# Patient Record
Sex: MALE | Race: WHITE | NOT HISPANIC OR LATINO | URBAN - METROPOLITAN AREA
[De-identification: names, ages, dates, MRNs, and addresses within clinical notes are randomized per-mention and may not be internally consistent; named-entity substitution may affect disease eponyms.]

---

## 2017-01-20 ENCOUNTER — OFFICE VISIT - HEALTHEAST (OUTPATIENT)
Dept: FAMILY MEDICINE | Facility: CLINIC | Age: 47
End: 2017-01-20

## 2017-01-20 DIAGNOSIS — H61.23 BILATERAL IMPACTED CERUMEN: ICD-10-CM

## 2017-01-20 DIAGNOSIS — F41.9 ANXIETY: ICD-10-CM

## 2017-01-20 ASSESSMENT — MIFFLIN-ST. JEOR: SCORE: 2061.91

## 2017-01-26 ENCOUNTER — COMMUNICATION - HEALTHEAST (OUTPATIENT)
Dept: FAMILY MEDICINE | Facility: CLINIC | Age: 47
End: 2017-01-26

## 2017-01-26 DIAGNOSIS — I10 UNSPECIFIED ESSENTIAL HYPERTENSION: ICD-10-CM

## 2017-02-16 ENCOUNTER — COMMUNICATION - HEALTHEAST (OUTPATIENT)
Dept: FAMILY MEDICINE | Facility: CLINIC | Age: 47
End: 2017-02-16

## 2017-02-16 DIAGNOSIS — F41.9 ANXIETY: ICD-10-CM

## 2017-02-20 ENCOUNTER — OFFICE VISIT - HEALTHEAST (OUTPATIENT)
Dept: FAMILY MEDICINE | Facility: CLINIC | Age: 47
End: 2017-02-20

## 2017-02-20 DIAGNOSIS — F41.9 ANXIETY: ICD-10-CM

## 2017-03-01 ENCOUNTER — OFFICE VISIT - HEALTHEAST (OUTPATIENT)
Dept: FAMILY MEDICINE | Facility: CLINIC | Age: 47
End: 2017-03-01

## 2017-03-01 DIAGNOSIS — L02.92 FURUNCLE: ICD-10-CM

## 2017-03-07 ENCOUNTER — COMMUNICATION - HEALTHEAST (OUTPATIENT)
Dept: PHYSICAL MEDICINE AND REHAB | Facility: CLINIC | Age: 47
End: 2017-03-07

## 2017-03-07 DIAGNOSIS — M54.16 LUMBAR RADICULOPATHY: ICD-10-CM

## 2017-03-21 ENCOUNTER — COMMUNICATION - HEALTHEAST (OUTPATIENT)
Dept: FAMILY MEDICINE | Facility: CLINIC | Age: 47
End: 2017-03-21

## 2017-03-21 DIAGNOSIS — F41.9 ANXIETY: ICD-10-CM

## 2017-05-26 ENCOUNTER — COMMUNICATION - HEALTHEAST (OUTPATIENT)
Dept: SCHEDULING | Facility: CLINIC | Age: 47
End: 2017-05-26

## 2017-05-26 ENCOUNTER — OFFICE VISIT - HEALTHEAST (OUTPATIENT)
Dept: FAMILY MEDICINE | Facility: CLINIC | Age: 47
End: 2017-05-26

## 2017-05-26 DIAGNOSIS — J32.9 SINUSITIS: ICD-10-CM

## 2017-05-26 DIAGNOSIS — K13.70 MOUTH LESION: ICD-10-CM

## 2017-06-12 ENCOUNTER — COMMUNICATION - HEALTHEAST (OUTPATIENT)
Dept: PHYSICAL MEDICINE AND REHAB | Facility: CLINIC | Age: 47
End: 2017-06-12

## 2017-06-12 DIAGNOSIS — M54.16 LUMBAR RADICULOPATHY: ICD-10-CM

## 2017-06-14 ENCOUNTER — HOSPITAL ENCOUNTER (OUTPATIENT)
Dept: PHYSICAL MEDICINE AND REHAB | Facility: CLINIC | Age: 47
Discharge: HOME | End: 2017-06-14
Attending: PHYSICIAN ASSISTANT

## 2017-06-14 DIAGNOSIS — M54.16 LUMBAR RADICULOPATHY: ICD-10-CM

## 2017-06-14 DIAGNOSIS — M54.12 CERVICAL RADICULAR PAIN: ICD-10-CM

## 2017-06-14 DIAGNOSIS — M47.812 SPONDYLOSIS OF CERVICAL REGION WITHOUT MYELOPATHY OR RADICULOPATHY: ICD-10-CM

## 2017-06-14 DIAGNOSIS — M54.50 LUMBALGIA: ICD-10-CM

## 2017-06-14 ASSESSMENT — MIFFLIN-ST. JEOR: SCORE: 2105.91

## 2017-06-15 ENCOUNTER — COMMUNICATION - HEALTHEAST (OUTPATIENT)
Dept: FAMILY MEDICINE | Facility: CLINIC | Age: 47
End: 2017-06-15

## 2017-06-16 ENCOUNTER — COMMUNICATION - HEALTHEAST (OUTPATIENT)
Dept: FAMILY MEDICINE | Facility: CLINIC | Age: 47
End: 2017-06-16

## 2017-06-16 DIAGNOSIS — I10 UNSPECIFIED ESSENTIAL HYPERTENSION: ICD-10-CM

## 2017-06-21 ENCOUNTER — COMMUNICATION - HEALTHEAST (OUTPATIENT)
Dept: FAMILY MEDICINE | Facility: CLINIC | Age: 47
End: 2017-06-21

## 2017-06-21 DIAGNOSIS — K21.9 ESOPHAGEAL REFLUX: ICD-10-CM

## 2017-07-10 ENCOUNTER — COMMUNICATION - HEALTHEAST (OUTPATIENT)
Dept: PHYSICAL MEDICINE AND REHAB | Facility: CLINIC | Age: 47
End: 2017-07-10

## 2017-07-10 DIAGNOSIS — M54.16 LUMBAR RADICULOPATHY: ICD-10-CM

## 2017-08-23 ENCOUNTER — COMMUNICATION - HEALTHEAST (OUTPATIENT)
Dept: FAMILY MEDICINE | Facility: CLINIC | Age: 47
End: 2017-08-23

## 2017-08-23 DIAGNOSIS — M10.9 GOUT: ICD-10-CM

## 2017-09-18 ENCOUNTER — COMMUNICATION - HEALTHEAST (OUTPATIENT)
Dept: FAMILY MEDICINE | Facility: CLINIC | Age: 47
End: 2017-09-18

## 2017-09-18 DIAGNOSIS — F41.9 ANXIETY: ICD-10-CM

## 2017-09-24 ENCOUNTER — COMMUNICATION - HEALTHEAST (OUTPATIENT)
Dept: FAMILY MEDICINE | Facility: CLINIC | Age: 47
End: 2017-09-24

## 2017-09-24 DIAGNOSIS — K21.9 ESOPHAGEAL REFLUX: ICD-10-CM

## 2017-09-25 ENCOUNTER — COMMUNICATION - HEALTHEAST (OUTPATIENT)
Dept: SCHEDULING | Facility: CLINIC | Age: 47
End: 2017-09-25

## 2017-10-27 ENCOUNTER — COMMUNICATION - HEALTHEAST (OUTPATIENT)
Dept: FAMILY MEDICINE | Facility: CLINIC | Age: 47
End: 2017-10-27

## 2017-10-27 DIAGNOSIS — I10 ESSENTIAL HYPERTENSION: ICD-10-CM

## 2017-12-27 ENCOUNTER — COMMUNICATION - HEALTHEAST (OUTPATIENT)
Dept: FAMILY MEDICINE | Facility: CLINIC | Age: 47
End: 2017-12-27

## 2017-12-27 DIAGNOSIS — M10.9 GOUT: ICD-10-CM

## 2017-12-27 DIAGNOSIS — K21.9 ESOPHAGEAL REFLUX: ICD-10-CM

## 2018-02-23 ENCOUNTER — OFFICE VISIT - HEALTHEAST (OUTPATIENT)
Dept: FAMILY MEDICINE | Facility: CLINIC | Age: 48
End: 2018-02-23

## 2018-02-23 ENCOUNTER — COMMUNICATION - HEALTHEAST (OUTPATIENT)
Dept: FAMILY MEDICINE | Facility: CLINIC | Age: 48
End: 2018-02-23

## 2018-02-23 DIAGNOSIS — M10.9 ACUTE GOUTY ARTHROPATHY: ICD-10-CM

## 2018-02-23 DIAGNOSIS — Z79.899 MEDICATION MANAGEMENT: ICD-10-CM

## 2018-02-23 DIAGNOSIS — Z00.00 HEALTH CARE MAINTENANCE: ICD-10-CM

## 2018-02-23 DIAGNOSIS — J01.00 ACUTE NON-RECURRENT MAXILLARY SINUSITIS: ICD-10-CM

## 2018-02-23 DIAGNOSIS — I10 ESSENTIAL HYPERTENSION: ICD-10-CM

## 2018-02-23 DIAGNOSIS — F41.1 ANXIETY STATE: ICD-10-CM

## 2018-02-23 LAB
ANION GAP SERPL CALCULATED.3IONS-SCNC: 8 MMOL/L (ref 5–18)
BUN SERPL-MCNC: 14 MG/DL (ref 8–22)
CALCIUM SERPL-MCNC: 9.5 MG/DL (ref 8.5–10.5)
CHLORIDE BLD-SCNC: 105 MMOL/L (ref 98–107)
CO2 SERPL-SCNC: 27 MMOL/L (ref 22–31)
CREAT SERPL-MCNC: 0.86 MG/DL (ref 0.7–1.3)
GFR SERPL CREATININE-BSD FRML MDRD: >60 ML/MIN/1.73M2
GLUCOSE BLD-MCNC: 72 MG/DL (ref 70–125)
POTASSIUM BLD-SCNC: 4.5 MMOL/L (ref 3.5–5)
SODIUM SERPL-SCNC: 140 MMOL/L (ref 136–145)
URATE SERPL-MCNC: 5.1 MG/DL (ref 3–8)

## 2018-02-23 ASSESSMENT — MIFFLIN-ST. JEOR: SCORE: 2057.37

## 2018-02-25 ENCOUNTER — COMMUNICATION - HEALTHEAST (OUTPATIENT)
Dept: FAMILY MEDICINE | Facility: CLINIC | Age: 48
End: 2018-02-25

## 2018-03-28 ENCOUNTER — COMMUNICATION - HEALTHEAST (OUTPATIENT)
Dept: FAMILY MEDICINE | Facility: CLINIC | Age: 48
End: 2018-03-28

## 2018-03-28 DIAGNOSIS — K21.9 ESOPHAGEAL REFLUX: ICD-10-CM

## 2018-03-28 DIAGNOSIS — I10 ESSENTIAL HYPERTENSION: ICD-10-CM

## 2018-04-05 ENCOUNTER — OFFICE VISIT - HEALTHEAST (OUTPATIENT)
Dept: FAMILY MEDICINE | Facility: CLINIC | Age: 48
End: 2018-04-05

## 2018-04-05 DIAGNOSIS — E78.2 MIXED HYPERLIPIDEMIA: ICD-10-CM

## 2018-04-05 DIAGNOSIS — M79.604 PAIN IN BOTH LOWER EXTREMITIES: ICD-10-CM

## 2018-04-05 DIAGNOSIS — I10 ESSENTIAL HYPERTENSION: ICD-10-CM

## 2018-04-05 DIAGNOSIS — M79.605 PAIN IN BOTH LOWER EXTREMITIES: ICD-10-CM

## 2018-04-05 LAB
ANION GAP SERPL CALCULATED.3IONS-SCNC: 11 MMOL/L (ref 5–18)
BUN SERPL-MCNC: 12 MG/DL (ref 8–22)
CALCIUM SERPL-MCNC: 9.1 MG/DL (ref 8.5–10.5)
CHLORIDE BLD-SCNC: 104 MMOL/L (ref 98–107)
CO2 SERPL-SCNC: 25 MMOL/L (ref 22–31)
CREAT SERPL-MCNC: 0.85 MG/DL (ref 0.7–1.3)
ERYTHROCYTE [SEDIMENTATION RATE] IN BLOOD BY WESTERGREN METHOD: 2 MM/HR (ref 0–15)
GFR SERPL CREATININE-BSD FRML MDRD: >60 ML/MIN/1.73M2
GLUCOSE BLD-MCNC: 89 MG/DL (ref 70–125)
MAGNESIUM SERPL-MCNC: 2.2 MG/DL (ref 1.8–2.6)
POTASSIUM BLD-SCNC: 4.7 MMOL/L (ref 3.5–5)
RHEUMATOID FACT SERPL-ACNC: <15 IU/ML (ref 0–30)
SODIUM SERPL-SCNC: 140 MMOL/L (ref 136–145)

## 2018-04-05 ASSESSMENT — MIFFLIN-ST. JEOR: SCORE: 2078.69

## 2018-04-06 ENCOUNTER — COMMUNICATION - HEALTHEAST (OUTPATIENT)
Dept: FAMILY MEDICINE | Facility: CLINIC | Age: 48
End: 2018-04-06

## 2018-04-06 DIAGNOSIS — I10 ESSENTIAL HYPERTENSION: ICD-10-CM

## 2018-04-09 LAB — ANA SER QL: 0.2 U

## 2018-05-02 ENCOUNTER — OFFICE VISIT - HEALTHEAST (OUTPATIENT)
Dept: FAMILY MEDICINE | Facility: CLINIC | Age: 48
End: 2018-05-02

## 2018-05-02 DIAGNOSIS — M79.604 PAIN IN BOTH LOWER EXTREMITIES: ICD-10-CM

## 2018-05-02 DIAGNOSIS — M54.16 LUMBAR RADICULOPATHY: ICD-10-CM

## 2018-05-02 DIAGNOSIS — M79.605 PAIN IN BOTH LOWER EXTREMITIES: ICD-10-CM

## 2018-05-02 ASSESSMENT — MIFFLIN-ST. JEOR: SCORE: 2083.23

## 2018-05-03 ENCOUNTER — AMBULATORY - HEALTHEAST (OUTPATIENT)
Dept: LAB | Facility: CLINIC | Age: 48
End: 2018-05-03

## 2018-05-03 DIAGNOSIS — M79.605 PAIN IN BOTH LOWER EXTREMITIES: ICD-10-CM

## 2018-05-03 DIAGNOSIS — M79.604 PAIN IN BOTH LOWER EXTREMITIES: ICD-10-CM

## 2018-05-09 ENCOUNTER — OFFICE VISIT - HEALTHEAST (OUTPATIENT)
Dept: VASCULAR SURGERY | Facility: CLINIC | Age: 48
End: 2018-05-09

## 2018-05-09 DIAGNOSIS — M79.606 LEG PAIN: ICD-10-CM

## 2018-05-09 DIAGNOSIS — G25.81 RESTLESS LEGS SYNDROME: ICD-10-CM

## 2018-05-10 ENCOUNTER — COMMUNICATION - HEALTHEAST (OUTPATIENT)
Dept: FAMILY MEDICINE | Facility: CLINIC | Age: 48
End: 2018-05-10

## 2018-05-10 DIAGNOSIS — I10 ESSENTIAL HYPERTENSION: ICD-10-CM

## 2018-05-11 ENCOUNTER — HOSPITAL ENCOUNTER (OUTPATIENT)
Dept: PHYSICAL MEDICINE AND REHAB | Facility: CLINIC | Age: 48
Discharge: HOME | End: 2018-05-11
Attending: PHYSICIAN ASSISTANT

## 2018-05-11 DIAGNOSIS — M54.12 CERVICAL RADICULITIS: ICD-10-CM

## 2018-05-11 DIAGNOSIS — M54.50 LUMBALGIA: ICD-10-CM

## 2018-05-11 DIAGNOSIS — M54.16 LUMBAR RADICULITIS: ICD-10-CM

## 2018-05-11 DIAGNOSIS — M54.2 CERVICALGIA: ICD-10-CM

## 2018-05-11 DIAGNOSIS — M79.18 MYOFASCIAL PAIN: ICD-10-CM

## 2018-05-17 ENCOUNTER — HOSPITAL ENCOUNTER (OUTPATIENT)
Dept: MRI IMAGING | Facility: CLINIC | Age: 48
Discharge: HOME | End: 2018-05-17
Attending: PHYSICIAN ASSISTANT

## 2018-05-17 DIAGNOSIS — M54.16 LUMBAR RADICULITIS: ICD-10-CM

## 2018-05-17 DIAGNOSIS — M54.50 LUMBALGIA: ICD-10-CM

## 2018-05-17 DIAGNOSIS — M54.2 CERVICALGIA: ICD-10-CM

## 2018-05-17 DIAGNOSIS — M79.18 MYOFASCIAL PAIN: ICD-10-CM

## 2018-05-17 DIAGNOSIS — M54.12 CERVICAL RADICULITIS: ICD-10-CM

## 2018-05-25 ENCOUNTER — COMMUNICATION - HEALTHEAST (OUTPATIENT)
Dept: SCHEDULING | Facility: CLINIC | Age: 48
End: 2018-05-25

## 2018-05-25 ENCOUNTER — HOSPITAL ENCOUNTER (OUTPATIENT)
Dept: PHYSICAL MEDICINE AND REHAB | Facility: CLINIC | Age: 48
Discharge: HOME | End: 2018-05-25
Attending: PHYSICIAN ASSISTANT

## 2018-05-25 DIAGNOSIS — M79.18 MYOFASCIAL PAIN: ICD-10-CM

## 2018-05-25 DIAGNOSIS — M50.20 CERVICAL DISC HERNIATION: ICD-10-CM

## 2018-05-25 DIAGNOSIS — M51.26 LUMBAR DISC HERNIATION: ICD-10-CM

## 2018-05-25 DIAGNOSIS — M54.50 LUMBALGIA: ICD-10-CM

## 2018-05-25 DIAGNOSIS — M54.16 LUMBAR RADICULITIS: ICD-10-CM

## 2018-05-25 DIAGNOSIS — M54.2 CERVICALGIA: ICD-10-CM

## 2018-05-25 DIAGNOSIS — M54.12 CERVICAL RADICULITIS: ICD-10-CM

## 2018-05-25 DIAGNOSIS — M54.16 LUMBAR RADICULOPATHY: ICD-10-CM

## 2018-05-25 ASSESSMENT — MIFFLIN-ST. JEOR: SCORE: 2071.89

## 2018-06-01 ENCOUNTER — COMMUNICATION - HEALTHEAST (OUTPATIENT)
Dept: FAMILY MEDICINE | Facility: CLINIC | Age: 48
End: 2018-06-01

## 2018-06-21 ENCOUNTER — COMMUNICATION - HEALTHEAST (OUTPATIENT)
Dept: PHYSICAL MEDICINE AND REHAB | Facility: CLINIC | Age: 48
End: 2018-06-21

## 2018-06-21 DIAGNOSIS — M47.812 SPONDYLOSIS OF CERVICAL REGION WITHOUT MYELOPATHY OR RADICULOPATHY: ICD-10-CM

## 2018-06-21 DIAGNOSIS — M54.50 LUMBALGIA: ICD-10-CM

## 2018-06-21 DIAGNOSIS — M54.12 CERVICAL RADICULAR PAIN: ICD-10-CM

## 2018-06-21 DIAGNOSIS — M54.16 LUMBAR RADICULOPATHY: ICD-10-CM

## 2018-08-30 ENCOUNTER — COMMUNICATION - HEALTHEAST (OUTPATIENT)
Dept: PHYSICAL MEDICINE AND REHAB | Facility: CLINIC | Age: 48
End: 2018-08-30

## 2018-08-30 DIAGNOSIS — M54.12 CERVICAL RADICULAR PAIN: ICD-10-CM

## 2018-08-30 DIAGNOSIS — M54.16 LUMBAR RADICULOPATHY: ICD-10-CM

## 2018-08-30 DIAGNOSIS — M54.50 LUMBALGIA: ICD-10-CM

## 2018-08-30 DIAGNOSIS — M47.812 SPONDYLOSIS OF CERVICAL REGION WITHOUT MYELOPATHY OR RADICULOPATHY: ICD-10-CM

## 2018-10-30 ENCOUNTER — OFFICE VISIT - HEALTHEAST (OUTPATIENT)
Dept: FAMILY MEDICINE | Facility: CLINIC | Age: 48
End: 2018-10-30

## 2018-10-30 ENCOUNTER — COMMUNICATION - HEALTHEAST (OUTPATIENT)
Dept: FAMILY MEDICINE | Facility: CLINIC | Age: 48
End: 2018-10-30

## 2018-10-30 ENCOUNTER — COMMUNICATION - HEALTHEAST (OUTPATIENT)
Dept: PHYSICAL MEDICINE AND REHAB | Facility: CLINIC | Age: 48
End: 2018-10-30

## 2018-10-30 DIAGNOSIS — M54.16 LUMBAR RADICULOPATHY: ICD-10-CM

## 2018-10-30 DIAGNOSIS — M10.9 GOUTY ARTHROPATHY: ICD-10-CM

## 2018-10-30 DIAGNOSIS — M79.605 PAIN IN BOTH LOWER EXTREMITIES: ICD-10-CM

## 2018-10-30 DIAGNOSIS — Z00.00 ROUTINE GENERAL MEDICAL EXAMINATION AT A HEALTH CARE FACILITY: ICD-10-CM

## 2018-10-30 DIAGNOSIS — I10 ESSENTIAL HYPERTENSION: ICD-10-CM

## 2018-10-30 DIAGNOSIS — E78.2 MIXED HYPERLIPIDEMIA: ICD-10-CM

## 2018-10-30 DIAGNOSIS — Z00.00 HEALTH CARE MAINTENANCE: ICD-10-CM

## 2018-10-30 DIAGNOSIS — F41.9 ANXIETY: ICD-10-CM

## 2018-10-30 DIAGNOSIS — E66.01 MORBID OBESITY (H): ICD-10-CM

## 2018-10-30 DIAGNOSIS — M47.812 SPONDYLOSIS OF CERVICAL REGION WITHOUT MYELOPATHY OR RADICULOPATHY: ICD-10-CM

## 2018-10-30 DIAGNOSIS — K50.90 REGIONAL ENTERITIS (H): ICD-10-CM

## 2018-10-30 DIAGNOSIS — M54.12 CERVICAL RADICULAR PAIN: ICD-10-CM

## 2018-10-30 DIAGNOSIS — M79.604 PAIN IN BOTH LOWER EXTREMITIES: ICD-10-CM

## 2018-10-30 DIAGNOSIS — M54.50 LUMBALGIA: ICD-10-CM

## 2018-10-30 LAB
ANION GAP SERPL CALCULATED.3IONS-SCNC: 13 MMOL/L (ref 5–18)
BUN SERPL-MCNC: 10 MG/DL (ref 8–22)
CALCIUM SERPL-MCNC: 9.7 MG/DL (ref 8.5–10.5)
CHLORIDE BLD-SCNC: 104 MMOL/L (ref 98–107)
CO2 SERPL-SCNC: 25 MMOL/L (ref 22–31)
CREAT SERPL-MCNC: 0.88 MG/DL (ref 0.7–1.3)
GFR SERPL CREATININE-BSD FRML MDRD: >60 ML/MIN/1.73M2
GLUCOSE BLD-MCNC: 94 MG/DL (ref 70–125)
POTASSIUM BLD-SCNC: 4.2 MMOL/L (ref 3.5–5)
SODIUM SERPL-SCNC: 142 MMOL/L (ref 136–145)

## 2018-10-30 ASSESSMENT — MIFFLIN-ST. JEOR: SCORE: 2035.83

## 2018-11-13 ENCOUNTER — COMMUNICATION - HEALTHEAST (OUTPATIENT)
Dept: FAMILY MEDICINE | Facility: CLINIC | Age: 48
End: 2018-11-13

## 2018-12-19 ENCOUNTER — COMMUNICATION - HEALTHEAST (OUTPATIENT)
Dept: FAMILY MEDICINE | Facility: CLINIC | Age: 48
End: 2018-12-19

## 2018-12-20 ENCOUNTER — AMBULATORY - HEALTHEAST (OUTPATIENT)
Dept: FAMILY MEDICINE | Facility: CLINIC | Age: 48
End: 2018-12-20

## 2018-12-20 DIAGNOSIS — R45.4 IRRITABILITY: ICD-10-CM

## 2019-01-07 ENCOUNTER — COMMUNICATION - HEALTHEAST (OUTPATIENT)
Dept: FAMILY MEDICINE | Facility: CLINIC | Age: 49
End: 2019-01-07

## 2019-01-07 ENCOUNTER — AMBULATORY - HEALTHEAST (OUTPATIENT)
Dept: FAMILY MEDICINE | Facility: CLINIC | Age: 49
End: 2019-01-07

## 2019-01-07 DIAGNOSIS — R41.840 DIFFICULTY CONCENTRATING: ICD-10-CM

## 2019-01-10 ENCOUNTER — COMMUNICATION - HEALTHEAST (OUTPATIENT)
Dept: FAMILY MEDICINE | Facility: CLINIC | Age: 49
End: 2019-01-10

## 2019-01-10 DIAGNOSIS — K21.9 ESOPHAGEAL REFLUX: ICD-10-CM

## 2019-01-28 ENCOUNTER — RECORDS - HEALTHEAST (OUTPATIENT)
Dept: ADMINISTRATIVE | Facility: OTHER | Age: 49
End: 2019-01-28

## 2019-01-29 ENCOUNTER — COMMUNICATION - HEALTHEAST (OUTPATIENT)
Dept: FAMILY MEDICINE | Facility: CLINIC | Age: 49
End: 2019-01-29

## 2019-02-04 ENCOUNTER — OFFICE VISIT - HEALTHEAST (OUTPATIENT)
Dept: FAMILY MEDICINE | Facility: CLINIC | Age: 49
End: 2019-02-04

## 2019-02-04 DIAGNOSIS — I10 ESSENTIAL HYPERTENSION: ICD-10-CM

## 2019-02-04 DIAGNOSIS — F41.9 ANXIETY: ICD-10-CM

## 2019-02-04 DIAGNOSIS — F90.2 ATTENTION DEFICIT HYPERACTIVITY DISORDER (ADHD), COMBINED TYPE: ICD-10-CM

## 2019-02-04 ASSESSMENT — MIFFLIN-ST. JEOR: SCORE: 2061

## 2019-02-05 ENCOUNTER — COMMUNICATION - HEALTHEAST (OUTPATIENT)
Dept: FAMILY MEDICINE | Facility: CLINIC | Age: 49
End: 2019-02-05

## 2019-02-28 ENCOUNTER — COMMUNICATION - HEALTHEAST (OUTPATIENT)
Dept: FAMILY MEDICINE | Facility: CLINIC | Age: 49
End: 2019-02-28

## 2019-02-28 DIAGNOSIS — F41.9 ANXIETY: ICD-10-CM

## 2019-03-04 ENCOUNTER — OFFICE VISIT - HEALTHEAST (OUTPATIENT)
Dept: FAMILY MEDICINE | Facility: CLINIC | Age: 49
End: 2019-03-04

## 2019-03-04 DIAGNOSIS — F41.9 ANXIETY: ICD-10-CM

## 2019-03-04 DIAGNOSIS — I10 ESSENTIAL HYPERTENSION: ICD-10-CM

## 2019-03-04 DIAGNOSIS — F90.2 ATTENTION DEFICIT HYPERACTIVITY DISORDER (ADHD), COMBINED TYPE: ICD-10-CM

## 2019-03-04 DIAGNOSIS — E66.01 MORBID OBESITY (H): ICD-10-CM

## 2019-03-04 ASSESSMENT — MIFFLIN-ST. JEOR: SCORE: 2031.06

## 2019-03-06 ENCOUNTER — COMMUNICATION - HEALTHEAST (OUTPATIENT)
Dept: FAMILY MEDICINE | Facility: CLINIC | Age: 49
End: 2019-03-06

## 2019-03-13 ENCOUNTER — COMMUNICATION - HEALTHEAST (OUTPATIENT)
Dept: FAMILY MEDICINE | Facility: CLINIC | Age: 49
End: 2019-03-13

## 2019-03-18 ENCOUNTER — COMMUNICATION - HEALTHEAST (OUTPATIENT)
Dept: FAMILY MEDICINE | Facility: CLINIC | Age: 49
End: 2019-03-18

## 2019-03-18 DIAGNOSIS — M10.9 ACUTE GOUTY ARTHROPATHY: ICD-10-CM

## 2019-03-28 ENCOUNTER — COMMUNICATION - HEALTHEAST (OUTPATIENT)
Dept: FAMILY MEDICINE | Facility: CLINIC | Age: 49
End: 2019-03-28

## 2019-03-28 ENCOUNTER — AMBULATORY - HEALTHEAST (OUTPATIENT)
Dept: FAMILY MEDICINE | Facility: CLINIC | Age: 49
End: 2019-03-28

## 2019-03-28 DIAGNOSIS — F90.2 ATTENTION DEFICIT HYPERACTIVITY DISORDER (ADHD), COMBINED TYPE: ICD-10-CM

## 2019-04-09 ENCOUNTER — COMMUNICATION - HEALTHEAST (OUTPATIENT)
Dept: FAMILY MEDICINE | Facility: CLINIC | Age: 49
End: 2019-04-09

## 2019-04-11 ENCOUNTER — COMMUNICATION - HEALTHEAST (OUTPATIENT)
Dept: FAMILY MEDICINE | Facility: CLINIC | Age: 49
End: 2019-04-11

## 2019-04-11 ENCOUNTER — RECORDS - HEALTHEAST (OUTPATIENT)
Dept: ADMINISTRATIVE | Facility: OTHER | Age: 49
End: 2019-04-11

## 2019-04-11 ENCOUNTER — AMBULATORY - HEALTHEAST (OUTPATIENT)
Dept: LAB | Facility: CLINIC | Age: 49
End: 2019-04-11

## 2019-04-11 DIAGNOSIS — R45.4 IRRITABILITY: ICD-10-CM

## 2019-04-11 LAB — TSH SERPL DL<=0.005 MIU/L-ACNC: 0.69 UIU/ML (ref 0.3–5)

## 2019-04-16 ENCOUNTER — COMMUNICATION - HEALTHEAST (OUTPATIENT)
Dept: FAMILY MEDICINE | Facility: CLINIC | Age: 49
End: 2019-04-16

## 2019-04-16 LAB
SHBG SERPL-SCNC: 37 NMOL/L (ref 11–80)
TESTOST FREE SERPL-MCNC: 10.86 NG/DL (ref 4.7–24.4)
TESTOST SERPL-MCNC: 547 NG/DL (ref 240–950)

## 2019-04-26 ENCOUNTER — COMMUNICATION - HEALTHEAST (OUTPATIENT)
Dept: FAMILY MEDICINE | Facility: CLINIC | Age: 49
End: 2019-04-26

## 2019-04-26 DIAGNOSIS — F90.2 ATTENTION DEFICIT HYPERACTIVITY DISORDER (ADHD), COMBINED TYPE: ICD-10-CM

## 2019-04-29 ENCOUNTER — RECORDS - HEALTHEAST (OUTPATIENT)
Dept: ADMINISTRATIVE | Facility: OTHER | Age: 49
End: 2019-04-29

## 2019-05-01 ENCOUNTER — OFFICE VISIT - HEALTHEAST (OUTPATIENT)
Dept: FAMILY MEDICINE | Facility: CLINIC | Age: 49
End: 2019-05-01

## 2019-05-01 ENCOUNTER — COMMUNICATION - HEALTHEAST (OUTPATIENT)
Dept: FAMILY MEDICINE | Facility: CLINIC | Age: 49
End: 2019-05-01

## 2019-05-01 DIAGNOSIS — F41.9 ANXIETY: ICD-10-CM

## 2019-05-01 DIAGNOSIS — R60.9 EDEMA, UNSPECIFIED TYPE: ICD-10-CM

## 2019-05-01 DIAGNOSIS — R25.2 LEG CRAMPS: ICD-10-CM

## 2019-05-01 DIAGNOSIS — F90.2 ATTENTION DEFICIT HYPERACTIVITY DISORDER (ADHD), COMBINED TYPE: ICD-10-CM

## 2019-05-01 DIAGNOSIS — H57.04 EPISODIC MYDRIASIS OF RIGHT EYE: ICD-10-CM

## 2019-05-01 LAB
ALBUMIN SERPL-MCNC: 4.4 G/DL (ref 3.5–5)
ALP SERPL-CCNC: 67 U/L (ref 45–120)
ALT SERPL W P-5'-P-CCNC: 32 U/L (ref 0–45)
ANION GAP SERPL CALCULATED.3IONS-SCNC: 9 MMOL/L (ref 5–18)
AST SERPL W P-5'-P-CCNC: 28 U/L (ref 0–40)
BILIRUB SERPL-MCNC: 0.5 MG/DL (ref 0–1)
BUN SERPL-MCNC: 15 MG/DL (ref 8–22)
CALCIUM SERPL-MCNC: 9.9 MG/DL (ref 8.5–10.5)
CHLORIDE BLD-SCNC: 102 MMOL/L (ref 98–107)
CO2 SERPL-SCNC: 30 MMOL/L (ref 22–31)
CREAT SERPL-MCNC: 1.04 MG/DL (ref 0.7–1.3)
GFR SERPL CREATININE-BSD FRML MDRD: >60 ML/MIN/1.73M2
GLUCOSE BLD-MCNC: 94 MG/DL (ref 70–125)
MAGNESIUM SERPL-MCNC: 2.2 MG/DL (ref 1.8–2.6)
POTASSIUM BLD-SCNC: 4.4 MMOL/L (ref 3.5–5)
PROT SERPL-MCNC: 7.2 G/DL (ref 6–8)
SODIUM SERPL-SCNC: 141 MMOL/L (ref 136–145)

## 2019-05-01 ASSESSMENT — MIFFLIN-ST. JEOR: SCORE: 2021.99

## 2019-05-28 ENCOUNTER — COMMUNICATION - HEALTHEAST (OUTPATIENT)
Dept: FAMILY MEDICINE | Facility: CLINIC | Age: 49
End: 2019-05-28

## 2019-05-28 DIAGNOSIS — F90.2 ATTENTION DEFICIT HYPERACTIVITY DISORDER (ADHD), COMBINED TYPE: ICD-10-CM

## 2019-06-10 ENCOUNTER — COMMUNICATION - HEALTHEAST (OUTPATIENT)
Dept: FAMILY MEDICINE | Facility: CLINIC | Age: 49
End: 2019-06-10

## 2019-06-10 DIAGNOSIS — I10 ESSENTIAL HYPERTENSION: ICD-10-CM

## 2019-06-26 ENCOUNTER — COMMUNICATION - HEALTHEAST (OUTPATIENT)
Dept: FAMILY MEDICINE | Facility: CLINIC | Age: 49
End: 2019-06-26

## 2019-06-26 DIAGNOSIS — F90.2 ATTENTION DEFICIT HYPERACTIVITY DISORDER (ADHD), COMBINED TYPE: ICD-10-CM

## 2019-06-27 ENCOUNTER — AMBULATORY - HEALTHEAST (OUTPATIENT)
Dept: FAMILY MEDICINE | Facility: CLINIC | Age: 49
End: 2019-06-27

## 2019-07-01 ENCOUNTER — OFFICE VISIT - HEALTHEAST (OUTPATIENT)
Dept: FAMILY MEDICINE | Facility: CLINIC | Age: 49
End: 2019-07-01

## 2019-07-01 ENCOUNTER — COMMUNICATION - HEALTHEAST (OUTPATIENT)
Dept: SCHEDULING | Facility: CLINIC | Age: 49
End: 2019-07-01

## 2019-07-01 DIAGNOSIS — H61.22 IMPACTED CERUMEN OF LEFT EAR: ICD-10-CM

## 2019-07-01 ASSESSMENT — MIFFLIN-ST. JEOR: SCORE: 1976.63

## 2019-07-06 ENCOUNTER — COMMUNICATION - HEALTHEAST (OUTPATIENT)
Dept: PHYSICAL MEDICINE AND REHAB | Facility: CLINIC | Age: 49
End: 2019-07-06

## 2019-07-06 DIAGNOSIS — M47.812 SPONDYLOSIS OF CERVICAL REGION WITHOUT MYELOPATHY OR RADICULOPATHY: ICD-10-CM

## 2019-07-06 DIAGNOSIS — M54.16 LUMBAR RADICULOPATHY: ICD-10-CM

## 2019-07-06 DIAGNOSIS — M54.50 LUMBALGIA: ICD-10-CM

## 2019-07-06 DIAGNOSIS — M54.12 CERVICAL RADICULAR PAIN: ICD-10-CM

## 2019-07-24 ENCOUNTER — HOSPITAL ENCOUNTER (OUTPATIENT)
Dept: PHYSICAL MEDICINE AND REHAB | Facility: CLINIC | Age: 49
Discharge: HOME | End: 2019-07-24
Attending: NURSE PRACTITIONER

## 2019-07-24 ENCOUNTER — COMMUNICATION - HEALTHEAST (OUTPATIENT)
Dept: FAMILY MEDICINE | Facility: CLINIC | Age: 49
End: 2019-07-24

## 2019-07-24 DIAGNOSIS — M54.16 LEFT LUMBAR RADICULITIS: ICD-10-CM

## 2019-07-24 DIAGNOSIS — M54.42 CHRONIC BILATERAL LOW BACK PAIN WITH BILATERAL SCIATICA: ICD-10-CM

## 2019-07-24 DIAGNOSIS — M47.812 SPONDYLOSIS OF CERVICAL REGION WITHOUT MYELOPATHY OR RADICULOPATHY: ICD-10-CM

## 2019-07-24 DIAGNOSIS — M54.50 LUMBALGIA: ICD-10-CM

## 2019-07-24 DIAGNOSIS — M54.16 LUMBAR RADICULOPATHY: ICD-10-CM

## 2019-07-24 DIAGNOSIS — M54.12 CERVICAL RADICULAR PAIN: ICD-10-CM

## 2019-07-24 DIAGNOSIS — F90.2 ATTENTION DEFICIT HYPERACTIVITY DISORDER (ADHD), COMBINED TYPE: ICD-10-CM

## 2019-07-24 DIAGNOSIS — G89.29 CHRONIC BILATERAL LOW BACK PAIN WITH BILATERAL SCIATICA: ICD-10-CM

## 2019-07-24 DIAGNOSIS — M48.061 FORAMINAL STENOSIS OF LUMBAR REGION: ICD-10-CM

## 2019-07-24 DIAGNOSIS — M54.41 CHRONIC BILATERAL LOW BACK PAIN WITH BILATERAL SCIATICA: ICD-10-CM

## 2019-07-25 ENCOUNTER — COMMUNICATION - HEALTHEAST (OUTPATIENT)
Dept: FAMILY MEDICINE | Facility: CLINIC | Age: 49
End: 2019-07-25

## 2019-07-25 DIAGNOSIS — F90.2 ATTENTION DEFICIT HYPERACTIVITY DISORDER (ADHD), COMBINED TYPE: ICD-10-CM

## 2019-08-07 ENCOUNTER — COMMUNICATION - HEALTHEAST (OUTPATIENT)
Dept: PHYSICAL MEDICINE AND REHAB | Facility: CLINIC | Age: 49
End: 2019-08-07

## 2019-08-09 ENCOUNTER — HOSPITAL ENCOUNTER (OUTPATIENT)
Dept: PHYSICAL MEDICINE AND REHAB | Facility: CLINIC | Age: 49
Discharge: HOME | End: 2019-08-09
Attending: PAIN MEDICINE

## 2019-08-09 DIAGNOSIS — M48.061 FORAMINAL STENOSIS OF LUMBAR REGION: ICD-10-CM

## 2019-08-09 DIAGNOSIS — M99.83 OTHER BIOMECHANICAL LESIONS OF LUMBAR REGION: ICD-10-CM

## 2019-08-09 DIAGNOSIS — M54.16 LEFT LUMBAR RADICULITIS: ICD-10-CM

## 2019-08-09 DIAGNOSIS — M54.41 CHRONIC BILATERAL LOW BACK PAIN WITH BILATERAL SCIATICA: ICD-10-CM

## 2019-08-09 DIAGNOSIS — M54.42 CHRONIC BILATERAL LOW BACK PAIN WITH BILATERAL SCIATICA: ICD-10-CM

## 2019-08-09 DIAGNOSIS — G89.29 CHRONIC BILATERAL LOW BACK PAIN WITH BILATERAL SCIATICA: ICD-10-CM

## 2019-08-22 ENCOUNTER — HOSPITAL ENCOUNTER (OUTPATIENT)
Dept: PHYSICAL MEDICINE AND REHAB | Facility: CLINIC | Age: 49
Discharge: HOME | End: 2019-08-22
Attending: NURSE PRACTITIONER

## 2019-08-22 DIAGNOSIS — M54.16 LUMBAR RADICULITIS: ICD-10-CM

## 2019-08-22 DIAGNOSIS — M48.061 FORAMINAL STENOSIS OF LUMBAR REGION: ICD-10-CM

## 2019-08-22 DIAGNOSIS — M54.16 LUMBAR RADICULOPATHY: ICD-10-CM

## 2019-08-22 DIAGNOSIS — M54.42 CHRONIC BILATERAL LOW BACK PAIN WITH BILATERAL SCIATICA: ICD-10-CM

## 2019-08-22 DIAGNOSIS — G89.29 CHRONIC BILATERAL LOW BACK PAIN WITH BILATERAL SCIATICA: ICD-10-CM

## 2019-08-22 DIAGNOSIS — M54.41 CHRONIC BILATERAL LOW BACK PAIN WITH BILATERAL SCIATICA: ICD-10-CM

## 2019-08-23 ENCOUNTER — HOSPITAL ENCOUNTER (OUTPATIENT)
Dept: PHYSICAL MEDICINE AND REHAB | Facility: CLINIC | Age: 49
Discharge: HOME | End: 2019-08-23
Attending: PAIN MEDICINE

## 2019-08-23 DIAGNOSIS — M54.42 CHRONIC BILATERAL LOW BACK PAIN WITH BILATERAL SCIATICA: ICD-10-CM

## 2019-08-23 DIAGNOSIS — G89.29 CHRONIC BILATERAL LOW BACK PAIN WITH BILATERAL SCIATICA: ICD-10-CM

## 2019-08-23 DIAGNOSIS — M48.061 FORAMINAL STENOSIS OF LUMBAR REGION: ICD-10-CM

## 2019-08-23 DIAGNOSIS — M54.16 LUMBAR RADICULITIS: ICD-10-CM

## 2019-08-23 DIAGNOSIS — M99.83 OTHER BIOMECHANICAL LESIONS OF LUMBAR REGION: ICD-10-CM

## 2019-08-23 DIAGNOSIS — M54.41 CHRONIC BILATERAL LOW BACK PAIN WITH BILATERAL SCIATICA: ICD-10-CM

## 2019-08-23 DIAGNOSIS — M54.16 LUMBAR RADICULOPATHY: ICD-10-CM

## 2019-08-27 ENCOUNTER — COMMUNICATION - HEALTHEAST (OUTPATIENT)
Dept: FAMILY MEDICINE | Facility: CLINIC | Age: 49
End: 2019-08-27

## 2019-08-27 ENCOUNTER — AMBULATORY - HEALTHEAST (OUTPATIENT)
Dept: FAMILY MEDICINE | Facility: CLINIC | Age: 49
End: 2019-08-27

## 2019-08-27 DIAGNOSIS — F41.9 ANXIETY: ICD-10-CM

## 2019-08-27 DIAGNOSIS — F90.2 ATTENTION DEFICIT HYPERACTIVITY DISORDER (ADHD), COMBINED TYPE: ICD-10-CM

## 2019-08-30 ENCOUNTER — AMBULATORY - HEALTHEAST (OUTPATIENT)
Dept: PHYSICAL MEDICINE AND REHAB | Facility: CLINIC | Age: 49
End: 2019-08-30

## 2019-08-30 ENCOUNTER — HOSPITAL ENCOUNTER (OUTPATIENT)
Dept: PHYSICAL MEDICINE AND REHAB | Facility: CLINIC | Age: 49
Discharge: HOME | End: 2019-08-30
Attending: NURSE PRACTITIONER

## 2019-08-30 ENCOUNTER — COMMUNICATION - HEALTHEAST (OUTPATIENT)
Dept: PHYSICAL MEDICINE AND REHAB | Facility: CLINIC | Age: 49
End: 2019-08-30

## 2019-08-30 DIAGNOSIS — M54.42 CHRONIC BILATERAL LOW BACK PAIN WITH BILATERAL SCIATICA: ICD-10-CM

## 2019-08-30 DIAGNOSIS — G89.29 CHRONIC BILATERAL LOW BACK PAIN WITH BILATERAL SCIATICA: ICD-10-CM

## 2019-08-30 DIAGNOSIS — M54.2 CERVICALGIA: ICD-10-CM

## 2019-08-30 DIAGNOSIS — M54.16 LEFT LUMBAR RADICULITIS: ICD-10-CM

## 2019-08-30 DIAGNOSIS — M54.41 CHRONIC BILATERAL LOW BACK PAIN WITH BILATERAL SCIATICA: ICD-10-CM

## 2019-08-30 DIAGNOSIS — M62.838 MUSCLE SPASM: ICD-10-CM

## 2019-08-30 DIAGNOSIS — M48.061 FORAMINAL STENOSIS OF LUMBAR REGION: ICD-10-CM

## 2019-09-05 ENCOUNTER — OFFICE VISIT - HEALTHEAST (OUTPATIENT)
Dept: FAMILY MEDICINE | Facility: CLINIC | Age: 49
End: 2019-09-05

## 2019-09-05 ENCOUNTER — COMMUNICATION - HEALTHEAST (OUTPATIENT)
Dept: FAMILY MEDICINE | Facility: CLINIC | Age: 49
End: 2019-09-05

## 2019-09-05 ENCOUNTER — COMMUNICATION - HEALTHEAST (OUTPATIENT)
Dept: SCHEDULING | Facility: CLINIC | Age: 49
End: 2019-09-05

## 2019-09-05 DIAGNOSIS — M54.16 LEFT LUMBAR RADICULITIS: ICD-10-CM

## 2019-09-05 DIAGNOSIS — F98.8 ATTENTION DEFICIT DISORDER (ADD) WITHOUT HYPERACTIVITY: ICD-10-CM

## 2019-09-05 DIAGNOSIS — M54.42 CHRONIC BILATERAL LOW BACK PAIN WITH BILATERAL SCIATICA: ICD-10-CM

## 2019-09-05 DIAGNOSIS — M54.41 CHRONIC BILATERAL LOW BACK PAIN WITH BILATERAL SCIATICA: ICD-10-CM

## 2019-09-05 DIAGNOSIS — I10 ESSENTIAL HYPERTENSION: ICD-10-CM

## 2019-09-05 DIAGNOSIS — E66.811 CLASS 1 OBESITY DUE TO EXCESS CALORIES WITH SERIOUS COMORBIDITY AND BODY MASS INDEX (BMI) OF 32.0 TO 32.9 IN ADULT: ICD-10-CM

## 2019-09-05 DIAGNOSIS — R45.4 IRRITABILITY: ICD-10-CM

## 2019-09-05 DIAGNOSIS — G89.29 CHRONIC BILATERAL LOW BACK PAIN WITH BILATERAL SCIATICA: ICD-10-CM

## 2019-09-05 DIAGNOSIS — E66.09 CLASS 1 OBESITY DUE TO EXCESS CALORIES WITH SERIOUS COMORBIDITY AND BODY MASS INDEX (BMI) OF 32.0 TO 32.9 IN ADULT: ICD-10-CM

## 2019-09-05 ASSESSMENT — PATIENT HEALTH QUESTIONNAIRE - PHQ9: SUM OF ALL RESPONSES TO PHQ QUESTIONS 1-9: 6

## 2019-09-06 ENCOUNTER — AMBULATORY - HEALTHEAST (OUTPATIENT)
Dept: FAMILY MEDICINE | Facility: CLINIC | Age: 49
End: 2019-09-06

## 2019-09-06 DIAGNOSIS — R45.4 IRRITABILITY: ICD-10-CM

## 2019-09-06 DIAGNOSIS — G89.29 CHRONIC BILATERAL LOW BACK PAIN WITH BILATERAL SCIATICA: ICD-10-CM

## 2019-09-06 DIAGNOSIS — M54.41 CHRONIC BILATERAL LOW BACK PAIN WITH BILATERAL SCIATICA: ICD-10-CM

## 2019-09-06 DIAGNOSIS — M54.42 CHRONIC BILATERAL LOW BACK PAIN WITH BILATERAL SCIATICA: ICD-10-CM

## 2019-09-09 ENCOUNTER — COMMUNICATION - HEALTHEAST (OUTPATIENT)
Dept: FAMILY MEDICINE | Facility: CLINIC | Age: 49
End: 2019-09-09

## 2019-09-09 ENCOUNTER — AMBULATORY - HEALTHEAST (OUTPATIENT)
Dept: FAMILY MEDICINE | Facility: CLINIC | Age: 49
End: 2019-09-09

## 2019-09-09 DIAGNOSIS — M54.41 CHRONIC BILATERAL LOW BACK PAIN WITH BILATERAL SCIATICA: ICD-10-CM

## 2019-09-09 DIAGNOSIS — M54.42 CHRONIC BILATERAL LOW BACK PAIN WITH BILATERAL SCIATICA: ICD-10-CM

## 2019-09-09 DIAGNOSIS — G89.29 CHRONIC BILATERAL LOW BACK PAIN WITH BILATERAL SCIATICA: ICD-10-CM

## 2019-09-09 DIAGNOSIS — M54.16 LUMBAR RADICULOPATHY: ICD-10-CM

## 2019-09-10 ENCOUNTER — COMMUNICATION - HEALTHEAST (OUTPATIENT)
Dept: FAMILY MEDICINE | Facility: CLINIC | Age: 49
End: 2019-09-10

## 2019-09-11 ENCOUNTER — COMMUNICATION - HEALTHEAST (OUTPATIENT)
Dept: FAMILY MEDICINE | Facility: CLINIC | Age: 49
End: 2019-09-11

## 2019-09-11 ENCOUNTER — AMBULATORY - HEALTHEAST (OUTPATIENT)
Dept: FAMILY MEDICINE | Facility: CLINIC | Age: 49
End: 2019-09-11

## 2019-09-11 ENCOUNTER — AMBULATORY - HEALTHEAST (OUTPATIENT)
Dept: NEUROSURGERY | Facility: CLINIC | Age: 49
End: 2019-09-11

## 2019-09-11 DIAGNOSIS — M54.42 CHRONIC BILATERAL LOW BACK PAIN WITH BILATERAL SCIATICA: ICD-10-CM

## 2019-09-11 DIAGNOSIS — M54.16 LEFT LUMBAR RADICULITIS: ICD-10-CM

## 2019-09-11 DIAGNOSIS — M62.838 MUSCLE SPASM: ICD-10-CM

## 2019-09-11 DIAGNOSIS — M54.41 CHRONIC BILATERAL LOW BACK PAIN WITH BILATERAL SCIATICA: ICD-10-CM

## 2019-09-11 DIAGNOSIS — M54.9 BACK PAIN: ICD-10-CM

## 2019-09-11 DIAGNOSIS — G89.29 CHRONIC BILATERAL LOW BACK PAIN WITH BILATERAL SCIATICA: ICD-10-CM

## 2019-09-24 ENCOUNTER — HOSPITAL ENCOUNTER (OUTPATIENT)
Dept: MRI IMAGING | Facility: HOSPITAL | Age: 49
Discharge: HOME | End: 2019-09-24
Attending: SURGERY

## 2019-09-24 DIAGNOSIS — M54.9 BACK PAIN: ICD-10-CM

## 2019-09-25 ENCOUNTER — COMMUNICATION - HEALTHEAST (OUTPATIENT)
Dept: FAMILY MEDICINE | Facility: CLINIC | Age: 49
End: 2019-09-25

## 2019-09-27 ENCOUNTER — AMBULATORY - HEALTHEAST (OUTPATIENT)
Dept: FAMILY MEDICINE | Facility: CLINIC | Age: 49
End: 2019-09-27

## 2019-09-27 ENCOUNTER — COMMUNICATION - HEALTHEAST (OUTPATIENT)
Dept: FAMILY MEDICINE | Facility: CLINIC | Age: 49
End: 2019-09-27

## 2019-09-27 DIAGNOSIS — F98.8 ATTENTION DEFICIT DISORDER (ADD) WITHOUT HYPERACTIVITY: ICD-10-CM

## 2019-10-03 ENCOUNTER — HOSPITAL ENCOUNTER (OUTPATIENT)
Dept: RADIOLOGY | Facility: CLINIC | Age: 49
Discharge: HOME | End: 2019-10-03
Attending: SURGERY

## 2019-10-03 ENCOUNTER — OFFICE VISIT - HEALTHEAST (OUTPATIENT)
Dept: NEUROSURGERY | Facility: CLINIC | Age: 49
End: 2019-10-03

## 2019-10-03 DIAGNOSIS — M54.16 LUMBAR RADICULOPATHY: ICD-10-CM

## 2019-10-03 DIAGNOSIS — R29.898 HAND WEAKNESS: ICD-10-CM

## 2019-10-03 ASSESSMENT — MIFFLIN-ST. JEOR: SCORE: 1953.95

## 2019-10-06 ENCOUNTER — COMMUNICATION - HEALTHEAST (OUTPATIENT)
Dept: FAMILY MEDICINE | Facility: CLINIC | Age: 49
End: 2019-10-06

## 2019-10-07 ENCOUNTER — AMBULATORY - HEALTHEAST (OUTPATIENT)
Dept: FAMILY MEDICINE | Facility: CLINIC | Age: 49
End: 2019-10-07

## 2019-10-07 ENCOUNTER — COMMUNICATION - HEALTHEAST (OUTPATIENT)
Dept: FAMILY MEDICINE | Facility: CLINIC | Age: 49
End: 2019-10-07

## 2019-10-07 ENCOUNTER — HOSPITAL ENCOUNTER (OUTPATIENT)
Dept: MRI IMAGING | Facility: CLINIC | Age: 49
Discharge: HOME | End: 2019-10-07
Attending: SURGERY

## 2019-10-07 DIAGNOSIS — G89.29 CHRONIC BILATERAL LOW BACK PAIN WITH BILATERAL SCIATICA: ICD-10-CM

## 2019-10-07 DIAGNOSIS — R45.4 IRRITABILITY: ICD-10-CM

## 2019-10-07 DIAGNOSIS — R29.898 HAND WEAKNESS: ICD-10-CM

## 2019-10-07 DIAGNOSIS — M54.42 CHRONIC BILATERAL LOW BACK PAIN WITH BILATERAL SCIATICA: ICD-10-CM

## 2019-10-07 DIAGNOSIS — M54.41 CHRONIC BILATERAL LOW BACK PAIN WITH BILATERAL SCIATICA: ICD-10-CM

## 2019-10-10 ENCOUNTER — HOSPITAL ENCOUNTER (OUTPATIENT)
Dept: PHYSICAL MEDICINE AND REHAB | Facility: CLINIC | Age: 49
Discharge: HOME | End: 2019-10-10
Attending: NURSE PRACTITIONER

## 2019-10-10 ENCOUNTER — OFFICE VISIT - HEALTHEAST (OUTPATIENT)
Dept: NEUROSURGERY | Facility: CLINIC | Age: 49
End: 2019-10-10

## 2019-10-10 DIAGNOSIS — M54.2 PAIN OF CERVICAL FACET JOINT: ICD-10-CM

## 2019-10-10 DIAGNOSIS — M48.02 FORAMINAL STENOSIS OF CERVICAL REGION: ICD-10-CM

## 2019-10-10 DIAGNOSIS — M79.18 MYOFASCIAL PAIN: ICD-10-CM

## 2019-10-10 DIAGNOSIS — G89.29 CHRONIC NECK PAIN: ICD-10-CM

## 2019-10-10 DIAGNOSIS — M54.41 CHRONIC BILATERAL LOW BACK PAIN WITH BILATERAL SCIATICA: ICD-10-CM

## 2019-10-10 DIAGNOSIS — M48.061 FORAMINAL STENOSIS OF LUMBAR REGION: ICD-10-CM

## 2019-10-10 DIAGNOSIS — M54.16 LUMBAR RADICULOPATHY: ICD-10-CM

## 2019-10-10 DIAGNOSIS — M48.02 CERVICAL STENOSIS OF SPINAL CANAL: ICD-10-CM

## 2019-10-10 DIAGNOSIS — M54.42 CHRONIC BILATERAL LOW BACK PAIN WITH BILATERAL SCIATICA: ICD-10-CM

## 2019-10-10 DIAGNOSIS — G89.29 CHRONIC BILATERAL LOW BACK PAIN WITH BILATERAL SCIATICA: ICD-10-CM

## 2019-10-10 DIAGNOSIS — M47.812 ARTHROPATHY OF CERVICAL FACET JOINT: ICD-10-CM

## 2019-10-10 DIAGNOSIS — G60.9 IDIOPATHIC PERIPHERAL NEUROPATHY: ICD-10-CM

## 2019-10-10 DIAGNOSIS — M54.2 CHRONIC NECK PAIN: ICD-10-CM

## 2019-10-10 ASSESSMENT — MIFFLIN-ST. JEOR: SCORE: 1917.67

## 2019-10-11 ENCOUNTER — HOSPITAL ENCOUNTER (OUTPATIENT)
Dept: PHYSICAL MEDICINE AND REHAB | Facility: CLINIC | Age: 49
Discharge: HOME | End: 2019-10-11
Attending: PAIN MEDICINE

## 2019-10-11 DIAGNOSIS — M54.2 CHRONIC NECK PAIN: ICD-10-CM

## 2019-10-11 DIAGNOSIS — M54.2 PAIN OF CERVICAL FACET JOINT: ICD-10-CM

## 2019-10-11 DIAGNOSIS — G89.29 CHRONIC NECK PAIN: ICD-10-CM

## 2019-10-11 DIAGNOSIS — M47.812 ARTHROPATHY OF CERVICAL FACET JOINT: ICD-10-CM

## 2019-10-20 ENCOUNTER — COMMUNICATION - HEALTHEAST (OUTPATIENT)
Dept: FAMILY MEDICINE | Facility: CLINIC | Age: 49
End: 2019-10-20

## 2019-10-20 DIAGNOSIS — K21.9 ESOPHAGEAL REFLUX: ICD-10-CM

## 2019-10-22 ENCOUNTER — COMMUNICATION - HEALTHEAST (OUTPATIENT)
Dept: PALLIATIVE MEDICINE | Facility: OTHER | Age: 49
End: 2019-10-22

## 2019-10-22 ENCOUNTER — HOSPITAL ENCOUNTER (OUTPATIENT)
Dept: PALLIATIVE MEDICINE | Facility: OTHER | Age: 49
Discharge: HOME | End: 2019-10-22
Attending: NURSE PRACTITIONER

## 2019-10-22 ENCOUNTER — COMMUNICATION - HEALTHEAST (OUTPATIENT)
Dept: FAMILY MEDICINE | Facility: CLINIC | Age: 49
End: 2019-10-22

## 2019-10-22 DIAGNOSIS — G89.29 CHRONIC BILATERAL LOW BACK PAIN WITH BILATERAL SCIATICA: ICD-10-CM

## 2019-10-22 DIAGNOSIS — M54.41 CHRONIC BILATERAL LOW BACK PAIN WITH BILATERAL SCIATICA: ICD-10-CM

## 2019-10-22 DIAGNOSIS — F98.8 ATTENTION DEFICIT DISORDER (ADD) WITHOUT HYPERACTIVITY: ICD-10-CM

## 2019-10-22 DIAGNOSIS — G89.4 CHRONIC PAIN SYNDROME: ICD-10-CM

## 2019-10-22 DIAGNOSIS — M54.42 CHRONIC BILATERAL LOW BACK PAIN WITH BILATERAL SCIATICA: ICD-10-CM

## 2019-10-22 DIAGNOSIS — M54.16 LUMBAR RADICULOPATHY: ICD-10-CM

## 2019-10-22 ASSESSMENT — MIFFLIN-ST. JEOR: SCORE: 1969.83

## 2019-10-23 ENCOUNTER — TRANSFERRED RECORDS (OUTPATIENT)
Dept: HEALTH INFORMATION MANAGEMENT | Facility: CLINIC | Age: 49
End: 2019-10-23

## 2019-10-23 ENCOUNTER — HOSPITAL ENCOUNTER (OUTPATIENT)
Dept: PHYSICAL MEDICINE AND REHAB | Facility: CLINIC | Age: 49
Discharge: HOME | End: 2019-10-23
Attending: SURGERY

## 2019-10-23 DIAGNOSIS — G60.9 IDIOPATHIC PERIPHERAL NEUROPATHY: ICD-10-CM

## 2019-10-23 DIAGNOSIS — M54.16 LUMBAR RADICULOPATHY: ICD-10-CM

## 2019-10-24 ENCOUNTER — COMMUNICATION - HEALTHEAST (OUTPATIENT)
Dept: FAMILY MEDICINE | Facility: CLINIC | Age: 49
End: 2019-10-24

## 2019-10-24 DIAGNOSIS — F98.8 ATTENTION DEFICIT DISORDER (ADD) WITHOUT HYPERACTIVITY: ICD-10-CM

## 2019-10-25 LAB
6MAM UR QL: NOT DETECTED
7AMINOCLONAZEPAM UR QL: NOT DETECTED
A-OH ALPRAZ UR QL: NOT DETECTED
ALPRAZ UR QL: NOT DETECTED
AMPHET UR QL SCN: NOT DETECTED
BARBITURATES UR QL: NOT DETECTED
BUPRENORPHINE UR QL: NOT DETECTED
BZE UR QL: NOT DETECTED
CARBOXYTHC UR QL: NOT DETECTED
CARISOPRODOL UR QL: NOT DETECTED
CLONAZEPAM UR QL: NOT DETECTED
CODEINE UR QL: NOT DETECTED
CREAT UR-MCNC: <20 MG/DL (ref 20–400)
DIAZEPAM UR QL: NOT DETECTED
ETHYL GLUCURONIDE UR QL: NOT DETECTED
FENTANYL UR QL: NOT DETECTED
HYDROCODONE UR QL: NOT DETECTED
HYDROMORPHONE UR QL: NOT DETECTED
LORAZEPAM UR QL: NOT DETECTED
MDA UR QL: NOT DETECTED
MDEA UR QL: NOT DETECTED
MDMA UR QL: NOT DETECTED
ME-PHENIDATE UR QL: NOT DETECTED
MEPERIDINE UR QL: NOT DETECTED
METHADONE UR QL: NOT DETECTED
METHAMPHET UR QL: NOT DETECTED
MIDAZOLAM UR QL SCN: NOT DETECTED
MORPHINE UR QL: NOT DETECTED
NORBUPRENORPHINE UR QL CFM: NOT DETECTED
NORDIAZEPAM UR QL: NOT DETECTED
NORFENTANYL UR QL: NOT DETECTED
NORHYDROCODONE UR QL CFM: NOT DETECTED
NOROXYCODONE UR QL CFM: NOT DETECTED
NOROXYMORPHONE UR QL SCN: NOT DETECTED
OXAZEPAM UR QL: NOT DETECTED
OXYCODONE UR QL: NOT DETECTED
OXYMORPHONE UR QL: NOT DETECTED
PATHOLOGY STUDY: ABNORMAL
PCP UR QL: NOT DETECTED
PHENTERMINE UR QL: NOT DETECTED
PROPOXYPH UR QL: NOT DETECTED
SERVICE CMNT-IMP: ABNORMAL
TAPENTADOL UR QL SCN: NOT DETECTED
TAPENTADOL UR QL SCN: NOT DETECTED
TEMAZEPAM UR QL: NOT DETECTED
TRAMADOL UR QL: NOT DETECTED
ZOLPIDEM UR QL: NOT DETECTED

## 2019-10-28 ENCOUNTER — COMMUNICATION - HEALTHEAST (OUTPATIENT)
Dept: PALLIATIVE MEDICINE | Facility: OTHER | Age: 49
End: 2019-10-28

## 2019-10-28 DIAGNOSIS — G89.4 CHRONIC PAIN SYNDROME: ICD-10-CM

## 2019-10-29 ENCOUNTER — COMMUNICATION - HEALTHEAST (OUTPATIENT)
Dept: NEUROSURGERY | Facility: CLINIC | Age: 49
End: 2019-10-29

## 2019-10-30 LAB
6MAM UR QL: NOT DETECTED
7AMINOCLONAZEPAM UR QL: NOT DETECTED
A-OH ALPRAZ UR QL: NOT DETECTED
ALPRAZ UR QL: NOT DETECTED
AMPHET UR QL SCN: PRESENT
BARBITURATES UR QL: NOT DETECTED
BUPRENORPHINE UR QL: NOT DETECTED
BZE UR QL: NOT DETECTED
CARBOXYTHC UR QL: PRESENT
CARISOPRODOL UR QL: NOT DETECTED
CLONAZEPAM UR QL: NOT DETECTED
CODEINE UR QL: NOT DETECTED
CREAT UR-MCNC: 131 MG/DL (ref 20–400)
DIAZEPAM UR QL: NOT DETECTED
ETHYL GLUCURONIDE UR QL: PRESENT
FENTANYL UR QL: NOT DETECTED
HYDROCODONE UR QL: NOT DETECTED
HYDROMORPHONE UR QL: NOT DETECTED
LORAZEPAM UR QL: NOT DETECTED
MDA UR QL: NOT DETECTED
MDEA UR QL: NOT DETECTED
MDMA UR QL: NOT DETECTED
ME-PHENIDATE UR QL: NOT DETECTED
MEPERIDINE UR QL: NOT DETECTED
METHADONE UR QL: NOT DETECTED
METHAMPHET UR QL: NOT DETECTED
MIDAZOLAM UR QL SCN: NOT DETECTED
MORPHINE UR QL: NOT DETECTED
NORBUPRENORPHINE UR QL CFM: NOT DETECTED
NORDIAZEPAM UR QL: NOT DETECTED
NORFENTANYL UR QL: NOT DETECTED
NORHYDROCODONE UR QL CFM: NOT DETECTED
NOROXYCODONE UR QL CFM: NOT DETECTED
NOROXYMORPHONE UR QL SCN: NOT DETECTED
OXAZEPAM UR QL: NOT DETECTED
OXYCODONE UR QL: NOT DETECTED
OXYMORPHONE UR QL: NOT DETECTED
PATHOLOGY STUDY: NORMAL
PCP UR QL: NOT DETECTED
PHENTERMINE UR QL: NOT DETECTED
PROPOXYPH UR QL: NOT DETECTED
SERVICE CMNT-IMP: NORMAL
TAPENTADOL UR QL SCN: NOT DETECTED
TAPENTADOL UR QL SCN: NOT DETECTED
TEMAZEPAM UR QL: NOT DETECTED
TRAMADOL UR QL: NOT DETECTED
ZOLPIDEM UR QL: NOT DETECTED

## 2019-11-01 ENCOUNTER — COMMUNICATION - HEALTHEAST (OUTPATIENT)
Dept: PHYSICAL MEDICINE AND REHAB | Facility: CLINIC | Age: 49
End: 2019-11-01

## 2019-11-03 LAB
CARBOXYTHC UR-MCNC: >500 NG/ML
ETHYL GLUCURONIDE UR CFM-MCNC: 943 NG/ML
ETHYL SULFATE UR CFM-MCNC: 460 NG/ML

## 2019-11-11 ENCOUNTER — HOSPITAL ENCOUNTER (OUTPATIENT)
Dept: PALLIATIVE MEDICINE | Facility: OTHER | Age: 49
Discharge: HOME | End: 2019-11-11
Attending: NURSE PRACTITIONER

## 2019-11-11 DIAGNOSIS — M54.42 CHRONIC BILATERAL LOW BACK PAIN WITH BILATERAL SCIATICA: ICD-10-CM

## 2019-11-11 DIAGNOSIS — G89.4 CHRONIC PAIN SYNDROME: ICD-10-CM

## 2019-11-11 DIAGNOSIS — M54.41 CHRONIC BILATERAL LOW BACK PAIN WITH BILATERAL SCIATICA: ICD-10-CM

## 2019-11-11 DIAGNOSIS — M54.16 LUMBAR RADICULOPATHY: ICD-10-CM

## 2019-11-11 DIAGNOSIS — G89.29 CHRONIC BILATERAL LOW BACK PAIN WITH BILATERAL SCIATICA: ICD-10-CM

## 2019-11-11 ASSESSMENT — MIFFLIN-ST. JEOR: SCORE: 1967.56

## 2019-11-12 ENCOUNTER — OFFICE VISIT - HEALTHEAST (OUTPATIENT)
Dept: NEUROSURGERY | Facility: CLINIC | Age: 49
End: 2019-11-12

## 2019-11-12 DIAGNOSIS — M54.12 CERVICAL RADICULOPATHY: ICD-10-CM

## 2019-11-12 DIAGNOSIS — G62.9 NEUROPATHY: ICD-10-CM

## 2019-11-12 ASSESSMENT — MIFFLIN-ST. JEOR: SCORE: 1967.56

## 2019-11-24 ENCOUNTER — COMMUNICATION - HEALTHEAST (OUTPATIENT)
Dept: PALLIATIVE MEDICINE | Facility: OTHER | Age: 49
End: 2019-11-24

## 2019-11-24 DIAGNOSIS — M54.41 CHRONIC BILATERAL LOW BACK PAIN WITH BILATERAL SCIATICA: ICD-10-CM

## 2019-11-24 DIAGNOSIS — G89.4 CHRONIC PAIN SYNDROME: ICD-10-CM

## 2019-11-24 DIAGNOSIS — M54.42 CHRONIC BILATERAL LOW BACK PAIN WITH BILATERAL SCIATICA: ICD-10-CM

## 2019-11-24 DIAGNOSIS — G89.29 CHRONIC BILATERAL LOW BACK PAIN WITH BILATERAL SCIATICA: ICD-10-CM

## 2019-11-24 DIAGNOSIS — M54.16 LUMBAR RADICULOPATHY: ICD-10-CM

## 2019-11-25 ENCOUNTER — HOSPITAL ENCOUNTER (OUTPATIENT)
Dept: PHYSICAL MEDICINE AND REHAB | Facility: CLINIC | Age: 49
Discharge: HOME | End: 2019-11-25
Attending: SURGERY

## 2019-11-25 ENCOUNTER — COMMUNICATION - HEALTHEAST (OUTPATIENT)
Dept: FAMILY MEDICINE | Facility: CLINIC | Age: 49
End: 2019-11-25

## 2019-11-25 DIAGNOSIS — F98.8 ATTENTION DEFICIT DISORDER (ADD) WITHOUT HYPERACTIVITY: ICD-10-CM

## 2019-11-25 DIAGNOSIS — M54.12 CERVICAL RADICULOPATHY: ICD-10-CM

## 2019-11-26 ENCOUNTER — COMMUNICATION - HEALTHEAST (OUTPATIENT)
Dept: PHYSICAL MEDICINE AND REHAB | Facility: CLINIC | Age: 49
End: 2019-11-26

## 2019-12-26 ENCOUNTER — COMMUNICATION - HEALTHEAST (OUTPATIENT)
Dept: FAMILY MEDICINE | Facility: CLINIC | Age: 49
End: 2019-12-26

## 2019-12-26 DIAGNOSIS — F98.8 ATTENTION DEFICIT DISORDER (ADD) WITHOUT HYPERACTIVITY: ICD-10-CM

## 2019-12-27 ENCOUNTER — COMMUNICATION - HEALTHEAST (OUTPATIENT)
Dept: PHYSICAL MEDICINE AND REHAB | Facility: CLINIC | Age: 49
End: 2019-12-27

## 2019-12-30 ENCOUNTER — COMMUNICATION - HEALTHEAST (OUTPATIENT)
Dept: FAMILY MEDICINE | Facility: CLINIC | Age: 49
End: 2019-12-30

## 2020-01-09 ENCOUNTER — RECORDS - HEALTHEAST (OUTPATIENT)
Dept: ADMINISTRATIVE | Facility: OTHER | Age: 50
End: 2020-01-09

## 2020-01-09 ENCOUNTER — TRANSFERRED RECORDS (OUTPATIENT)
Dept: HEALTH INFORMATION MANAGEMENT | Facility: CLINIC | Age: 50
End: 2020-01-09

## 2020-01-10 ENCOUNTER — RECORDS - HEALTHEAST (OUTPATIENT)
Dept: LAB | Facility: HOSPITAL | Age: 50
End: 2020-01-10

## 2020-01-10 LAB
ALBUMIN SERPL-MCNC: 4.3 G/DL (ref 3.5–5)
ALP SERPL-CCNC: 68 U/L (ref 45–120)
ALT SERPL W P-5'-P-CCNC: 18 U/L (ref 0–45)
ANION GAP SERPL CALCULATED.3IONS-SCNC: 8 MMOL/L (ref 5–18)
AST SERPL W P-5'-P-CCNC: 22 U/L (ref 0–40)
BILIRUB SERPL-MCNC: 0.9 MG/DL (ref 0–1)
BUN SERPL-MCNC: 11 MG/DL (ref 8–22)
CALCIUM SERPL-MCNC: 9.4 MG/DL (ref 8.5–10.5)
CHLORIDE BLD-SCNC: 104 MMOL/L (ref 98–107)
CO2 SERPL-SCNC: 29 MMOL/L (ref 22–31)
CREAT SERPL-MCNC: 0.92 MG/DL (ref 0.7–1.3)
FERRITIN SERPL-MCNC: 209 NG/ML (ref 27–300)
FOLATE SERPL-MCNC: 11.6 NG/ML
GFR SERPL CREATININE-BSD FRML MDRD: >60 ML/MIN/1.73M2
GLUCOSE BLD-MCNC: 94 MG/DL (ref 70–125)
IRON SATN MFR SERPL: 47 % (ref 20–50)
IRON SERPL-MCNC: 153 UG/DL (ref 42–175)
LYME TOTAL ANTIBODY - HISTORICAL: 0.04 INDEX VALUE
POTASSIUM BLD-SCNC: 4.1 MMOL/L (ref 3.5–5)
PROT SERPL-MCNC: 7 G/DL (ref 6–8)
RHEUMATOID FACT SERPL-ACNC: <15 IU/ML (ref 0–30)
SODIUM SERPL-SCNC: 141 MMOL/L (ref 136–145)
T4 FREE SERPL-MCNC: 0.9 NG/DL (ref 0.7–1.8)
TIBC SERPL-MCNC: 323 UG/DL (ref 313–563)
TRANSFERRIN SERPL-MCNC: 259 MG/DL (ref 212–360)
TSH SERPL DL<=0.005 MIU/L-ACNC: 0.28 UIU/ML (ref 0.3–5)
VIT B12 SERPL-MCNC: 397 PG/ML (ref 213–816)

## 2020-01-11 LAB — METHYLMALONATE SERPL-SCNC: 0.17 UMOL/L (ref 0–0.4)

## 2020-01-12 LAB
ACE SERPL-CCNC: 24 U/L (ref 9–67)
COPPER SERPL-MCNC: 82.4 UG/DL (ref 70–140)

## 2020-01-13 LAB
A-TOCOPHEROL VIT E SERPL-MCNC: 8.8 MG/L (ref 5.5–18)
ALBUMIN PERCENT: 66.2 % (ref 51–67)
ALBUMIN SERPL ELPH-MCNC: 4.6 G/DL (ref 3.2–4.7)
ALPHA 1 PERCENT: 2.7 % (ref 2–4)
ALPHA 2 PERCENT: 9.3 % (ref 5–13)
ALPHA1 GLOB SERPL ELPH-MCNC: 0.2 G/DL (ref 0.1–0.3)
ALPHA2 GLOB SERPL ELPH-MCNC: 0.6 G/DL (ref 0.4–0.9)
ANA SER QL: 0.1 U
B-GLOBULIN SERPL ELPH-MCNC: 0.7 G/DL (ref 0.7–1.2)
BETA PERCENT: 10.7 % (ref 10–17)
BETA+GAMMA TOCOPHEROL SERPL-MCNC: 1.2 MG/L (ref 0–6)
CERULOPLASMIN SERPL-MCNC: 11 MG/DL (ref 20–60)
GAMMA GLOB SERPL ELPH-MCNC: 0.8 G/DL (ref 0.6–1.4)
GAMMA GLOBULIN PERCENT: 11.1 % (ref 9–20)
PATH ICD:: NORMAL
PATH ICD:: NORMAL
PROT PATTERN SERPL ELPH-IMP: NORMAL
PROT PATTERN SERPL IFE-IMP: NORMAL
PROT SERPL-MCNC: 6.9 G/DL (ref 6–8)
REVIEWING PATHOLOGIST: NORMAL
REVIEWING PATHOLOGIST: NORMAL
VIT B1 PYROPHOSHATE BLD-SCNC: 85 NMOL/L (ref 70–180)

## 2020-01-14 ENCOUNTER — OFFICE VISIT - HEALTHEAST (OUTPATIENT)
Dept: FAMILY MEDICINE | Facility: CLINIC | Age: 50
End: 2020-01-14

## 2020-01-14 ENCOUNTER — RECORDS - HEALTHEAST (OUTPATIENT)
Dept: ADMINISTRATIVE | Facility: OTHER | Age: 50
End: 2020-01-14

## 2020-01-14 DIAGNOSIS — M54.12 CERVICAL RADICULOPATHY: ICD-10-CM

## 2020-01-14 DIAGNOSIS — R25.1 TREMOR: ICD-10-CM

## 2020-01-14 DIAGNOSIS — M54.2 NECK PAIN: ICD-10-CM

## 2020-01-14 LAB
ASIALO GM1 IGG: NEGATIVE
ASIALO GM1 IGM: NEGATIVE
GM1 ABY IGG: NEGATIVE
GM1 ABY IGM: NEGATIVE
IGG DISIALO. GD1B: NEGATIVE
IGM DISIALO. GD1B: NEGATIVE
SS-A/RO AUTOANTIBODIES - HISTORICAL: 1 EU
SS-B/LA AUTOANTIBODIES - HISTORICAL: 0 EU

## 2020-01-19 ENCOUNTER — HOSPITAL ENCOUNTER (OUTPATIENT)
Dept: MRI IMAGING | Facility: CLINIC | Age: 50
Discharge: HOME | End: 2020-01-19
Attending: PSYCHIATRY & NEUROLOGY

## 2020-01-19 DIAGNOSIS — G25.0 ESSENTIAL TREMOR: ICD-10-CM

## 2020-01-19 DIAGNOSIS — G25.81 RLS (RESTLESS LEGS SYNDROME): ICD-10-CM

## 2020-01-19 DIAGNOSIS — G60.8 MIXED SENSORY-MOTOR POLYNEUROPATHY: ICD-10-CM

## 2020-01-20 ENCOUNTER — RECORDS - HEALTHEAST (OUTPATIENT)
Dept: ADMINISTRATIVE | Facility: OTHER | Age: 50
End: 2020-01-20

## 2020-01-20 ENCOUNTER — AMBULATORY - HEALTHEAST (OUTPATIENT)
Dept: FAMILY MEDICINE | Facility: CLINIC | Age: 50
End: 2020-01-20

## 2020-01-20 ENCOUNTER — COMMUNICATION - HEALTHEAST (OUTPATIENT)
Dept: PALLIATIVE MEDICINE | Facility: OTHER | Age: 50
End: 2020-01-20

## 2020-01-21 LAB — ARUP MISCELLANEOUS TEST: NORMAL

## 2020-01-22 LAB
MISCELLANEOUS TEST DEPT. - HE HISTORICAL: NORMAL
PERFORMING LAB: NORMAL
SPECIMEN STATUS: NORMAL
TEST NAME: NORMAL

## 2020-01-23 ENCOUNTER — OFFICE VISIT - HEALTHEAST (OUTPATIENT)
Dept: NEUROSURGERY | Facility: CLINIC | Age: 50
End: 2020-01-23

## 2020-01-23 DIAGNOSIS — G62.9 NEUROPATHY: ICD-10-CM

## 2020-01-23 DIAGNOSIS — M54.12 CERVICAL RADICULOPATHY: ICD-10-CM

## 2020-01-23 ASSESSMENT — MIFFLIN-ST. JEOR: SCORE: 1985.7

## 2020-01-27 ENCOUNTER — COMMUNICATION - HEALTHEAST (OUTPATIENT)
Dept: FAMILY MEDICINE | Facility: CLINIC | Age: 50
End: 2020-01-27

## 2020-01-27 DIAGNOSIS — F98.8 ATTENTION DEFICIT DISORDER (ADD) WITHOUT HYPERACTIVITY: ICD-10-CM

## 2020-02-18 ENCOUNTER — COMMUNICATION - HEALTHEAST (OUTPATIENT)
Dept: PHYSICAL MEDICINE AND REHAB | Facility: CLINIC | Age: 50
End: 2020-02-18

## 2020-02-18 ENCOUNTER — RECORDS - HEALTHEAST (OUTPATIENT)
Dept: ADMINISTRATIVE | Facility: OTHER | Age: 50
End: 2020-02-18

## 2020-02-18 DIAGNOSIS — M47.812 SPONDYLOSIS OF CERVICAL REGION WITHOUT MYELOPATHY OR RADICULOPATHY: ICD-10-CM

## 2020-02-18 DIAGNOSIS — M54.50 LUMBALGIA: ICD-10-CM

## 2020-02-18 DIAGNOSIS — M54.16 LUMBAR RADICULOPATHY: ICD-10-CM

## 2020-02-18 DIAGNOSIS — M54.12 CERVICAL RADICULAR PAIN: ICD-10-CM

## 2020-02-25 ENCOUNTER — COMMUNICATION - HEALTHEAST (OUTPATIENT)
Dept: FAMILY MEDICINE | Facility: CLINIC | Age: 50
End: 2020-02-25

## 2020-02-25 DIAGNOSIS — F98.8 ATTENTION DEFICIT DISORDER (ADD) WITHOUT HYPERACTIVITY: ICD-10-CM

## 2020-03-03 ENCOUNTER — COMMUNICATION - HEALTHEAST (OUTPATIENT)
Dept: FAMILY MEDICINE | Facility: CLINIC | Age: 50
End: 2020-03-03

## 2020-03-03 DIAGNOSIS — G89.29 CHRONIC BILATERAL LOW BACK PAIN WITH BILATERAL SCIATICA: ICD-10-CM

## 2020-03-03 DIAGNOSIS — R45.4 IRRITABILITY: ICD-10-CM

## 2020-03-03 DIAGNOSIS — M54.41 CHRONIC BILATERAL LOW BACK PAIN WITH BILATERAL SCIATICA: ICD-10-CM

## 2020-03-03 DIAGNOSIS — M54.42 CHRONIC BILATERAL LOW BACK PAIN WITH BILATERAL SCIATICA: ICD-10-CM

## 2020-03-07 ENCOUNTER — COMMUNICATION - HEALTHEAST (OUTPATIENT)
Dept: SCHEDULING | Facility: CLINIC | Age: 50
End: 2020-03-07

## 2020-03-07 DIAGNOSIS — R45.4 IRRITABILITY: ICD-10-CM

## 2020-03-07 DIAGNOSIS — M54.42 CHRONIC BILATERAL LOW BACK PAIN WITH BILATERAL SCIATICA: ICD-10-CM

## 2020-03-07 DIAGNOSIS — M54.41 CHRONIC BILATERAL LOW BACK PAIN WITH BILATERAL SCIATICA: ICD-10-CM

## 2020-03-07 DIAGNOSIS — G89.29 CHRONIC BILATERAL LOW BACK PAIN WITH BILATERAL SCIATICA: ICD-10-CM

## 2020-03-11 ENCOUNTER — RECORDS - HEALTHEAST (OUTPATIENT)
Dept: ADMINISTRATIVE | Facility: OTHER | Age: 50
End: 2020-03-11

## 2020-03-18 ENCOUNTER — COMMUNICATION - HEALTHEAST (OUTPATIENT)
Dept: FAMILY MEDICINE | Facility: CLINIC | Age: 50
End: 2020-03-18

## 2020-03-18 DIAGNOSIS — M10.9 ACUTE GOUTY ARTHROPATHY: ICD-10-CM

## 2020-03-19 ENCOUNTER — RECORDS - HEALTHEAST (OUTPATIENT)
Dept: ADMINISTRATIVE | Facility: OTHER | Age: 50
End: 2020-03-19

## 2020-03-26 ENCOUNTER — COMMUNICATION - HEALTHEAST (OUTPATIENT)
Dept: FAMILY MEDICINE | Facility: CLINIC | Age: 50
End: 2020-03-26

## 2020-03-26 DIAGNOSIS — F98.8 ATTENTION DEFICIT DISORDER (ADD) WITHOUT HYPERACTIVITY: ICD-10-CM

## 2020-04-26 ENCOUNTER — COMMUNICATION - HEALTHEAST (OUTPATIENT)
Dept: FAMILY MEDICINE | Facility: CLINIC | Age: 50
End: 2020-04-26

## 2020-04-26 DIAGNOSIS — F98.8 ATTENTION DEFICIT DISORDER (ADD) WITHOUT HYPERACTIVITY: ICD-10-CM

## 2020-04-30 ENCOUNTER — OFFICE VISIT - HEALTHEAST (OUTPATIENT)
Dept: FAMILY MEDICINE | Facility: CLINIC | Age: 50
End: 2020-04-30

## 2020-04-30 DIAGNOSIS — G89.4 CHRONIC PAIN SYNDROME: ICD-10-CM

## 2020-04-30 DIAGNOSIS — F98.8 ATTENTION DEFICIT DISORDER (ADD) WITHOUT HYPERACTIVITY: ICD-10-CM

## 2020-04-30 DIAGNOSIS — K21.9 GASTROESOPHAGEAL REFLUX DISEASE, ESOPHAGITIS PRESENCE NOT SPECIFIED: ICD-10-CM

## 2020-04-30 DIAGNOSIS — I10 ESSENTIAL HYPERTENSION: ICD-10-CM

## 2020-04-30 DIAGNOSIS — M10.9 GOUTY ARTHROPATHY: ICD-10-CM

## 2020-05-19 ENCOUNTER — COMMUNICATION - HEALTHEAST (OUTPATIENT)
Dept: FAMILY MEDICINE | Facility: CLINIC | Age: 50
End: 2020-05-19

## 2020-05-19 DIAGNOSIS — M10.9 ACUTE GOUTY ARTHROPATHY: ICD-10-CM

## 2020-05-26 ENCOUNTER — RECORDS - HEALTHEAST (OUTPATIENT)
Dept: ADMINISTRATIVE | Facility: OTHER | Age: 50
End: 2020-05-26

## 2020-05-31 ENCOUNTER — COMMUNICATION - HEALTHEAST (OUTPATIENT)
Dept: FAMILY MEDICINE | Facility: CLINIC | Age: 50
End: 2020-05-31

## 2020-05-31 DIAGNOSIS — F98.8 ATTENTION DEFICIT DISORDER (ADD) WITHOUT HYPERACTIVITY: ICD-10-CM

## 2020-06-17 ENCOUNTER — RECORDS - HEALTHEAST (OUTPATIENT)
Dept: ADMINISTRATIVE | Facility: OTHER | Age: 50
End: 2020-06-17

## 2020-06-18 ENCOUNTER — RECORDS - HEALTHEAST (OUTPATIENT)
Dept: ADMINISTRATIVE | Facility: OTHER | Age: 50
End: 2020-06-18

## 2020-06-26 ENCOUNTER — RECORDS - HEALTHEAST (OUTPATIENT)
Dept: ADMINISTRATIVE | Facility: OTHER | Age: 50
End: 2020-06-26

## 2020-06-29 ENCOUNTER — COMMUNICATION - HEALTHEAST (OUTPATIENT)
Dept: FAMILY MEDICINE | Facility: CLINIC | Age: 50
End: 2020-06-29

## 2020-06-29 DIAGNOSIS — F98.8 ATTENTION DEFICIT DISORDER (ADD) WITHOUT HYPERACTIVITY: ICD-10-CM

## 2020-07-21 ENCOUNTER — COMMUNICATION - HEALTHEAST (OUTPATIENT)
Dept: FAMILY MEDICINE | Facility: CLINIC | Age: 50
End: 2020-07-21

## 2020-07-21 ENCOUNTER — COMMUNICATION - HEALTHEAST (OUTPATIENT)
Dept: SCHEDULING | Facility: CLINIC | Age: 50
End: 2020-07-21

## 2020-07-21 DIAGNOSIS — M10.9 ACUTE GOUTY ARTHROPATHY: ICD-10-CM

## 2020-07-29 ENCOUNTER — COMMUNICATION - HEALTHEAST (OUTPATIENT)
Dept: FAMILY MEDICINE | Facility: CLINIC | Age: 50
End: 2020-07-29

## 2020-07-29 DIAGNOSIS — F98.8 ATTENTION DEFICIT DISORDER (ADD) WITHOUT HYPERACTIVITY: ICD-10-CM

## 2020-08-04 ENCOUNTER — TELEPHONE (OUTPATIENT)
Dept: NEUROLOGY | Facility: CLINIC | Age: 50
End: 2020-08-04

## 2020-08-04 DIAGNOSIS — G25.81 RESTLESS LEG SYNDROME: Primary | ICD-10-CM

## 2020-08-04 RX ORDER — DEXTROAMPHETAMINE SACCHARATE, AMPHETAMINE ASPARTATE, DEXTROAMPHETAMINE SULFATE AND AMPHETAMINE SULFATE 3.75; 3.75; 3.75; 3.75 MG/1; MG/1; MG/1; MG/1
15 TABLET ORAL 2 TIMES DAILY
COMMUNITY
Start: 2020-01-09 | End: 2023-10-23

## 2020-08-04 RX ORDER — DULOXETIN HYDROCHLORIDE 60 MG/1
CAPSULE, DELAYED RELEASE ORAL
COMMUNITY
Start: 2020-03-11 | End: 2023-10-23

## 2020-08-04 RX ORDER — PRAMIPEXOLE DIHYDROCHLORIDE 1.5 MG/1
3 TABLET ORAL AT BEDTIME
COMMUNITY
Start: 2020-05-05 | End: 2023-10-23

## 2020-08-04 RX ORDER — GABAPENTIN 300 MG/1
300 CAPSULE ORAL 2 TIMES DAILY
COMMUNITY
End: 2020-08-07

## 2020-08-04 NOTE — TELEPHONE ENCOUNTER
Spoke with Lizzette- Gregorio had carpal tunnel release surgery on both hands. Despite having that, he is still having a lot of BUE/BLE paresthesias. We have gabapentin as 300mg BID and Dr Nicole has it at 600mg TID. Lizzette wasn't exactly sure how much he was taking and was going to ask Gregorio and send us a Teamiet message.   Meenu Coreas, [place] on 8/4/2020 at 3:44 PM

## 2020-08-07 RX ORDER — GABAPENTIN 300 MG/1
600 CAPSULE ORAL 2 TIMES DAILY
Qty: 60 CAPSULE | Refills: 11 | Status: SHIPPED | OUTPATIENT
Start: 2020-08-07 | End: 2020-09-04

## 2020-08-07 NOTE — TELEPHONE ENCOUNTER
Please see my message below regarding patients symptoms. Dr Nicole increased his gabapentin to 600mg tid but Gregorio is only taking 600mg every day. Please advise  Meenu Coreas CMA on 8/7/2020 at 1:13 PM

## 2020-08-17 ENCOUNTER — COMMUNICATION - HEALTHEAST (OUTPATIENT)
Dept: SCHEDULING | Facility: CLINIC | Age: 50
End: 2020-08-17

## 2020-08-28 ENCOUNTER — TELEPHONE (OUTPATIENT)
Dept: NEUROLOGY | Facility: CLINIC | Age: 50
End: 2020-08-28

## 2020-08-28 NOTE — TELEPHONE ENCOUNTER
Gregorio states that 600mg BID still isn't working. Patient last seen March 2020 in clinic:    Restless legs syndrome (G25.81) Patient likely has restless leg syndrome and currently he is on Mirapex 0.125 mg 2 tablets at bedtime. I have asked him to increase the dose to 0.5 mg at bedtime. He will update me about his progress in 1 week to further increase the dose. Patient does have anxiety and ADHD that makes interpretation of his symptomatology somewhat challenging.    His other issues are idiopathic progressive polyneuropathy and ?essential tremor and once we get a handle of his restless leg syndrome, I will make adjustment in the dose of his gabapentin and eventually address his essential tremors also that likely are accentuation of physiological tremors due to his underlying anxiety.      What would you recommend next?  Meenu Coreas CMA on 8/28/2020 at 3:40 PM

## 2020-08-28 NOTE — TELEPHONE ENCOUNTER
Lizzette spouse calling in regarding her  and the dosage of his medication  put him on and what to do as it does not seam to be working? Her number is [phone] Thank you!

## 2020-08-31 ENCOUNTER — COMMUNICATION - HEALTHEAST (OUTPATIENT)
Dept: FAMILY MEDICINE | Facility: CLINIC | Age: 50
End: 2020-08-31

## 2020-08-31 DIAGNOSIS — F98.8 ATTENTION DEFICIT DISORDER (ADD) WITHOUT HYPERACTIVITY: ICD-10-CM

## 2020-08-31 NOTE — TELEPHONE ENCOUNTER
Lizzette informed- she will call back to schedule the follow up  Meenu Coreas CMA on 8/31/2020 at 2:17 PM

## 2020-09-02 ENCOUNTER — COMMUNICATION - HEALTHEAST (OUTPATIENT)
Dept: FAMILY MEDICINE | Facility: CLINIC | Age: 50
End: 2020-09-02

## 2020-09-02 DIAGNOSIS — F98.8 ATTENTION DEFICIT DISORDER (ADD) WITHOUT HYPERACTIVITY: ICD-10-CM

## 2020-09-03 ENCOUNTER — COMMUNICATION - HEALTHEAST (OUTPATIENT)
Dept: FAMILY MEDICINE | Facility: CLINIC | Age: 50
End: 2020-09-03

## 2020-09-03 PROBLEM — E78.5 HYPERLIPIDEMIA: Status: ACTIVE | Noted: 2020-09-03

## 2020-09-03 PROBLEM — I10 HYPERTENSION: Status: ACTIVE | Noted: 2020-09-03

## 2020-09-03 PROBLEM — G25.0 ESSENTIAL TREMOR: Status: ACTIVE | Noted: 2020-09-03

## 2020-09-03 PROBLEM — F98.8 ATTENTION DEFICIT DISORDER (ADD) WITHOUT HYPERACTIVITY: Status: ACTIVE | Noted: 2019-09-06

## 2020-09-03 PROBLEM — G60.3 IDIOPATHIC PROGRESSIVE POLYNEUROPATHY: Status: ACTIVE | Noted: 2020-09-03

## 2020-09-03 PROBLEM — F41.1 GENERALIZED ANXIETY DISORDER: Status: ACTIVE | Noted: 2020-09-03

## 2020-09-03 PROBLEM — G56.03 BILATERAL CARPAL TUNNEL SYNDROME: Status: ACTIVE | Noted: 2020-09-03

## 2020-09-03 PROBLEM — G25.81 RESTLESS LEGS SYNDROME: Status: ACTIVE | Noted: 2020-09-03

## 2020-09-03 RX ORDER — ALLOPURINOL 300 MG/1
TABLET ORAL
COMMUNITY
Start: 2020-07-21 | End: 2023-10-23

## 2020-09-03 RX ORDER — LOSARTAN POTASSIUM AND HYDROCHLOROTHIAZIDE 12.5; 1 MG/1; MG/1
1 TABLET ORAL
COMMUNITY
Start: 2020-04-30 | End: 2023-10-23

## 2020-09-03 SDOH — HEALTH STABILITY: MENTAL HEALTH: HOW OFTEN DO YOU HAVE A DRINK CONTAINING ALCOHOL?: 2-4 TIMES A MONTH

## 2020-09-03 SDOH — HEALTH STABILITY: MENTAL HEALTH: HOW OFTEN DO YOU HAVE 6 OR MORE DRINKS ON ONE OCCASION?: WEEKLY

## 2020-09-04 ENCOUNTER — OFFICE VISIT (OUTPATIENT)
Dept: NEUROLOGY | Facility: CLINIC | Age: 50
End: 2020-09-04
Payer: COMMERCIAL

## 2020-09-04 ENCOUNTER — AMBULATORY - HEALTHEAST (OUTPATIENT)
Dept: FAMILY MEDICINE | Facility: CLINIC | Age: 50
End: 2020-09-04

## 2020-09-04 ENCOUNTER — RECORDS - HEALTHEAST (OUTPATIENT)
Dept: ADMINISTRATIVE | Facility: OTHER | Age: 50
End: 2020-09-04

## 2020-09-04 ENCOUNTER — RECORDS - HEALTHEAST (OUTPATIENT)
Dept: LAB | Facility: HOSPITAL | Age: 50
End: 2020-09-04

## 2020-09-04 VITALS
WEIGHT: 239 LBS | DIASTOLIC BLOOD PRESSURE: 89 MMHG | BODY MASS INDEX: 32.37 KG/M2 | HEART RATE: 95 BPM | HEIGHT: 72 IN | SYSTOLIC BLOOD PRESSURE: 130 MMHG

## 2020-09-04 DIAGNOSIS — G25.81 RESTLESS LEGS SYNDROME: ICD-10-CM

## 2020-09-04 DIAGNOSIS — G25.0 ESSENTIAL TREMOR: ICD-10-CM

## 2020-09-04 DIAGNOSIS — F98.8 ATTENTION DEFICIT DISORDER (ADD) WITHOUT HYPERACTIVITY: ICD-10-CM

## 2020-09-04 DIAGNOSIS — G25.81 RESTLESS LEG SYNDROME: ICD-10-CM

## 2020-09-04 DIAGNOSIS — G60.3 IDIOPATHIC PROGRESSIVE POLYNEUROPATHY: Primary | ICD-10-CM

## 2020-09-04 PROBLEM — G89.4 CHRONIC PAIN DISORDER: Status: ACTIVE | Noted: 2019-10-22

## 2020-09-04 PROBLEM — D49.59: Status: ACTIVE | Noted: 2020-09-04

## 2020-09-04 PROBLEM — E66.01 MORBID OBESITY (H): Status: ACTIVE | Noted: 2018-10-30

## 2020-09-04 PROBLEM — K50.90 REGIONAL ENTERITIS (H): Status: ACTIVE | Noted: 2020-09-04

## 2020-09-04 PROBLEM — M10.9 GOUT: Status: ACTIVE | Noted: 2020-09-04

## 2020-09-04 PROBLEM — K21.9 ESOPHAGEAL REFLUX: Status: ACTIVE | Noted: 2020-09-04

## 2020-09-04 LAB — FERRITIN SERPL-MCNC: 242 NG/ML (ref 27–300)

## 2020-09-04 PROCEDURE — 99215 OFFICE O/P EST HI 40 MIN: CPT | Performed by: PSYCHIATRY & NEUROLOGY

## 2020-09-04 RX ORDER — GABAPENTIN 300 MG/1
600 CAPSULE ORAL 3 TIMES DAILY
Qty: 180 CAPSULE | Refills: 11 | Status: SHIPPED | OUTPATIENT
Start: 2020-09-04 | End: 2023-10-23

## 2020-09-04 RX ORDER — PRAMIPEXOLE DIHYDROCHLORIDE 0.5 MG/1
TABLET ORAL
Qty: 60 TABLET | Refills: 11 | Status: SHIPPED | OUTPATIENT
Start: 2020-09-04 | End: 2023-10-23

## 2020-09-04 ASSESSMENT — MIFFLIN-ST. JEOR: SCORE: 1977.1

## 2020-09-04 NOTE — PROGRESS NOTES
NEUROLOGY FOLLOW UP VISIT  NOTE       [facility] [place]  [address] [place] [address]  Tel: [phone]  Fax: [phone]  [email or web address]     Rex PEYTON Bond 1970, MRN [id number]  PCP: Anisa Bush, [phone]  Date: 2020     ASSESSMENT & PLAN     Diagnosis code: Idiopathic progressive polyneuropathy                               Essential tremor                               Restless leg syndrome     Idiopathic progressive polyneuropathy  50-year-old male with a history of hypertension, hyperlipidemia, ADHD, generalized anxiety disorder and Crohn's disease who was evaluated for multiple vague symptoms and was diagnosed with idiopathic progressive polyneuropathy.  Extensive lab work for common causes of neuropathy was negative.  Currently he is on gabapentin 600 mg twice daily and still symptomatic and I have recommended increasing the dose to 600 mg 3 times daily.  He is also on Cymbalta 60 mg daily.    Restless leg syndrome  Patient also has symptoms that raise the possibility of restless leg syndrome.  Initially he could not tolerate even low-dose of Mirapex but afterwards he noticed improvement in his symptoms specially at night and currently is taking 3 mg at bedtime.  I have recommended repeating ferritin and in addition to 3 mg of Mirapex at night, take 0.5 mg in the morning and at noon.    Essential tremor  Patient has essential tremor manifesting predominantly with truncal tremor.  Work-up in the past included normal TSH, copper and ceruloplasmin level.  He does have significant anxiety and at times it appears that his tremors are accentuation of his physiological tremors.  He is quite sensitive to any medication change and at present I am holding off any therapeutic intervention but down the road we can consider trial of Mysoline or Inderal.    Cervical radiculopathy  50-year-old gentleman with history of hypertension, hyperlipidemia, generalized  anxiety disorder, ADHD, Crohn's disease who had multiple vague symptoms with pain radiating from his neck down into his arm.  MRI cervical spine in October 2019 showed mild to moderate degenerative changes and severe right C4-C5 neuroforaminal stenosis.  He was evaluated by neurosurgery and they recommended epidural injection.  He also had a EMG that instead confirmed carpal tunnel syndrome and he subsequently underwent carpal tunnel release surgery.  I have recommended continuing with home exercises.  I offered to refer him for neck traction but he wants to hold off    Bilateral carpal tunnel release  Patient had EMG in the past that was consistent with right carpal tunnel syndrome.  He underwent bilateral carpal tunnel release surgery and is still symptomatic.  I have recommended wearing wrist splints at night and to take vitamin B6.  Due to his anxiety it is somewhat challenging to interpret his symptoms.  Follow-up will be in 2 months    Thank you again for this referral, please feel free to contact me if you have any questions.    Frantz Darling MD  [facility]  (Formerly, [facility]     HISTORY OF PRESENT ILLNESS     Patient is a 50-year-old male with history of hypertension, hyperlipidemia, generalized anxiety disorder, ADHD, Crohn's disease who was evaluated in the past for multiple vague symptoms including paresthesias, neck discomfort, gait difficulty and truncal tremors.  According to patient he had L4-L5 laminectomy in 2008 and since then he developed aches in his legs.  His physician at spine clinic prescribed gabapentin that was helping but in 2019 his symptoms started getting worse and he had epidural injection.  He claims during the procedure he felt electric jolt and afterwards developed spasms in his leg.  He was treated with pain medication and once his symptoms settle down had another epidural injection bilaterally in the lower extremity  that was not as problematic but also did not help his symptoms.  Due to his persistent symptoms MRI of cervical spine and lumbar spine were done that did not show any neuroforaminal stenosis, recurrent disc are high-grade stenosis.  He also developed head tremors along with some slurred speech and did report that after drinking 1 or 2 alcoholic drinks his tremors were improved.  He did report that he feels restless at night and has to get up and walk around to get some relief and has tried different socks and stockings but nothing seems to help.  I suspected he has peripheral neuropathy and he had a EMG that was consistent with mixed sensorimotor polyneuropathy.  Extensive lab work for common causes of neuropathy was negative.  Dose of gabapentin was gradually escalated.  His cervical spine MRI scan showed mild to moderate degenerative changes with mild spinal canal's narrowing and he saw neurosurgery but they recommended epidural injection.  Due to his persistent symptoms he had a EMG that suggested moderate to severe right carpal tunnel syndrome.  He also reported symptoms that raise the possibility of restless leg syndrome and I started him on Mirapex but he could not tolerate even 0.125 mg.  Dose of Mirapex was gradually escalated and currently he is on 3 mg at bedtime.  As noted above he also has essential tremor lab work included a normal TSH, copper and ceruloplasmin level.  As he is quite sensitive to even minor changes I plan on adding Mysoline down the road.    His wife called recently as patient arms or legs are painful and burning.  He had carpal tunnel release and still had paresthesias.  His gabapentin level was increased but I had told him to increase it to 600 mg twice daily.  Since his last visit, patient reports patient reports ongoing difficulty with bilateral hand numbness and tingling that also bothers him in the leg.  He is always moving around.  He is somewhat discouraged that even after the  carpal tunnel release surgery he still has numbness in his both hands.  He continues to report multiple vague symptoms with pain radiating from his neck down into his arms and also at times had shaking     PROBLEM LIST   Patient Active Problem List   Diagnosis Code     Restless legs syndrome G25.81     Idiopathic progressive polyneuropathy G60.3     Hypertension I10     Hyperlipidemia E78.5     Generalized anxiety disorder F41.1     Essential tremor G25.0     Bilateral carpal tunnel syndrome G56.03     Attention deficit disorder (ADD) without hyperactivity F98.8     Anxiety F41.9     Chronic pain disorder G89.4     Chewing tobacco use Z72.0     Displacement of intervertebral disc, site unspecified, without myelopathy [id number]     Diverticulosis of intestine without bleeding K57.90     Esophageal reflux K21.9     Gout M10.9     Internal hemorrhoids K64.8     Morbid obesity (H) E66.01     Neoplasm of testis D49.59     Regional enteritis (H) K50.90         PAST MEDICAL & SURGICAL HISTORY     Past Medical History:   Patient  has a past medical history of Pancreatic cancer (H).    Surgical History:  He  has a past surgical history that includes Laminectomy lumbar two levels (2014) and Release carpal tunnel bilateral.     SOCIAL HISTORY     Reviewed, and he  reports that he has been smoking dip, chew, snus or snuff. His smokeless tobacco use includes chew. He reports current alcohol use.     FAMILY HISTORY     Reviewed, and family history includes Cancer in his maternal grandmother; Cerebrovascular Disease in his maternal grandmother and paternal grandmother; Diabetes in his father; Heart Disease in his paternal grandfather; Hypertension in his father and mother; Thyroid Disease in his mother.     ALLERGIES     No Known Allergies      REVIEW OF SYSTEMS     A 12 point review of system was performed and was negative except as outlined in the history of present illness.     HOME MEDICATIONS       Current Outpatient  Medications:      allopurinol (ZYLOPRIM) 300 MG tablet, TAKE 1 TABLET(300 MG) BY MOUTH TWICE DAILY, Disp: , Rfl:      amphetamine-dextroamphetamine (ADDERALL) 15 MG tablet, Take 15 mg by mouth 2 times daily, Disp: , Rfl:      DULoxetine (CYMBALTA) 60 MG capsule,  Instructions: TK ONE C PO  D, Disp: , Rfl:      gabapentin (NEURONTIN) 300 MG capsule, Take 2 capsules (600 mg) by mouth 2 times daily, Disp: 60 capsule, Rfl: 11     losartan-hydrochlorothiazide (HYZAAR) 100-12.5 MG tablet, Take 1 tablet by mouth, Disp: , Rfl:      omeprazole (PRILOSEC) 20 MG DR capsule, Take 20 mg by mouth, Disp: , Rfl:      pramipexole (MIRAPEX) 1.5 MG tablet, Take 3 mg by mouth At Bedtime, Disp: , Rfl:       PHYSICAL EXAM     Vital signs  /89 (BP Location: Left arm, Patient Position: Sitting)   Pulse 95   Ht 1.829 m (6')   Wt 108.4 kg (239 lb)   BMI 32.41 kg/m      Weight:   239 lbs 0 oz    GENERAL PHYSICAL EXAM: Patient is alert and oriented x 4 in no acute distress. Neck was supple, no carotid bruits, thyromegaly, lymphadenopathy or JVD noted.   NEUROLOGICAL EXAM:  General physical exam reveals a middle-age male who is alert and oriented somewhat anxious and easily distractible. Vital signs were reviewed and are documented in electronic medical record. Neck supple no carotid bruit thyromegaly JVD or lymphadenopathy noted. He has truncal tremors speech is minimally dysarthric no aphasia. Cranial nerves II through XII are intact. Motor strength 5/5. Reflexes 1+ absent at knees and ankles he has a positive Tinel sign. Patient has decreased light touch pinprick vibratory and temperature sensation below the knees bilaterally. Normal gait with ability to walk on toes and heels intact. Romberg negative.       DIAGNOSTIC STUDIES     PERTINENT RADIOLOGY  Following imaging studies were reviewed:     MRI 1/19/20  1. No acute/subacute infarcts, mass lesions, hydrocephalus or MRI evidence of intracranial hemorrhage. No abnormal  intracranial enhancement.  2. Small mucous retention cyst in the left maxillary sinus with mild mucosal thickening. Mild mucosal thickening of the rest of the paranasal sinuses. [1]    C SPINE MRI 10/7/191    . Mild to moderate degenerative changes of the cervical spine, stable to minimally increased compared to previous cervical spine MRI 7/17/2018.  2. Mild spinal canal narrowing at C4-C5.  3. Mild narrowing of the right lateral recess at C3-C4.  4. Moderate to severe right and mild to moderate left neural foraminal narrowing at C4-C5.  5. Moderate right and mild left neural foraminal narrowing at C3-C4 and C6-C7.  6. Interval increase in the edema involving the right-sided C5-C6 facet joints and adjacent soft tissues. These are likely on degenerative basis in the clinical absence of infection. [1]    L SPINE 9/24/19  1. No significant interval change, no evidence of new acute lumbar spine pathology.    EMG 10/23/19  Mixed sensory motor polyneuropathy    2. Right paracentral and foraminal broad-based disc protrusion at L3-L4 contacting, displacing and possibly compressing the right L4 nerve root as well as bilateral neural foraminal stenosis right more than left from degenerative disc change and   degenerative facet change.    3. Status post L4-L5 laminectomy with degenerative spondylosis changes and degenerative facet changes resulting in lateral recess encroachment with possible L5 nerve root contact right more than left.     PERTINENT LABS  Following labs were reviewed:  Lab Results      Sodium Level: 141 [136 mmol/L - 145 mmol/L] (01/10/20 07:48:00)   Potassium Level: 4.1 [3.5 mmol/L - 5 mmol/L] (01/10/20 07:48:00)   Chloride Level: 104 [98 mmol/L - 107 mmol/L] (01/10/20 07:48:00)   CO2 Level: 29 [22 mmol/L - 31 mmol/L] (01/10/20 07:48:00)   AGAP: 8 [5 mmol/L - 18 mmol/L] (01/10/20 07:48:00)   Glucose Level: 94 [70 mg/dL - 125 mg/dL] (01/10/20 07:48:00)   BUN: 11 [8 mg/dL - 22 mg/dL] (01/10/20 07:48:00)    Creatinine Level: 0.92 [0.7 mg/dL - 1.3 mg/dL] (01/10/20 07:48:00)   eGFR: >60 (01/10/20 07:48:00)   eGFR : >60 (01/10/20 07:48:00)   Calcium Level: 9.4 [8.5 mg/dL - 10.5 mg/dL] (01/10/20 07:48:00)   Bilirubin Total: 0.9 [0 mg/dL - 1 mg/dL] (01/10/20 07:48:00)   Alkaline Phosphatase: 68 [45 U/L - 120 U/L] (01/10/20 07:48:00)   AST/SGOT: 22 [0 U/L - 40 U/L] (01/10/20 07:48:00)   ALT/SGPT: 18 [0 U/L - 45 U/L] (01/10/20 07:48:00)   Protein Total: 6.9 [6 gm/dL - 8 gm/dL] (01/10/20 07:48:00)   Protein Total: 7.0 [6 gm/dL - 8 gm/dL] (01/10/20 07:48:00)   Albumin Level: 4.3 [3.5 gm/dL - 5 gm/dL] (01/10/20 07:48:00)   Folate: 11.6 (01/10/20 07:48:00)   Copper Serum: 82.4 [70 ug/dL - 140 ug/dL] (01/10/20 07:48:00)   Vitamin B1 (Thiamine): 85 [70 - 180] (01/10/20 07:48:00)   Vitamin B12 Level: 397 [213 pg/mL - 816 pg/mL] (01/10/20 07:48:00)   Vitamin E (a-Tocopherol): 8.8 [5.5 mg/L - 18 mg/L] (01/10/20 07:48:00)   Vitamin E (g-Tocopherol): 1.2 [0 mg/L - 6 mg/L] (01/10/20 07:48:00)   Immunofixation: Unremarkable immunofixation electrophoresis. (01/10/20 07:48:00)   Albumin-Electrophoresis: 4.6 [3.2 gm/dL - 4.7 gm/dL] (01/10/20 07:48:00)   Albumin-Electrophoresis%: 66.2 [51 % - 67 %] (01/10/20 07:48:00)   Alpha 1 Globulin: 0.2 [0.1 gm/dL - 0.3 gm/dL] (01/10/20 07:48:00)   Alpha 1%: 2.7 [2 % - 4 %] (01/10/20 07:48:00)   Alpha 2 Globulin: 0.6 [0.4 gm/dL - 0.9 gm/dL] (01/10/20 07:48:00)   Alpha 2%: 9.3 [5 % - 13 %] (01/10/20 07:48:00)   Beta Globulin Total: 0.7 [0.7 gm/dL - 1.2 gm/dL] (01/10/20 07:48:00)   Beta Total %: 10.7 [10 % - 17 %] (01/10/20 07:48:00)   Gamma Globulin: 0.8 [0.6 gm/dL - 1.4 gm/dL] (01/10/20 07:48:00)   Gamma%: 11.1 [9 % - 20 %] (01/10/20 07:48:00)   Electrophoresis Comment: Unremarkable protein electrophoresis. (01/10/20 07:48:00)   T4 Free: 0.9 [0.7 ng/dL - 1.8 ng/dL] (01/10/20 07:48:00)   TSH Cascade: 0.28 Low [0.3 uIU/mL - 5 uIU/mL] (01/10/20 07:48:00)   Iron Level: 153 [42 ug/dL - 175  ug/dL] (01/10/20 07:48:00)   Ferritin: 209 [27 ng/mL - 300 ng/mL] (01/10/20 07:48:00)   Transferrin: 259 [212 mg/dL - 360 mg/dL] (01/10/20 07:48:00)   Transferrin IBC: 323 [313 ug/dL - 563 ug/dL] (01/10/20 07:48:00)   Transferrin Saturation: 47 [20 % - 50 %] (01/10/20 07:48:00)   Angiotensin Converting Enzyme (ACE): 24 [9 U/L - 67 U/L] (01/10/20 07:48:00)   Ceruloplasmin: 11 Low [20 mg/dL - 60 mg/dL] (01/10/20 07:48:00)   Methylmalonic Acid: 0.17 [0 umol/L - 0.4 umol/L] (01/10/20 07:48:00)   NAILA Cascade: 0.1 (01/10/20 07:48:00)   SSA (Ro): 1 (01/10/20 07:48:00)   SSB (La): 0 (01/10/20 07:48:00)   Rheumatoid Factor Qt: <15.0 [0 IU/mL - 30 IU/mL] (01/10/20 07:48:00)   GM1 Ab IgG: Negative (01/10/20 07:48:00)   GM1 Ab IgM: Negative (01/10/20 07:48:00)   Asialo GM1 Ab IgG: Negative (01/10/20 07:48:00)   Asialo GM1 Ab IgM: Negative (01/10/20 07:48:00)   Disialo GD1b Ab IgG: Negative (01/10/20 07:48:00)   Disialo GD1b Ab IgM: Negative (01/10/20 07:48:00)   Lyme Ab: 0.04 (01/10/20 07:48:00)   Misc [facility] Test Result: SEE NOTE (01/10/20 07:30:00)   Misc Test Name: Vascular Endothelial Growth Factor (01/10/20 07:30:00)   Misc Test Info: Chemistry Reference Test (01/10/20 07:30:00)   Misc Test Result: Manpreet Pollard MD (01/10/20 07:48:00)   Misc Test Result: G62.89 (01/10/20 07:48:00)   Misc Test Result: Manpreet Pollard MD (01/10/20 07:48:00)   Misc Test Result: G62.89 (01/10/20 07:48:00)   Misc Test Ref Lab: See Note (01/10/20 07:30:00)            Total time spent for face to face visit, reviewing labs/imaging studies, counseling and coordination of care was: 30 Minutes More than 50% of this time was spent on counseling and coordination of care.      This note was dictated using voice recognition software.  Any grammatical or context distortions are unintentional and inherent to the software.

## 2020-09-04 NOTE — NURSING NOTE
Chief Complaint   Patient presents with     Numbness     BUE/BLE numbness and tingling- Taking gabapentin 600mg BID      Meenu Coreas CMA on 9/4/2020 at 9:16 AM

## 2020-09-04 NOTE — LETTER
2020         RE: Rex Bond  [address] [facility] [address]        Dear Colleague,    Thank you for referring your patient, Rex Bond, to the [facility] [place]. Please see a copy of my visit note below.    NEUROLOGY FOLLOW UP VISIT  NOTE       [facility] [place]  [address] [place] [address]  Tel: [phone]  Fax: [phone]  [email or web address]     Rex BondPEYTON 1970, MRN [id number]  PCP: Anisa Bush, [phone]  Date: 2020     ASSESSMENT & PLAN     Diagnosis code: Idiopathic progressive polyneuropathy                               Essential tremor                               Restless leg syndrome     Idiopathic progressive polyneuropathy  50-year-old male with a history of hypertension, hyperlipidemia, ADHD, generalized anxiety disorder and Crohn's disease who was evaluated for multiple vague symptoms and was diagnosed with idiopathic progressive polyneuropathy.  Extensive lab work for common causes of neuropathy was negative.  Currently he is on gabapentin 600 mg twice daily and still symptomatic and I have recommended increasing the dose to 600 mg 3 times daily.  He is also on Cymbalta 60 mg daily.    Restless leg syndrome  Patient also has symptoms that raise the possibility of restless leg syndrome.  Initially he could not tolerate even low-dose of Mirapex but afterwards he noticed improvement in his symptoms specially at night and currently is taking 3 mg at bedtime.  I have recommended repeating ferritin and in addition to 3 mg of Mirapex at night, take 0.5 mg in the morning and at noon.    Essential tremor  Patient has essential tremor manifesting predominantly with truncal tremor.  Work-up in the past included normal TSH, copper and ceruloplasmin level.  He does have significant anxiety and at times it appears that his tremors are accentuation of his physiological tremors.  He is quite sensitive to any  medication change and at present I am holding off any therapeutic intervention but down the road we can consider trial of Mysoline or Inderal.    Cervical radiculopathy  50-year-old gentleman with history of hypertension, hyperlipidemia, generalized anxiety disorder, ADHD, Crohn's disease who had multiple vague symptoms with pain radiating from his neck down into his arm.  MRI cervical spine in October 2019 showed mild to moderate degenerative changes and severe right C4-C5 neuroforaminal stenosis.  He was evaluated by neurosurgery and they recommended epidural injection.  He also had a EMG that instead confirmed carpal tunnel syndrome and he subsequently underwent carpal tunnel release surgery.  I have recommended continuing with home exercises.  I offered to refer him for neck traction but he wants to hold off    Bilateral carpal tunnel release  Patient had EMG in the past that was consistent with right carpal tunnel syndrome.  He underwent bilateral carpal tunnel release surgery and is still symptomatic.  I have recommended wearing wrist splints at night and to take vitamin B6.  Due to his anxiety it is somewhat challenging to interpret his symptoms.  Follow-up will be in 2 months    Thank you again for this referral, please feel free to contact me if you have any questions.    Frantz Darling MD  [facility]  (Former[facility], [facility]     HISTORY OF PRESENT ILLNESS     Patient is a 50-year-old male with history of hypertension, hyperlipidemia, generalized anxiety disorder, ADHD, Crohn's disease who was evaluated in the past for multiple vague symptoms including paresthesias, neck discomfort, gait difficulty and truncal tremors.  According to patient he had L4-L5 laminectomy in 2008 and since then he developed aches in his legs.  His physician at spine clinic prescribed gabapentin that was helping but in 2019 his symptoms started getting worse and he had  epidural injection.  He claims during the procedure he felt electric jolt and afterwards developed spasms in his leg.  He was treated with pain medication and once his symptoms settle down had another epidural injection bilaterally in the lower extremity that was not as problematic but also did not help his symptoms.  Due to his persistent symptoms MRI of cervical spine and lumbar spine were done that did not show any neuroforaminal stenosis, recurrent disc are high-grade stenosis.  He also developed head tremors along with some slurred speech and did report that after drinking 1 or 2 alcoholic drinks his tremors were improved.  He did report that he feels restless at night and has to get up and walk around to get some relief and has tried different socks and stockings but nothing seems to help.  I suspected he has peripheral neuropathy and he had a EMG that was consistent with mixed sensorimotor polyneuropathy.  Extensive lab work for common causes of neuropathy was negative.  Dose of gabapentin was gradually escalated.  His cervical spine MRI scan showed mild to moderate degenerative changes with mild spinal canal's narrowing and he saw neurosurgery but they recommended epidural injection.  Due to his persistent symptoms he had a EMG that suggested moderate to severe right carpal tunnel syndrome.  He also reported symptoms that raise the possibility of restless leg syndrome and I started him on Mirapex but he could not tolerate even 0.125 mg.  Dose of Mirapex was gradually escalated and currently he is on 3 mg at bedtime.  As noted above he also has essential tremor lab work included a normal TSH, copper and ceruloplasmin level.  As he is quite sensitive to even minor changes I plan on adding Mysoline down the road.    His wife called recently as patient arms or legs are painful and burning.  He had carpal tunnel release and still had paresthesias.  His gabapentin level was increased but I had told him to increase  it to 600 mg twice daily.  Since his last visit, patient reports patient reports ongoing difficulty with bilateral hand numbness and tingling that also bothers him in the leg.  He is always moving around.  He is somewhat discouraged that even after the carpal tunnel release surgery he still has numbness in his both hands.  He continues to report multiple vague symptoms with pain radiating from his neck down into his arms and also at times had shaking     PROBLEM LIST   Patient Active Problem List   Diagnosis Code     Restless legs syndrome G25.81     Idiopathic progressive polyneuropathy G60.3     Hypertension I10     Hyperlipidemia E78.5     Generalized anxiety disorder F41.1     Essential tremor G25.0     Bilateral carpal tunnel syndrome G56.03     Attention deficit disorder (ADD) without hyperactivity F98.8     Anxiety F41.9     Chronic pain disorder G89.4     Chewing tobacco use Z72.0     Displacement of intervertebral disc, site unspecified, without myelopathy [id number]     Diverticulosis of intestine without bleeding K57.90     Esophageal reflux K21.9     Gout M10.9     Internal hemorrhoids K64.8     Morbid obesity (H) E66.01     Neoplasm of testis D49.59     Regional enteritis (H) K50.90         PAST MEDICAL & SURGICAL HISTORY     Past Medical History:   Patient  has a past medical history of Pancreatic cancer (H).    Surgical History:  He  has a past surgical history that includes Laminectomy lumbar two levels (2014) and Release carpal tunnel bilateral.     SOCIAL HISTORY     Reviewed, and he  reports that he has been smoking dip, chew, snus or snuff. His smokeless tobacco use includes chew. He reports current alcohol use.     FAMILY HISTORY     Reviewed, and family history includes Cancer in his maternal grandmother; Cerebrovascular Disease in his maternal grandmother and paternal grandmother; Diabetes in his father; Heart Disease in his paternal grandfather; Hypertension in his father and mother; Thyroid  Disease in his mother.     ALLERGIES     No Known Allergies      REVIEW OF SYSTEMS     A 12 point review of system was performed and was negative except as outlined in the history of present illness.     HOME MEDICATIONS       Current Outpatient Medications:      allopurinol (ZYLOPRIM) 300 MG tablet, TAKE 1 TABLET(300 MG) BY MOUTH TWICE DAILY, Disp: , Rfl:      amphetamine-dextroamphetamine (ADDERALL) 15 MG tablet, Take 15 mg by mouth 2 times daily, Disp: , Rfl:      DULoxetine (CYMBALTA) 60 MG capsule,  Instructions: TK ONE C PO  D, Disp: , Rfl:      gabapentin (NEURONTIN) 300 MG capsule, Take 2 capsules (600 mg) by mouth 2 times daily, Disp: 60 capsule, Rfl: 11     losartan-hydrochlorothiazide (HYZAAR) 100-12.5 MG tablet, Take 1 tablet by mouth, Disp: , Rfl:      omeprazole (PRILOSEC) 20 MG DR capsule, Take 20 mg by mouth, Disp: , Rfl:      pramipexole (MIRAPEX) 1.5 MG tablet, Take 3 mg by mouth At Bedtime, Disp: , Rfl:       PHYSICAL EXAM     Vital signs  /89 (BP Location: Left arm, Patient Position: Sitting)   Pulse 95   Ht 1.829 m (6')   Wt 108.4 kg (239 lb)   BMI 32.41 kg/m      Weight:   239 lbs 0 oz    GENERAL PHYSICAL EXAM: Patient is alert and oriented x 4 in no acute distress. Neck was supple, no carotid bruits, thyromegaly, lymphadenopathy or JVD noted.   NEUROLOGICAL EXAM:  General physical exam reveals a middle-age male who is alert and oriented somewhat anxious and easily distractible. Vital signs were reviewed and are documented in electronic medical record. Neck supple no carotid bruit thyromegaly JVD or lymphadenopathy noted. He has truncal tremors speech is minimally dysarthric no aphasia. Cranial nerves II through XII are intact. Motor strength 5/5. Reflexes 1+ absent at knees and ankles he has a positive Tinel sign. Patient has decreased light touch pinprick vibratory and temperature sensation below the knees bilaterally. Normal gait with ability to walk on toes and heels intact.  Romberg negative.       DIAGNOSTIC STUDIES     PERTINENT RADIOLOGY  Following imaging studies were reviewed:     MRI 1/19/20  1. No acute/subacute infarcts, mass lesions, hydrocephalus or MRI evidence of intracranial hemorrhage. No abnormal intracranial enhancement.  2. Small mucous retention cyst in the left maxillary sinus with mild mucosal thickening. Mild mucosal thickening of the rest of the paranasal sinuses. [1]    C SPINE MRI 10/7/191    . Mild to moderate degenerative changes of the cervical spine, stable to minimally increased compared to previous cervical spine MRI 7/17/2018.  2. Mild spinal canal narrowing at C4-C5.  3. Mild narrowing of the right lateral recess at C3-C4.  4. Moderate to severe right and mild to moderate left neural foraminal narrowing at C4-C5.  5. Moderate right and mild left neural foraminal narrowing at C3-C4 and C6-C7.  6. Interval increase in the edema involving the right-sided C5-C6 facet joints and adjacent soft tissues. These are likely on degenerative basis in the clinical absence of infection. [1]    L SPINE 9/24/19  1. No significant interval change, no evidence of new acute lumbar spine pathology.    EMG 10/23/19  Mixed sensory motor polyneuropathy    2. Right paracentral and foraminal broad-based disc protrusion at L3-L4 contacting, displacing and possibly compressing the right L4 nerve root as well as bilateral neural foraminal stenosis right more than left from degenerative disc change and   degenerative facet change.    3. Status post L4-L5 laminectomy with degenerative spondylosis changes and degenerative facet changes resulting in lateral recess encroachment with possible L5 nerve root contact right more than left.     PERTINENT LABS  Following labs were reviewed:  Lab Results      Sodium Level: 141 [136 mmol/L - 145 mmol/L] (01/10/20 07:48:00)   Potassium Level: 4.1 [3.5 mmol/L - 5 mmol/L] (01/10/20 07:48:00)   Chloride Level: 104 [98 mmol/L - 107 mmol/L] (01/10/20  07:48:00)   CO2 Level: 29 [22 mmol/L - 31 mmol/L] (01/10/20 07:48:00)   AGAP: 8 [5 mmol/L - 18 mmol/L] (01/10/20 07:48:00)   Glucose Level: 94 [70 mg/dL - 125 mg/dL] (01/10/20 07:48:00)   BUN: 11 [8 mg/dL - 22 mg/dL] (01/10/20 07:48:00)   Creatinine Level: 0.92 [0.7 mg/dL - 1.3 mg/dL] (01/10/20 07:48:00)   eGFR: >60 (01/10/20 07:48:00)   eGFR : >60 (01/10/20 07:48:00)   Calcium Level: 9.4 [8.5 mg/dL - 10.5 mg/dL] (01/10/20 07:48:00)   Bilirubin Total: 0.9 [0 mg/dL - 1 mg/dL] (01/10/20 07:48:00)   Alkaline Phosphatase: 68 [45 U/L - 120 U/L] (01/10/20 07:48:00)   AST/SGOT: 22 [0 U/L - 40 U/L] (01/10/20 07:48:00)   ALT/SGPT: 18 [0 U/L - 45 U/L] (01/10/20 07:48:00)   Protein Total: 6.9 [6 gm/dL - 8 gm/dL] (01/10/20 07:48:00)   Protein Total: 7.0 [6 gm/dL - 8 gm/dL] (01/10/20 07:48:00)   Albumin Level: 4.3 [3.5 gm/dL - 5 gm/dL] (01/10/20 07:48:00)   Folate: 11.6 (01/10/20 07:48:00)   Copper Serum: 82.4 [70 ug/dL - 140 ug/dL] (01/10/20 07:48:00)   Vitamin B1 (Thiamine): 85 [70 - 180] (01/10/20 07:48:00)   Vitamin B12 Level: 397 [213 pg/mL - 816 pg/mL] (01/10/20 07:48:00)   Vitamin E (a-Tocopherol): 8.8 [5.5 mg/L - 18 mg/L] (01/10/20 07:48:00)   Vitamin E (g-Tocopherol): 1.2 [0 mg/L - 6 mg/L] (01/10/20 07:48:00)   Immunofixation: Unremarkable immunofixation electrophoresis. (01/10/20 07:48:00)   Albumin-Electrophoresis: 4.6 [3.2 gm/dL - 4.7 gm/dL] (01/10/20 07:48:00)   Albumin-Electrophoresis%: 66.2 [51 % - 67 %] (01/10/20 07:48:00)   Alpha 1 Globulin: 0.2 [0.1 gm/dL - 0.3 gm/dL] (01/10/20 07:48:00)   Alpha 1%: 2.7 [2 % - 4 %] (01/10/20 07:48:00)   Alpha 2 Globulin: 0.6 [0.4 gm/dL - 0.9 gm/dL] (01/10/20 07:48:00)   Alpha 2%: 9.3 [5 % - 13 %] (01/10/20 07:48:00)   Beta Globulin Total: 0.7 [0.7 gm/dL - 1.2 gm/dL] (01/10/20 07:48:00)   Beta Total %: 10.7 [10 % - 17 %] (01/10/20 07:48:00)   Gamma Globulin: 0.8 [0.6 gm/dL - 1.4 gm/dL] (01/10/20 07:48:00)   Gamma%: 11.1 [9 % - 20 %] (01/10/20 07:48:00)    Electrophoresis Comment: Unremarkable protein electrophoresis. (01/10/20 07:48:00)   T4 Free: 0.9 [0.7 ng/dL - 1.8 ng/dL] (01/10/20 07:48:00)   TSH Cascade: 0.28 Low [0.3 uIU/mL - 5 uIU/mL] (01/10/20 07:48:00)   Iron Level: 153 [42 ug/dL - 175 ug/dL] (01/10/20 07:48:00)   Ferritin: 209 [27 ng/mL - 300 ng/mL] (01/10/20 07:48:00)   Transferrin: 259 [212 mg/dL - 360 mg/dL] (01/10/20 07:48:00)   Transferrin IBC: 323 [313 ug/dL - 563 ug/dL] (01/10/20 07:48:00)   Transferrin Saturation: 47 [20 % - 50 %] (01/10/20 07:48:00)   Angiotensin Converting Enzyme (ACE): 24 [9 U/L - 67 U/L] (01/10/20 07:48:00)   Ceruloplasmin: 11 Low [20 mg/dL - 60 mg/dL] (01/10/20 07:48:00)   Methylmalonic Acid: 0.17 [0 umol/L - 0.4 umol/L] (01/10/20 07:48:00)   NAILA Cascade: 0.1 (01/10/20 07:48:00)   SSA (Ro): 1 (01/10/20 07:48:00)   SSB (La): 0 (01/10/20 07:48:00)   Rheumatoid Factor Qt: <15.0 [0 IU/mL - 30 IU/mL] (01/10/20 07:48:00)   GM1 Ab IgG: Negative (01/10/20 07:48:00)   GM1 Ab IgM: Negative (01/10/20 07:48:00)   Asialo GM1 Ab IgG: Negative (01/10/20 07:48:00)   Asialo GM1 Ab IgM: Negative (01/10/20 07:48:00)   Disialo GD1b Ab IgG: Negative (01/10/20 07:48:00)   Disialo GD1b Ab IgM: Negative (01/10/20 07:48:00)   Lyme Ab: 0.04 (01/10/20 07:48:00)   Misc ARUP Test Result: SEE NOTE (01/10/20 07:30:00)   Misc Test Name: Vascular Endothelial Growth Factor (01/10/20 07:30:00)   Misc Test Info: Chemistry Reference Test (01/10/20 07:30:00)   Misc Test Result: Manpreet Pollard MD (01/10/20 07:48:00)   Misc Test Result: G62.89 (01/10/20 07:48:00)   Misc Test Result: Manpreet Pollard MD (01/10/20 07:48:00)   Misc Test Result: G62.89 (01/10/20 07:48:00)   [facility] Test Ref Lab: See Note (01/10/20 07:30:00)            Total time spent for face to face visit, reviewing labs/imaging studies, counseling and coordination of care was: 30 Minutes More than 50% of this time was spent on counseling and coordination of care.      This note was dictated  using voice recognition software.  Any grammatical or context distortions are unintentional and inherent to the software.           Again, thank you for allowing me to participate in the care of your patient.        Sincerely,        Frantz Darling MD

## 2020-09-15 ENCOUNTER — MYC MEDICAL ADVICE (OUTPATIENT)
Dept: NEUROLOGY | Facility: CLINIC | Age: 50
End: 2020-09-15

## 2020-09-22 ENCOUNTER — OFFICE VISIT - HEALTHEAST (OUTPATIENT)
Dept: FAMILY MEDICINE | Facility: CLINIC | Age: 50
End: 2020-09-22

## 2020-09-22 ENCOUNTER — COMMUNICATION - HEALTHEAST (OUTPATIENT)
Dept: SCHEDULING | Facility: CLINIC | Age: 50
End: 2020-09-22

## 2020-09-22 DIAGNOSIS — S20.212A RIB CONTUSION, LEFT, INITIAL ENCOUNTER: ICD-10-CM

## 2020-09-22 DIAGNOSIS — G89.29 CHRONIC BILATERAL LOW BACK PAIN WITH LEFT-SIDED SCIATICA: ICD-10-CM

## 2020-09-22 DIAGNOSIS — M54.42 CHRONIC BILATERAL LOW BACK PAIN WITH LEFT-SIDED SCIATICA: ICD-10-CM
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
## 2020-09-27 ENCOUNTER — HOSPITAL ENCOUNTER (OUTPATIENT)
Dept: MRI IMAGING | Facility: CLINIC | Age: 50
Discharge: HOME | End: 2020-09-27
Attending: FAMILY MEDICINE

## 2020-09-27 DIAGNOSIS — M54.42 CHRONIC BILATERAL LOW BACK PAIN WITH LEFT-SIDED SCIATICA: ICD-10-CM

## 2020-09-27 DIAGNOSIS — G89.29 CHRONIC BILATERAL LOW BACK PAIN WITH LEFT-SIDED SCIATICA: ICD-10-CM

## 2020-09-29 ENCOUNTER — COMMUNICATION - HEALTHEAST (OUTPATIENT)
Dept: FAMILY MEDICINE | Facility: CLINIC | Age: 50
End: 2020-09-29

## 2020-09-29 DIAGNOSIS — F98.8 ATTENTION DEFICIT DISORDER (ADD) WITHOUT HYPERACTIVITY: ICD-10-CM

## 2020-10-01 ENCOUNTER — COMMUNICATION - HEALTHEAST (OUTPATIENT)
Dept: FAMILY MEDICINE | Facility: CLINIC | Age: 50
End: 2020-10-01

## 2020-10-01 DIAGNOSIS — F98.8 ATTENTION DEFICIT DISORDER (ADD) WITHOUT HYPERACTIVITY: ICD-10-CM

## 2020-10-02 ENCOUNTER — COMMUNICATION - HEALTHEAST (OUTPATIENT)
Dept: FAMILY MEDICINE | Facility: CLINIC | Age: 50
End: 2020-10-02

## 2020-10-05 ENCOUNTER — COMMUNICATION - HEALTHEAST (OUTPATIENT)
Dept: FAMILY MEDICINE | Facility: CLINIC | Age: 50
End: 2020-10-05

## 2020-10-06 ENCOUNTER — AMBULATORY - HEALTHEAST (OUTPATIENT)
Dept: FAMILY MEDICINE | Facility: CLINIC | Age: 50
End: 2020-10-06

## 2020-10-06 ENCOUNTER — COMMUNICATION - HEALTHEAST (OUTPATIENT)
Dept: FAMILY MEDICINE | Facility: CLINIC | Age: 50
End: 2020-10-06

## 2020-10-06 DIAGNOSIS — M54.16 LUMBAR RADICULOPATHY: ICD-10-CM

## 2020-10-07 ENCOUNTER — COMMUNICATION - HEALTHEAST (OUTPATIENT)
Dept: NEUROSURGERY | Facility: CLINIC | Age: 50
End: 2020-10-07

## 2020-10-13 ENCOUNTER — OFFICE VISIT - HEALTHEAST (OUTPATIENT)
Dept: FAMILY MEDICINE | Facility: CLINIC | Age: 50
End: 2020-10-13

## 2020-10-13 DIAGNOSIS — J01.00 ACUTE NON-RECURRENT MAXILLARY SINUSITIS: ICD-10-CM

## 2020-10-13 ASSESSMENT — ANXIETY QUESTIONNAIRES
4. TROUBLE RELAXING: NOT AT ALL
GAD7 TOTAL SCORE: 0
IF YOU CHECKED OFF ANY PROBLEMS ON THIS QUESTIONNAIRE, HOW DIFFICULT HAVE THESE PROBLEMS MADE IT FOR YOU TO DO YOUR WORK, TAKE CARE OF THINGS AT HOME, OR GET ALONG WITH OTHER PEOPLE: NOT DIFFICULT AT ALL
2. NOT BEING ABLE TO STOP OR CONTROL WORRYING: NOT AT ALL
3. WORRYING TOO MUCH ABOUT DIFFERENT THINGS: NOT AT ALL
6. BECOMING EASILY ANNOYED OR IRRITABLE: NOT AT ALL
7. FEELING AFRAID AS IF SOMETHING AWFUL MIGHT HAPPEN: NOT AT ALL
5. BEING SO RESTLESS THAT IT IS HARD TO SIT STILL: NOT AT ALL
1. FEELING NERVOUS, ANXIOUS, OR ON EDGE: NOT AT ALL

## 2020-10-13 ASSESSMENT — PATIENT HEALTH QUESTIONNAIRE - PHQ9: SUM OF ALL RESPONSES TO PHQ QUESTIONS 1-9: 3

## 2020-10-14 ENCOUNTER — COMMUNICATION - HEALTHEAST (OUTPATIENT)
Dept: FAMILY MEDICINE | Facility: CLINIC | Age: 50
End: 2020-10-14

## 2020-10-16 ENCOUNTER — OFFICE VISIT - HEALTHEAST (OUTPATIENT)
Dept: FAMILY MEDICINE | Facility: CLINIC | Age: 50
End: 2020-10-16

## 2020-10-16 DIAGNOSIS — R25.1 TREMOR: ICD-10-CM

## 2020-10-16 DIAGNOSIS — F98.8 ATTENTION DEFICIT DISORDER (ADD) WITHOUT HYPERACTIVITY: ICD-10-CM

## 2020-10-16 DIAGNOSIS — I10 ESSENTIAL HYPERTENSION: ICD-10-CM

## 2020-10-20 ENCOUNTER — MYC MEDICAL ADVICE (OUTPATIENT)
Dept: NEUROLOGY | Facility: CLINIC | Age: 50
End: 2020-10-20

## 2020-10-21 NOTE — TELEPHONE ENCOUNTER
If he is still symptomatic, we can consider increasing Mirapex to 1 mg in the morning, 1 mg at noon and 3 mg at bedtime.  No change in the dose of gabapentin

## 2020-10-22 ENCOUNTER — COMMUNICATION - HEALTHEAST (OUTPATIENT)
Dept: FAMILY MEDICINE | Facility: CLINIC | Age: 50
End: 2020-10-22

## 2020-10-22 DIAGNOSIS — K21.9 ESOPHAGEAL REFLUX: ICD-10-CM

## 2020-10-23 ENCOUNTER — TELEPHONE (OUTPATIENT)
Dept: NEUROLOGY | Facility: CLINIC | Age: 50
End: 2020-10-23

## 2020-10-23 NOTE — TELEPHONE ENCOUNTER
Hi Doctors,  Pt is scheduled with  on 11/2/20. However,Pt's wife is requesting pt to see another provider for 2nd opinion. Who can I schedule with? Please advise?

## 2020-10-28 NOTE — TELEPHONE ENCOUNTER
RECORDS RECEIVED FROM: Self (2nd opinion)   Date of Appt: 2/3/21   NOTES (FOR ALL VISITS) STATUS DETAILS   OFFICE NOTE from referring provider N/A    OFFICE NOTE from other specialist Received Dr Darling @ [facility] [place]:  9/4/20 1/9/20    Dr Mellissa Sifuentes @ [facility]:  1/23/20  11/12/19  10/10/19  10/3/19    Anisa Bush NP @ [facility] [place]:  5/9/18   DISCHARGE SUMMARY from hospital N/A    DISCHARGE REPORT from the ER N/A    OPERATIVE REPORT N/A    MEDICATION LIST Care Everywhere    IMAGING  (FOR ALL VISITS)     EMG Received 10/23/19   EEG N/A    LUMBAR PUNCTURE N/A    RENATA SCAN N/A    DEXA SCAN *NEUROSURGERY* N/A    ULTRASOUND (CAROTID BILAT) *VASCULAR* N/A    MRI (HEAD, NECK, SPINE) Received [facility]:  MRI Lumbar Spine 9/27/20  MRI Brain 1/19/20  MRI Cervical Spine 10/7/19  MRI Lumbar Spine 9/24/19  MRI Lumbar Spine 5/17/18  MRI Cervical Spine 5/17/18   XRAY (SPINE) *NEUROSURGERY* N/A    CT (HEAD, NECK, SPINE) N/A       Action 10/28/20 MV 8.10am   Action Taken Imaging request faxed to [facility] for:  MRI Lumbar Spine 9/27/20  MRI Brain 1/19/20  MRI Cervical Spine 10/7/19  MRI Lumbar Spine 9/24/19  MRI Lumbar Spine 5/17/18  MRI Cervical Spine 5/17/18    Additional records request faxed to [facility], along with EMG report request     Imaging Received  10/29/20 MV 11.25am     Image Type (x): Disc:   PACS: x   Exam Date/Name MRI Lumbar Spine 9/27/20  MRI Brain 1/19/20  MRI Cervical Spine 10/7/19  MRI Lumbar Spine 9/24/19  MRI Lumbar Spine 5/17/18  MRI Cervical Spine 5/17/18 Comments: images resolved in PACS       Action 11/19/20 MV 1.27pm   Action Taken 2nd request faxed to  for records and EMG report     Action 12/1/20 MV 11.55am   Action Taken 3rd request faxed to      Action 12/2/20 MV 12.46pm   Action Taken EMG and records received from . Sent to scanning.

## 2020-11-04 ENCOUNTER — COMMUNICATION - HEALTHEAST (OUTPATIENT)
Dept: FAMILY MEDICINE | Facility: CLINIC | Age: 50
End: 2020-11-04

## 2020-11-04 DIAGNOSIS — F98.8 ATTENTION DEFICIT DISORDER (ADD) WITHOUT HYPERACTIVITY: ICD-10-CM

## 2020-11-05 ENCOUNTER — COMMUNICATION - HEALTHEAST (OUTPATIENT)
Dept: FAMILY MEDICINE | Facility: CLINIC | Age: 50
End: 2020-11-05

## 2020-12-06 ENCOUNTER — COMMUNICATION - HEALTHEAST (OUTPATIENT)
Dept: FAMILY MEDICINE | Facility: CLINIC | Age: 50
End: 2020-12-06

## 2020-12-06 DIAGNOSIS — F98.8 ATTENTION DEFICIT DISORDER (ADD) WITHOUT HYPERACTIVITY: ICD-10-CM

## 2020-12-08 ENCOUNTER — COMMUNICATION - HEALTHEAST (OUTPATIENT)
Dept: SCHEDULING | Facility: CLINIC | Age: 50
End: 2020-12-08

## 2020-12-08 ENCOUNTER — OFFICE VISIT - HEALTHEAST (OUTPATIENT)
Dept: FAMILY MEDICINE | Facility: CLINIC | Age: 50
End: 2020-12-08

## 2020-12-08 DIAGNOSIS — H53.9 VISUAL DISTURBANCE: ICD-10-CM

## 2020-12-09 ENCOUNTER — COMMUNICATION - HEALTHEAST (OUTPATIENT)
Dept: FAMILY MEDICINE | Facility: CLINIC | Age: 50
End: 2020-12-09

## 2020-12-11 ENCOUNTER — AMBULATORY - HEALTHEAST (OUTPATIENT)
Dept: FAMILY MEDICINE | Facility: CLINIC | Age: 50
End: 2020-12-11

## 2020-12-11 DIAGNOSIS — Z79.899 MEDICATION MANAGEMENT: ICD-10-CM

## 2020-12-23 ENCOUNTER — COMMUNICATION - HEALTHEAST (OUTPATIENT)
Dept: FAMILY MEDICINE | Facility: CLINIC | Age: 50
End: 2020-12-23

## 2020-12-23 DIAGNOSIS — I10 ESSENTIAL HYPERTENSION: ICD-10-CM

## 2021-01-04 ENCOUNTER — HEALTH MAINTENANCE LETTER (OUTPATIENT)
Age: 51
End: 2021-01-04

## 2021-01-11 ENCOUNTER — COMMUNICATION - HEALTHEAST (OUTPATIENT)
Dept: FAMILY MEDICINE | Facility: CLINIC | Age: 51
End: 2021-01-11

## 2021-01-11 DIAGNOSIS — F98.8 ATTENTION DEFICIT DISORDER (ADD) WITHOUT HYPERACTIVITY: ICD-10-CM

## 2021-01-31 ASSESSMENT — ENCOUNTER SYMPTOMS
POLYDIPSIA: 0
STIFFNESS: 1
SPEECH CHANGE: 0
MUSCLE CRAMPS: 1
MUSCLE WEAKNESS: 1
HEADACHES: 1
WEIGHT LOSS: 0
TREMORS: 1
NECK PAIN: 1
NIGHT SWEATS: 1
DECREASED APPETITE: 0
TINGLING: 1
HALLUCINATIONS: 0
BACK PAIN: 1
CHILLS: 0
MYALGIAS: 1
MEMORY LOSS: 0
NUMBNESS: 1
LOSS OF CONSCIOUSNESS: 0
POLYPHAGIA: 0
WEAKNESS: 1
FEVER: 0
DIZZINESS: 1
WEIGHT GAIN: 0
FATIGUE: 1
PARALYSIS: 0
INCREASED ENERGY: 1
ALTERED TEMPERATURE REGULATION: 0
DISTURBANCES IN COORDINATION: 1
ARTHRALGIAS: 1
JOINT SWELLING: 1
SEIZURES: 0

## 2021-02-03 ENCOUNTER — OFFICE VISIT (OUTPATIENT)
Dept: NEUROLOGY | Facility: CLINIC | Age: 51
End: 2021-02-03
Payer: COMMERCIAL

## 2021-02-03 ENCOUNTER — PRE VISIT (OUTPATIENT)
Dept: NEUROLOGY | Facility: CLINIC | Age: 51
End: 2021-02-03

## 2021-02-03 ENCOUNTER — DOCUMENTATION ONLY (OUTPATIENT)
Dept: CARE COORDINATION | Facility: CLINIC | Age: 51
End: 2021-02-03

## 2021-02-03 VITALS — HEART RATE: 107 BPM | OXYGEN SATURATION: 97 % | SYSTOLIC BLOOD PRESSURE: 144 MMHG | DIASTOLIC BLOOD PRESSURE: 103 MMHG

## 2021-02-03 DIAGNOSIS — G62.9 SMALL FIBER NEUROPATHY: ICD-10-CM

## 2021-02-03 DIAGNOSIS — M54.16 LUMBAR RADICULOPATHY: ICD-10-CM

## 2021-02-03 DIAGNOSIS — M54.16 LUMBAR RADICULOPATHY: Primary | ICD-10-CM

## 2021-02-03 LAB — HBA1C MFR BLD: 5.4 % (ref 0–5.6)

## 2021-02-03 PROCEDURE — 99214 OFFICE O/P EST MOD 30 MIN: CPT | Mod: GC | Performed by: PSYCHIATRY & NEUROLOGY

## 2021-02-03 PROCEDURE — 83036 HEMOGLOBIN GLYCOSYLATED A1C: CPT | Performed by: PATHOLOGY

## 2021-02-03 PROCEDURE — 36415 COLL VENOUS BLD VENIPUNCTURE: CPT | Performed by: PATHOLOGY

## 2021-02-03 RX ORDER — PREGABALIN 50 MG/1
50 CAPSULE ORAL 3 TIMES DAILY
Qty: 90 CAPSULE | Refills: 3 | Status: SHIPPED | OUTPATIENT
Start: 2021-02-03 | End: 2023-10-23

## 2021-02-03 ASSESSMENT — PAIN SCALES - GENERAL: PAINLEVEL: SEVERE PAIN (6)

## 2021-02-03 NOTE — NURSING NOTE
Chief Complaint   Patient presents with     New Patient     [facility] NEW - NEUROPATHY      Ronda Reddy, EMT

## 2021-02-03 NOTE — LETTER
2/3/2021       RE: Rex Bond  [address] [address]     Dear Colleague,    Thank you for referring your patient, Rex Bond, to the [facility] NEUROLOGY CLIN[facility] [place] at [facility]. Please see a copy of my visit note below.    Neuromuscular Consult Note    Chief Complaint: Bilateral leg pain    History of Present Illness:    Rex Bond is a 50 year old male with a h/o chronic low back pain status post L3-5 laminectomy and carpal tunnel syndrome status post release who presents for evaluation of bilateral leg pain.  He stated he has a longstanding history of chronic low back pain and underwent surgery in 2014.  The surgery initially helped and was doing well for many years until he had a flare up of his back pain in August 2019.  He stated shortly thereafter he received steroid injections in the lumbar spine for the back pain in which have limited benefit.  He also noted a burning pain starting in the groin and radiating down the medial aspect of the thigh.  This has been constant since that time and is worse more so at night.  Also in August 2019 he first noted numbness in the soles of the feet bilaterally along with a pins and needle sensation.  This has been constant since that time but has not changed with the right being slightly greater than the left.  He has tried physical therapy for low back pain in the past most recently 2 years ago without improvement.  He had been on gabapentin for many years but he discontinued this due to nausea.  He is currently on duloxetine more so for mood but this has not been helpful for his pain.  He also describes chronic neck pain along with numbness in his hands bilaterally.  He stated he was diagnosed with bilateral carpal tunnel by EMG and underwent carpal tunnel release surgery bilaterally in August 2020 without improvement.     Prior pertinent laboratory work-up:  01/20: TSH borderline low at  0.28 with normal free T4. Normal VEGF, B12, vitamin E, folate, MMA, Lyme antibody, NAILA, rheumatoid factor, ACE, serum immunofixation, vitamin B1, SSA, SSB, ganglioside antibody panel, copper   12/20: Normal ESR and CRP     Prior pertinent imaging work-up:  09/20:  MRI Lumbar spine showed moderate spondylosis L3-L4 and L4-L5 with chronic degenerative endplate change and chronic disc protrusions. Facet arthropathy preferentially on the right at L4-L5. Decompressive laminectomies at L3-L4 and L4-L5. Mild to moderate spinal canal stenosis at L2-L3 with preferential left lateral recess stenosis. Severe right lateral recess stenosis at L3-L4 with potential impingement of the right L4 nerve root in the lateral recess. Moderate to severe right and mild to moderate left foraminal stenosis. Moderate right lateral recess stenosis at L4-L5 and moderate bilateral foraminal stenosis.    Prior electrophysiologic work-up:  10/19: Nerve conduction studies/needle EMG by Dr. Nicole showed that the bilateral lower limb EMG normal except for NR right superficial peroneal sensory nerve response and borderline normal but symmetric sural sensory responses.    Past Medical History:   Past Medical History:   Diagnosis Date     Pancreatic cancer (H)      Past Surgical History:  Past Surgical History:   Procedure Laterality Date     APPENDECTOMY       LAMINECTOMY LUMBAR TWO LEVELS  2014    L3,4,5 surgically repaired     RELEASE CARPAL TUNNEL BILATERAL       VASECTOMY       Family history:    There is no known family history of neuromuscular disorders.     Social History:    Pt chews tobacco, no smoking, rare alcohol use, and no illicit drug use. There is no known exposure to toxins or heavy metals.     Medical Allergies:    No Known Allergies     Current Medications:   Current Outpatient Medications:      allopurinol (ZYLOPRIM) 300 MG tablet, TAKE 1 TABLET(300 MG) BY MOUTH TWICE DAILY, Disp: , Rfl:      amphetamine-dextroamphetamine  (ADDERALL) 15 MG tablet, Take 15 mg by mouth 2 times daily, Disp: , Rfl:      DULoxetine (CYMBALTA) 60 MG capsule,  Instructions: TK ONE C PO  D, Disp: , Rfl:      losartan-hydrochlorothiazide (HYZAAR) 100-12.5 MG tablet, Take 1 tablet by mouth, Disp: , Rfl:      omeprazole (PRILOSEC) 20 MG DR capsule, Take 20 mg by mouth, Disp: , Rfl:      gabapentin (NEURONTIN) 300 MG capsule, Take 2 capsules (600 mg) by mouth 3 times daily (Patient not taking: Reported on 2/3/2021), Disp: 180 capsule, Rfl: 11     pramipexole (MIRAPEX) 0.5 MG tablet, 1 p.o. every morning and 1 p.o. at noon (Patient not taking: Reported on 2/3/2021), Disp: 60 tablet, Rfl: 11     pramipexole (MIRAPEX) 1.5 MG tablet, Take 3 mg by mouth At Bedtime, Disp: , Rfl:      Review of Systems: A complete review of systems was obtained and was negative except for what was noted above.    Physical examination:    BP (!) 144/103   Pulse 107   SpO2 97%   General Appearance: NAD    Skin: There are no rashes or other skin lesions.    Musculoskeletal:  There is no scoliosis, lordosis, kyphosis, pes cavus, or hammertoes.    Neurologic examination:    Mental status:  Patient is alert, attentive, and oriented x 3.  Language is coherent and fluent without aphasia.  Memory, comprehension and ability to follow commands were intact.       Cranial nerves: Pupils were round and reacted to light.  Extraocular movements were full. There was no face, jaw, palate or tongue weakness or atrophy. Hearing was grossly intact.  Shoulder shrug was normal.      Motor exam: No atrophy or fasciculations.  Manual muscle testing revealed the following MRC grade muscle power:   Right Left   Neck flexion 5    Neck extension 5    Shoulder abduction 5 5   Elbow extension 5 5   Elbow flexion  5 5   Wrist extension 5 5   Finger extension 5 5   Finger flexion 5 5   FDI 5 5   APB 5 5   Hip flexion 5 5   Knee extension 5 5   Knee flexion 5 5   Dorsiflexion 5 5   Plantarflexion 5 5   Eversion 5 5    Inversion 5 5   Ext Hallucis Longus 5 5     Complex motor skills: No tremor or ataxia    Sensory exam revealed vibration to be 8-9 seconds at the toe bilaterally and greater than 20 seconds at the fingers bilaterally. PP and proprioception intact.    Gait was narrow and stable.  Pt was able to walk on heels and toes.  Mild difficulty with tandem.     Deep tendon reflexes:   Right Left   Triceps 2 2   Biceps 2 2   Brachioradialis 2 2   Knee jerk 2 2   Ankle jerk 2 2   Plantar responses were flexor bilaterally. Abraham's negative b/l.     Assessment:    Mr. Bond is a 50 year old male with a h/o chronic low back pain status post L3-5 laminectomy and carpal tunnel syndrome status post release who presents for evaluation of bilateral leg pain.  The pain in the medial aspect of his thigh is likely related to lumbar spondylosis and stenosis.  We will plan to evaluate for lumbar radiculopathy with EMG.  The symptoms he is describing in his feet are likely a separate problem.  On examination he had very mild vibration loss at the toes bilaterally and mild difficulty with tandem otherwise it was fairly unremarkable for peripheral neuropathy.  We will plan to further evaluate this with NCS and if negative for large fiber neuropathy we will plan to do skin biopsy.  Lastly we recommended adding pregabalin for pain as well as a multidisciplinary pain clinic referral which he was interested in.    Plan:      1. Labs: HbA1c  2. Neuropathy evaluation: Will obtain nerve conduction studies to evaluate for polyneuropathy. If NCS shows no large fiber neuropathy then will obtain skin biopsy of right foot and calf for IENFD  3. Lumbar and cervical spondylosis and stenosis: We advised him to follow up with his spine team at [facility] for management of cervical and lumbosacral radiculopathies. At the time of the NCS will also obtain EMG of the lower limbs to look for radiculopathy.  4. Chronic pain management: He would benefit from a  multidisciplinary pain management approach that utilizes physical therapy, psychotherapy, and pharmacotherapy to help with the physical and emotional aspects of chronic pain. We will get him in touch with the [facility] multidisciplinary chronic pain management program. Ahead of that appointment we also provided him with a prescription for Lyrica. Side effects and expectations discussed.  He also takes Cymbalta, which he will continue.   5. We have not arranged follow up with Mr. Bond today, but will discuss the results of the NCS and skin biopsy with him when they become available. If neuropathy is found then we will arrange follow up with him in clinic and explore additional causes of neuropathy. His work up for neuropathy has thus far been quite extensive. If neuropathy (large or small fiber) is confirmed then it will likely be classified as idiopathic - although are a few additional potential causes to explore, including TTR mutations. We discussed the occurrence of idiopathic neuropathy today (about half of distal sensory neuropathies are idiopathic) and prognosis of idiopathic neuropathy (generally benign).     Patient seen and examined with attending neurologist. His addendum follows.     Buddy Taylor MD  Neuromuscular Fellow  ---  ADDENDUM:   I personally interviewed and examined the patient. I agree with the history, physical, assessment, and plan as documented above.     Kolby Marks MD    ADDENDUM:   2/10/20: NCS/EMG showed no large fiber polyneuropathy. There was some evidence of a mild old right-sided S1 radic, but no active changes. Will proceed with skin biopsy as above.     3/15/21: Skin biopsy dated 2/10/21 showed IENFD foot 2.91 (N>12.22), calf 10.79 (N>6.72). These findings provide evidence of a length dependant small fiber neuropathy. Etiology is idiopathic. I discussed idiopathic neuropathy with him, which is not uncommon in distal SFN. I would like to arrange TTR testing. Will also  arrange SCN9A and SCN10A testing. I will get him a referral to genetics to help with this. I also encouraged him to follow up in the pain clinic for chronic pain management. He has not yet done this, but will.       Again, thank you for allowing me to participate in the care of your patient.      Sincerely,    Kolby Marks MD

## 2021-02-03 NOTE — PROGRESS NOTES
Neuromuscular Consult Note    Chief Complaint: Bilateral leg pain    History of Present Illness:    Rex Bond is a 50 year old male with a h/o chronic low back pain status post L3-5 laminectomy and carpal tunnel syndrome status post release who presents for evaluation of bilateral leg pain.  He stated he has a longstanding history of chronic low back pain and underwent surgery in 2014.  The surgery initially helped and was doing well for many years until he had a flare up of his back pain in August 2019.  He stated shortly thereafter he received steroid injections in the lumbar spine for the back pain in which have limited benefit.  He also noted a burning pain starting in the groin and radiating down the medial aspect of the thigh.  This has been constant since that time and is worse more so at night.  Also in August 2019 he first noted numbness in the soles of the feet bilaterally along with a pins and needle sensation.  This has been constant since that time but has not changed with the right being slightly greater than the left.  He has tried physical therapy for low back pain in the past most recently 2 years ago without improvement.  He had been on gabapentin for many years but he discontinued this due to nausea.  He is currently on duloxetine more so for mood but this has not been helpful for his pain.  He also describes chronic neck pain along with numbness in his hands bilaterally.  He stated he was diagnosed with bilateral carpal tunnel by EMG and underwent carpal tunnel release surgery bilaterally in August 2020 without improvement.     Prior pertinent laboratory work-up:  01/20: TSH borderline low at 0.28 with normal free T4. Normal VEGF, B12, vitamin E, folate, MMA, Lyme antibody, NAILA, rheumatoid factor, ACE, serum immunofixation, vitamin B1, SSA, SSB, ganglioside antibody panel, copper   12/20: Normal ESR and CRP     Prior pertinent imaging work-up:  09/20:  MRI Lumbar spine showed moderate  spondylosis L3-L4 and L4-L5 with chronic degenerative endplate change and chronic disc protrusions. Facet arthropathy preferentially on the right at L4-L5. Decompressive laminectomies at L3-L4 and L4-L5. Mild to moderate spinal canal stenosis at L2-L3 with preferential left lateral recess stenosis. Severe right lateral recess stenosis at L3-L4 with potential impingement of the right L4 nerve root in the lateral recess. Moderate to severe right and mild to moderate left foraminal stenosis. Moderate right lateral recess stenosis at L4-L5 and moderate bilateral foraminal stenosis.    Prior electrophysiologic work-up:  10/19: Nerve conduction studies/needle EMG by Dr. Nicole showed that the bilateral lower limb EMG normal except for NR right superficial peroneal sensory nerve response and borderline normal but symmetric sural sensory responses.    Past Medical History:   Past Medical History:   Diagnosis Date     Pancreatic cancer (H)      Past Surgical History:  Past Surgical History:   Procedure Laterality Date     APPENDECTOMY       LAMINECTOMY LUMBAR TWO LEVELS  2014    L3,4,5 surgically repaired     RELEASE CARPAL TUNNEL BILATERAL       VASECTOMY       Family history:    There is no known family history of neuromuscular disorders.     Social History:    Pt chews tobacco, no smoking, rare alcohol use, and no illicit drug use. There is no known exposure to toxins or heavy metals.     Medical Allergies:    No Known Allergies     Current Medications:   Current Outpatient Medications:      allopurinol (ZYLOPRIM) 300 MG tablet, TAKE 1 TABLET(300 MG) BY MOUTH TWICE DAILY, Disp: , Rfl:      amphetamine-dextroamphetamine (ADDERALL) 15 MG tablet, Take 15 mg by mouth 2 times daily, Disp: , Rfl:      DULoxetine (CYMBALTA) 60 MG capsule,  Instructions: TK ONE C PO  D, Disp: , Rfl:      losartan-hydrochlorothiazide (HYZAAR) 100-12.5 MG tablet, Take 1 tablet by mouth, Disp: , Rfl:      omeprazole (PRILOSEC) 20 MG DR capsule,  Take 20 mg by mouth, Disp: , Rfl:      gabapentin (NEURONTIN) 300 MG capsule, Take 2 capsules (600 mg) by mouth 3 times daily (Patient not taking: Reported on 2/3/2021), Disp: 180 capsule, Rfl: 11     pramipexole (MIRAPEX) 0.5 MG tablet, 1 p.o. every morning and 1 p.o. at noon (Patient not taking: Reported on 2/3/2021), Disp: 60 tablet, Rfl: 11     pramipexole (MIRAPEX) 1.5 MG tablet, Take 3 mg by mouth At Bedtime, Disp: , Rfl:      Review of Systems: A complete review of systems was obtained and was negative except for what was noted above.    Physical examination:    BP (!) 144/103   Pulse 107   SpO2 97%   General Appearance: NAD    Skin: There are no rashes or other skin lesions.    Musculoskeletal:  There is no scoliosis, lordosis, kyphosis, pes cavus, or hammertoes.    Neurologic examination:    Mental status:  Patient is alert, attentive, and oriented x 3.  Language is coherent and fluent without aphasia.  Memory, comprehension and ability to follow commands were intact.       Cranial nerves: Pupils were round and reacted to light.  Extraocular movements were full. There was no face, jaw, palate or tongue weakness or atrophy. Hearing was grossly intact.  Shoulder shrug was normal.      Motor exam: No atrophy or fasciculations.  Manual muscle testing revealed the following MRC grade muscle power:   Right Left   Neck flexion 5    Neck extension 5    Shoulder abduction 5 5   Elbow extension 5 5   Elbow flexion  5 5   Wrist extension 5 5   Finger extension 5 5   Finger flexion 5 5   FDI 5 5   APB 5 5   Hip flexion 5 5   Knee extension 5 5   Knee flexion 5 5   Dorsiflexion 5 5   Plantarflexion 5 5   Eversion 5 5   Inversion 5 5   Ext Hallucis Longus 5 5     Complex motor skills: No tremor or ataxia    Sensory exam revealed vibration to be 8-9 seconds at the toe bilaterally and greater than 20 seconds at the fingers bilaterally. PP and proprioception intact.    Gait was narrow and stable.  Pt was able to walk on  heels and toes.  Mild difficulty with tandem.     Deep tendon reflexes:   Right Left   Triceps 2 2   Biceps 2 2   Brachioradialis 2 2   Knee jerk 2 2   Ankle jerk 2 2   Plantar responses were flexor bilaterally. Abraham's negative b/l.     Assessment:    Mr. Bond is a 50 year old male with a h/o chronic low back pain status post L3-5 laminectomy and carpal tunnel syndrome status post release who presents for evaluation of bilateral leg pain.  The pain in the medial aspect of his thigh is likely related to lumbar spondylosis and stenosis.  We will plan to evaluate for lumbar radiculopathy with EMG.  The symptoms he is describing in his feet are likely a separate problem.  On examination he had very mild vibration loss at the toes bilaterally and mild difficulty with tandem otherwise it was fairly unremarkable for peripheral neuropathy.  We will plan to further evaluate this with NCS and if negative for large fiber neuropathy we will plan to do skin biopsy.  Lastly we recommended adding pregabalin for pain as well as a multidisciplinary pain clinic referral which he was interested in.    Plan:      1. Labs: HbA1c  2. Neuropathy evaluation: Will obtain nerve conduction studies to evaluate for polyneuropathy. If NCS shows no large fiber neuropathy then will obtain skin biopsy of right foot and calf for IENFD  3. Lumbar and cervical spondylosis and stenosis: We advised him to follow up with his spine team at [facility] for management of cervical and lumbosacral radiculopathies. At the time of the NCS will also obtain EMG of the lower limbs to look for radiculopathy.  4. Chronic pain management: He would benefit from a multidisciplinary pain management approach that utilizes physical therapy, psychotherapy, and pharmacotherapy to help with the physical and emotional aspects of chronic pain. We will get him in touch with the [facility] multidisciplinary chronic pain management program. Ahead of that appointment we  also provided him with a prescription for Lyrica. Side effects and expectations discussed.  He also takes Cymbalta, which he will continue.   5. We have not arranged follow up with Mr. Bond today, but will discuss the results of the NCS and skin biopsy with him when they become available. If neuropathy is found then we will arrange follow up with him in clinic and explore additional causes of neuropathy. His work up for neuropathy has thus far been quite extensive. If neuropathy (large or small fiber) is confirmed then it will likely be classified as idiopathic - although are a few additional potential causes to explore, including TTR mutations. We discussed the occurrence of idiopathic neuropathy today (about half of distal sensory neuropathies are idiopathic) and prognosis of idiopathic neuropathy (generally benign).     Patient seen and examined with attending neurologist. His addendum follows.     Buddy Taylor MD  Neuromuscular Fellow  ---  ADDENDUM:   I personally interviewed and examined the patient. I agree with the history, physical, assessment, and plan as documented above.     Kolby Marks MD    ADDENDUM:   2/10/20: NCS/EMG showed no large fiber polyneuropathy. There was some evidence of a mild old right-sided S1 radic, but no active changes. Will proceed with skin biopsy as above.     3/15/21: Skin biopsy dated 2/10/21 showed IENFD foot 2.91 (N>12.22), calf 10.79 (N>6.72). These findings provide evidence of a length dependant small fiber neuropathy. Etiology is idiopathic. I discussed idiopathic neuropathy with him, which is not uncommon in distal SFN. I would like to arrange TTR testing. Will also arrange SCN9A and SCN10A testing. I will get him a referral to genetics to help with this. I also encouraged him to follow up in the pain clinic for chronic pain management. He has not yet done this, but will.

## 2021-02-08 ENCOUNTER — COMMUNICATION - HEALTHEAST (OUTPATIENT)
Dept: FAMILY MEDICINE | Facility: CLINIC | Age: 51
End: 2021-02-08

## 2021-02-08 DIAGNOSIS — F98.8 ATTENTION DEFICIT DISORDER (ADD) WITHOUT HYPERACTIVITY: ICD-10-CM

## 2021-02-10 ENCOUNTER — OFFICE VISIT (OUTPATIENT)
Dept: NEUROLOGY | Facility: CLINIC | Age: 51
End: 2021-02-10
Payer: COMMERCIAL

## 2021-02-10 ENCOUNTER — TELEPHONE (OUTPATIENT)
Dept: NEUROLOGY | Facility: CLINIC | Age: 51
End: 2021-02-10

## 2021-02-10 DIAGNOSIS — M47.27 OSTEOARTHRITIS OF SPINE WITH RADICULOPATHY, LUMBOSACRAL REGION: Primary | ICD-10-CM

## 2021-02-10 DIAGNOSIS — G62.9 NEUROPATHY: Primary | ICD-10-CM

## 2021-02-10 PROCEDURE — 95910 NRV CNDJ TEST 7-8 STUDIES: CPT | Performed by: PSYCHIATRY & NEUROLOGY

## 2021-02-10 PROCEDURE — 11104 PUNCH BX SKIN SINGLE LESION: CPT | Performed by: PSYCHIATRY & NEUROLOGY

## 2021-02-10 PROCEDURE — 95886 MUSC TEST DONE W/N TEST COMP: CPT | Performed by: PSYCHIATRY & NEUROLOGY

## 2021-02-10 PROCEDURE — 11105 PUNCH BX SKIN EA SEP/ADDL: CPT | Performed by: PSYCHIATRY & NEUROLOGY

## 2021-02-10 NOTE — LETTER
2/10/2021       RE: Rex Bond  [address] [address]     Dear Colleague,    Thank you for referring your patient, Rex Bond, to the [facility] EMG CLINIC [place] at [facility]. Please see a copy of my visit note below.    Procedure Note: Neurodiagnostic skin biopsy   ?Reason for biopsy: Pain in feet. Eval for small fiber neuropathy.   Written informed consent was obtained.   ?   The standard sites on the left foot dorsum and medial calf were sterilely prepped. Xylocaine 1% was administered by subdermal injection. A total of 7 mL was used. A 3 mm punch biopsy was removed from each site. The specimens were placed in fixative. The specimen from the foot was placed in a vial labeled as left foot and the specimen from the calf was labeled as left calf. The presence of the specimen in the vial was verified by two individuals. After verification the samples were sent to the neuropathology laboratory for analysis. The biopsy sites were dressed with a pressure bandage. Estimated blood loss was less than 10 drops total. The patient was informed about post-procedure bandage and biopsy site care. He was advised that it might take up to 6 weeks for results of the test to be available. No complications.       Again, thank you for allowing me to participate in the care of your patient.      Sincerely,    Kolby Marks MD

## 2021-02-10 NOTE — PROGRESS NOTES
[facility]  Electrodiagnostic Laboratory    Nerve Conduction & EMG Report          Patient:       Rex Bond  Patient ID:    [id number]  Gender:        Male  YOB: 1970  Age:           50 Years 10 Months        History & Examination:  50 year old male with a h/o chronic low back pain status post L3-5 who reports leg pain as well as numbness and paresthesias in the soles of his feet bilaterally, but right more affected than the left. Evaluate for polyneuropathy vs radiculopathy.    Techniques: Motor and sensory conduction studies were done with surface recording electrodes. EMG was done with a concentric needle electrode.      Results:  Nerve conduction studies:   1. Bilateral sural and superficial peroneal sensory responses are normal.   2. Bilateral peroneal-EDB and tibial-AH motor responses are normal.     Needle EM. No abnormal spontaneous activity was seen in the sampled muscles.   2. Large amplitude and/or long duration motor unit potentials (MUP) were seen in the right gastrocnemius and glut max muscles.   3. Recruitment patterns were normal.     Interpretation:  This is a mildly abnormal study. There is electrophysiologic evidence suggestive of a mild chronic right-sided S1 radiculopathy. The absence of active denervation changes argues against a recent nerve or nerve root injury. There is no evidence of a large-fiber polyneuropathy affecting the lower limbs on the basis of this study.     Kolby Marks MD  Department of Neurology         Sensory NCS      Nerve / Sites Rec. Site Onset Peak NP Amp Ref. PP Amp Dist Ashu Ref. Temp     ms ms  V  V  V cm m/s m/s  C   R SURAL - Lat Mall      Calf Ankle 2.50 3.59 10.8 8.0 13.4 14 56.0 38.0 32   L SURAL - Lat Mall      Calf Ankle 2.81 3.80 8.0 8.0 10.6 14 49.8 38.0 31.1   R SUP PERONEAL      Lat Leg Dickerson 2.66 3.54 6.2  5.0 12.5 47.1 38.0 31.7   L SUP PERONEAL      Lat Leg Dickerson 2.34 3.23 6.2  6.5 12.5 53.3 38.0 31.1       Motor  NCS      Nerve / Sites Rec. Site Lat Ref. Amp Ref. Rel Amp Dist Ashu Ref. Dur. Area Temp.     ms ms mV mV % cm m/s m/s ms %  C   L DEEP PERONEAL - EDB      Ankle EDB 3.75 6.00 6.6 2.5 100 8   4.84 100 31.1   R DEEP PERONEAL - EDB 60      Ankle EDB 4.06 6.00 5.0 2.0 100 8   6.82 100 31.6      FibHead EDB 12.08  4.7  94.5 36 44.9 38.0 7.76 216 31.6      Pop Fos EDB 14.38  4.5  90.5 10 43.6 38.0 7.19 204 31.6   R TIBIAL - AH      Ankle AH 3.54 6.00 11.1 4.0 100 8   5.36 100 31.6      Pop Fos AH 13.65  8.4  75.4 44 43.5 38.0 5.94 86.2 31.5   L TIBIAL - AH      Ankle AH 3.33 6.00 9.8 4.0 100 8   5.31 100 31.2       EMG Summary Table     Spontaneous MUAP Recruitment    IA Fib/PSW Fasc H.F. Amp Dur. PPP Pattern   R. VAST LATERALIS N None None None N N N N   R. TIB ANTERIOR N None None None N N N N   R. GASTROCN (MED) N None None None 1+ 1+ N N   R. PERON LONGUS N None None None N N N N   R. GLUT MAX N None None None N 1+ 1+ N

## 2021-02-10 NOTE — PROGRESS NOTES
Procedure Note: Neurodiagnostic skin biopsy   ?Reason for biopsy: Pain in feet. Eval for small fiber neuropathy.   Written informed consent was obtained.   ?   The standard sites on the left foot dorsum and medial calf were sterilely prepped. Xylocaine 1% was administered by subdermal injection. A total of 7 mL was used. A 3 mm punch biopsy was removed from each site. The specimens were placed in fixative. The specimen from the foot was placed in a vial labeled as left foot and the specimen from the calf was labeled as left calf. The presence of the specimen in the vial was verified by two individuals. After verification the samples were sent to the neuropathology laboratory for analysis. The biopsy sites were dressed with a pressure bandage. Estimated blood loss was less than 10 drops total. The patient was informed about post-procedure bandage and biopsy site care. He was advised that it might take up to 6 weeks for results of the test to be available. No complications.

## 2021-02-10 NOTE — LETTER
2/10/2021       RE: Rex Bond  [address] [address]     Dear Colleague,    Thank you for referring your patient, Rex Bond, to the [facility] EMG [facility]IN[facility] [place] at [facility]. Please see a copy of my visit note below.        [facility]  Electrodiagnostic Laboratory    Nerve Conduction & EMG Report          Patient:       Rex Bond  Patient ID:    [id number]  Gender:        Male  YOB: 1970  Age:           50 Years 10 Months        History & Examination:  50 year old male with a h/o chronic low back pain status post L3-5 who reports leg pain as well as numbness and paresthesias in the soles of his feet bilaterally, but right more affected than the left. Evaluate for polyneuropathy vs radiculopathy.    Techniques: Motor and sensory conduction studies were done with surface recording electrodes. EMG was done with a concentric needle electrode.      Results:  Nerve conduction studies:   1. Bilateral sural and superficial peroneal sensory responses are normal.   2. Bilateral peroneal-EDB and tibial-AH motor responses are normal.     Needle EM. No abnormal spontaneous activity was seen in the sampled muscles.   2. Large amplitude and/or long duration motor unit potentials (MUP) were seen in the right gastrocnemius and glut max muscles.   3. Recruitment patterns were normal.     Interpretation:  This is a mildly abnormal study. There is electrophysiologic evidence suggestive of a mild chronic right-sided S1 radiculopathy. The absence of active denervation changes argues against a recent nerve or nerve root injury. There is no evidence of a large-fiber polyneuropathy affecting the lower limbs on the basis of this study.     Kolby Marks MD  Department of Neurology         Sensory NCS      Nerve / Sites Rec. Site Onset Peak NP Amp Ref. PP Amp Dist Ashu Ref. Temp     ms ms  V  V  V cm m/s m/s  C   R SURAL  - Lat Mall      Calf Ankle 2.50 3.59 10.8 8.0 13.4 14 56.0 38.0 32   L SURAL - Lat Mall      Calf Ankle 2.81 3.80 8.0 8.0 10.6 14 49.8 38.0 31.1   R SUP PERONEAL      Lat Leg Dickerson 2.66 3.54 6.2  5.0 12.5 47.1 38.0 31.7   L SUP PERONEAL      Lat Leg Dickerson 2.34 3.23 6.2  6.5 12.5 53.3 38.0 31.1       Motor NCS      Nerve / Sites Rec. Site Lat Ref. Amp Ref. Rel Amp Dist Ashu Ref. Dur. Area Temp.     ms ms mV mV % cm m/s m/s ms %  C   L DEEP PERONEAL - EDB      Ankle EDB 3.75 6.00 6.6 2.5 100 8   4.84 100 31.1   R DEEP PERONEAL - EDB 60      Ankle EDB 4.06 6.00 5.0 2.0 100 8   6.82 100 31.6      FibHead EDB 12.08  4.7  94.5 36 44.9 38.0 7.76 216 31.6      Pop Fos EDB 14.38  4.5  90.5 10 43.6 38.0 7.19 204 31.6   R TIBIAL - AH      Ankle AH 3.54 6.00 11.1 4.0 100 8   5.36 100 31.6      Pop Fos AH 13.65  8.4  75.4 44 43.5 38.0 5.94 86.2 31.5   L TIBIAL - AH      Ankle AH 3.33 6.00 9.8 4.0 100 8   5.31 100 31.2       EMG Summary Table     Spontaneous MUAP Recruitment    IA Fib/PSW Fasc H.F. Amp Dur. PPP Pattern   R. VAST LATERALIS N None None None N N N N   R. TIB ANTERIOR N None None None N N N N   R. GASTROCN (MED) N None None None 1+ 1+ N N   R. PERON LONGUS N None None None N N N N   R. GLUT MAX N None None None N 1+ 1+ N                              Again, thank you for allowing me to participate in the care of your patient.      Sincerely,    Kolby Marks MD

## 2021-03-10 ENCOUNTER — COMMUNICATION - HEALTHEAST (OUTPATIENT)
Dept: FAMILY MEDICINE | Facility: CLINIC | Age: 51
End: 2021-03-10

## 2021-03-10 DIAGNOSIS — F98.8 ATTENTION DEFICIT DISORDER (ADD) WITHOUT HYPERACTIVITY: ICD-10-CM

## 2021-03-12 LAB — LAB SCANNED RESULT: ABNORMAL

## 2021-03-17 ENCOUNTER — TELEPHONE (OUTPATIENT)
Dept: CONSULT | Facility: CLINIC | Age: 51
End: 2021-03-17

## 2021-03-17 NOTE — TELEPHONE ENCOUNTER
Spoke with patient regarding scheduling GC only visit with Angelita Kaur or Ellen Dodge in adult Neuro. Patient was busy at time of call but will call me back directly to schedule appointment.

## 2021-03-19 NOTE — TELEPHONE ENCOUNTER
LVM asking patient to call me back directly to schedule GC only visit with Angelita Kaur or Ellen Dodge in adult Neuro.

## 2021-03-29 ENCOUNTER — COMMUNICATION - HEALTHEAST (OUTPATIENT)
Dept: FAMILY MEDICINE | Facility: CLINIC | Age: 51
End: 2021-03-29

## 2021-03-29 DIAGNOSIS — M54.42 CHRONIC BILATERAL LOW BACK PAIN WITH BILATERAL SCIATICA: ICD-10-CM

## 2021-03-29 DIAGNOSIS — G89.29 CHRONIC BILATERAL LOW BACK PAIN WITH BILATERAL SCIATICA: ICD-10-CM

## 2021-03-29 DIAGNOSIS — R45.4 IRRITABILITY: ICD-10-CM

## 2021-03-29 DIAGNOSIS — M54.41 CHRONIC BILATERAL LOW BACK PAIN WITH BILATERAL SCIATICA: ICD-10-CM

## 2021-03-29 NOTE — TELEPHONE ENCOUNTER
3/26-message left for patient to call me back directly to schedule GC only visit with Ellen Dodge.

## 2021-03-30 ENCOUNTER — AMBULATORY - HEALTHEAST (OUTPATIENT)
Dept: NURSING | Facility: CLINIC | Age: 51
End: 2021-03-30

## 2021-04-07 ENCOUNTER — COMMUNICATION - HEALTHEAST (OUTPATIENT)
Dept: FAMILY MEDICINE | Facility: CLINIC | Age: 51
End: 2021-04-07

## 2021-04-07 DIAGNOSIS — F98.8 ATTENTION DEFICIT DISORDER (ADD) WITHOUT HYPERACTIVITY: ICD-10-CM

## 2021-04-20 ENCOUNTER — AMBULATORY - HEALTHEAST (OUTPATIENT)
Dept: NURSING | Facility: CLINIC | Age: 51
End: 2021-04-20

## 2021-05-04 ENCOUNTER — RECORDS - HEALTHEAST (OUTPATIENT)
Dept: FAMILY MEDICINE | Facility: CLINIC | Age: 51
End: 2021-05-04

## 2021-05-06 ENCOUNTER — RECORDS - HEALTHEAST (OUTPATIENT)
Dept: GENERAL RADIOLOGY | Facility: CLINIC | Age: 51
End: 2021-05-06

## 2021-05-06 ENCOUNTER — OFFICE VISIT - HEALTHEAST (OUTPATIENT)
Dept: FAMILY MEDICINE | Facility: CLINIC | Age: 51
End: 2021-05-06

## 2021-05-06 ENCOUNTER — COMMUNICATION - HEALTHEAST (OUTPATIENT)
Dept: FAMILY MEDICINE | Facility: CLINIC | Age: 51
End: 2021-05-06

## 2021-05-06 DIAGNOSIS — M79.644 PAIN IN RIGHT FINGER(S): ICD-10-CM

## 2021-05-06 DIAGNOSIS — Z79.899 MEDICATION MANAGEMENT: ICD-10-CM

## 2021-05-06 DIAGNOSIS — K50.90 CROHN'S DISEASE WITHOUT COMPLICATION, UNSPECIFIED GASTROINTESTINAL TRACT LOCATION (H): ICD-10-CM

## 2021-05-06 DIAGNOSIS — I10 ESSENTIAL HYPERTENSION: ICD-10-CM

## 2021-05-06 DIAGNOSIS — F98.8 ATTENTION DEFICIT DISORDER (ADD) WITHOUT HYPERACTIVITY: ICD-10-CM

## 2021-05-06 DIAGNOSIS — M79.644 PAIN OF FINGER OF RIGHT HAND: ICD-10-CM

## 2021-05-06 DIAGNOSIS — G89.4 CHRONIC PAIN SYNDROME: ICD-10-CM

## 2021-05-06 LAB
AMPHETAMINES UR QL SCN: ABNORMAL
ANION GAP SERPL CALCULATED.3IONS-SCNC: 13 MMOL/L (ref 5–18)
BARBITURATES UR QL: ABNORMAL
BENZODIAZ UR QL: ABNORMAL
BUN SERPL-MCNC: 15 MG/DL (ref 8–22)
CALCIUM SERPL-MCNC: 9 MG/DL (ref 8.5–10.5)
CANNABINOIDS UR QL SCN: ABNORMAL
CHLORIDE BLD-SCNC: 103 MMOL/L (ref 98–107)
CO2 SERPL-SCNC: 26 MMOL/L (ref 22–31)
COCAINE UR QL: ABNORMAL
CREAT SERPL-MCNC: 0.9 MG/DL (ref 0.7–1.3)
CREAT UR-MCNC: 229 MG/DL
GFR SERPL CREATININE-BSD FRML MDRD: >60 ML/MIN/1.73M2
GLUCOSE BLD-MCNC: 97 MG/DL (ref 70–125)
METHADONE UR QL SCN: ABNORMAL
OPIATES UR QL SCN: ABNORMAL
OXYCODONE UR QL: ABNORMAL
PCP UR QL SCN: ABNORMAL
POTASSIUM BLD-SCNC: 4.2 MMOL/L (ref 3.5–5)
RHEUMATOID FACT SERPL-ACNC: <15 IU/ML (ref 0–30)
SODIUM SERPL-SCNC: 142 MMOL/L (ref 136–145)
URATE SERPL-MCNC: 5.3 MG/DL (ref 3–8)

## 2021-05-07 LAB — ANA SER QL: 0.2 U

## 2021-05-09 ENCOUNTER — AMBULATORY - HEALTHEAST (OUTPATIENT)
Dept: FAMILY MEDICINE | Facility: CLINIC | Age: 51
End: 2021-05-09

## 2021-05-09 DIAGNOSIS — M79.644 PAIN OF FINGER OF RIGHT HAND: ICD-10-CM

## 2021-05-10 ENCOUNTER — COMMUNICATION - HEALTHEAST (OUTPATIENT)
Dept: FAMILY MEDICINE | Facility: CLINIC | Age: 51
End: 2021-05-10

## 2021-05-25 ENCOUNTER — RECORDS - HEALTHEAST (OUTPATIENT)
Dept: ADMINISTRATIVE | Facility: CLINIC | Age: 51
End: 2021-05-25

## 2021-05-26 ASSESSMENT — PATIENT HEALTH QUESTIONNAIRE - PHQ9
SUM OF ALL RESPONSES TO PHQ QUESTIONS 1-9: 3
SUM OF ALL RESPONSES TO PHQ QUESTIONS 1-9: 6

## 2021-05-27 ENCOUNTER — RECORDS - HEALTHEAST (OUTPATIENT)
Dept: ADMINISTRATIVE | Facility: CLINIC | Age: 51
End: 2021-05-27

## 2021-05-27 VITALS
SYSTOLIC BLOOD PRESSURE: 120 MMHG | DIASTOLIC BLOOD PRESSURE: 80 MMHG | WEIGHT: 253 LBS | HEART RATE: 80 BPM | BODY MASS INDEX: 34.31 KG/M2

## 2021-05-27 NOTE — TELEPHONE ENCOUNTER
PA APPROVED:    Approval start date:04/14/2019  Approval end date:04/14/2022    Pharmacy has been notified of approval and will contact patient when medication is ready for pickup.

## 2021-05-27 NOTE — TELEPHONE ENCOUNTER
FYI - Status Update  Who is Calling: Spouse  Update: Patient's spouse would like labs faxed to a specific fax number once completed. The patient's spouse was unable to provide fax number and will return call once she has it.   Okay to leave a detailed message?:  No

## 2021-05-27 NOTE — TELEPHONE ENCOUNTER
Central PA team  [phone]  Pool: HE PA MED ([id number])          PA has been initiated.       PA form completed and faxed insurance via Cover My Meds     Key:  Manually faxed     Medication:  adderall xr 30    Insurance:  [employer]        Response will be received via fax and may take up to 5-10 business days depending on plan

## 2021-05-27 NOTE — TELEPHONE ENCOUNTER
FYI - Status Update  Who is Calling: Spouse, Lizzette  Update: Caller stated the lab results should be faxed to Dr. Talha Umaña at the [facility].  Okay to leave a detailed message?:  No return call needed

## 2021-05-27 NOTE — TELEPHONE ENCOUNTER
Prior Authorization Request  Who s requesting:   Patient's wife, Lizzette, consent to communicate form on file.  Pharmacy Name and Location: [facility] [address] [place] [address] AT [place] [id number]  Medication Name: dextroamphetamine-amphetamine (ADDERALL XR) 30 MG 24 hr capsule  Insurance Plan:  [employer]- [phone]  Insurance Member ID Number:  [id number]  Informed patient that prior authorizations can take up to 10 business days for response:   Yes  Okay to leave a detailed message: Yes, ok to call or send message via [employer]

## 2021-05-27 NOTE — TELEPHONE ENCOUNTER
FYI - Status Update  Who is Calling: Patient  Update: Patient scheduled a same day lab appointment.  Okay to leave a detailed message?:  No return call needed

## 2021-05-28 ASSESSMENT — ANXIETY QUESTIONNAIRES: GAD7 TOTAL SCORE: 0

## 2021-05-28 NOTE — PROGRESS NOTES
Assessment and Plan:     1. Attention deficit hyperactivity disorder (ADHD), combined type  2. Episodic mydriasis of right eye  Patient has had episodic mydriasis.  This can be a side effect of Adderall.  Will discontinue Adderall.  I encouraged him to avoid medications for ADHD for 1 to 2 weeks to see if the mydriasis improves.  I did provide prescription for Strattera 40 mg daily if he would like to replace the Adderall.  He will notify me of any improvement via my chart.  If no improvement, he does plan on restarting Adderall.  - atomoxetine (STRATTERA) 40 MG capsule; Take 1 capsule (40 mg total) by mouth daily.  Dispense: 30 capsule; Refill: 0    3. Anxiety  Will restart venlafaxine at 37.5 mg daily.  May increase to 75 mg after 2 weeks.  This was his maintenance dose in the past.  - venlafaxine (EFFEXOR XR) 37.5 MG 24 hr capsule; Take 1 capsule (37.5 mg total) by mouth daily. May increase to 2 capsules by mouth once daily after 2 weeks.  Dispense: 60 capsule; Refill: 0    4. Edema, unspecified type  We will check renal function.  Blood pressure is controlled today.  He continues losartan-hydrochlorothiazide as prescribed.  - Magnesium  - Comprehensive Metabolic Panel    5. Leg cramps  We will check electrolytes and magnesium.  Discussed importance of good hydration.  He is content with the plan.  - Magnesium  - Comprehensive Metabolic Panel        Subjective:     Rex is a 49 y.o. male presenting to the clinic for medication management.  Patient has been dealing with right eye irritation.  Patient noticed that his eye was red 1-1/2 months ago.  He has been dealing with intermittent pupil dilation.  He has been seeing a retinal specialist and was told that he may have some scar tissue within the eye.  His wife is present today and believes that the ophthalmologist mentioned iritis.  He has tried different ophthalmic drops with no relief.  He continues to experience photophobia.  Patient does have ADHD and is  "taking Adderall XR 30 mg daily.  Patient ran out of medication 2 days ago.  He feels as though the Adderall assists with concentration and helps him to complete tasks in a timely manner.  He also feels less irritated.  Patient has a history of anxiety.  Patient's wife would like him to restart the venlafaxine.  She states he wakes up in the morning agitated and \"mad at the world.\" She believes that nobody can do anything right at that time.  He is short-tempered. He admits to stressors at work.  He has hypertension is taking losartan-hydrochlorothiazide.  He noticed one day last week that he had lower extremity edema where he noted that his sock line was present.  He did have some leg cramps that day as well.  He denies any edema or leg cramps since.  He does take omeprazole for esophageal reflux.  He has a history of a schwannoma and was seen at [facility] in 2012 where it was resected.  He has not had any complications since.    Review of Systems: A complete 14 point review of systems was obtained and is negative or as stated in the history of present illness.    Social History     Socioeconomic History     Marital status:      Spouse name: Lizzette     Number of children: 2     Years of education: Not on file     Highest education level: Not on file   Occupational History     Occupation: construction     Employer: [employer]   Social Needs     Financial resource strain: Not on file     Food insecurity:     Worry: Not on file     Inability: Not on file     Transportation needs:     Medical: Not on file     Non-medical: Not on file   Tobacco Use     Smoking status: Never Smoker     Smokeless tobacco: Current User     Types: Chew   Substance and Sexual Activity     Alcohol use: Yes     Alcohol/week: 0.5 oz     Types: 1 Standard drinks or equivalent per week     Comment: occasionally mostly on the weekend, rarely drinks     Drug use: No     Sexual activity: Yes     Partners: Female     Birth " control/protection: Surgical     Comment: vasectomy   Lifestyle     Physical activity:     Days per week: Not on file     Minutes per session: Not on file     Stress: Not on file   Relationships     Social connections:     Talks on phone: Not on file     Gets together: Not on file     Attends Confucianism service: Not on file     Active member of club or organization: Not on file     Attends meetings of clubs or organizations: Not on file     Relationship status: Not on file     Intimate partner violence:     Fear of current or ex partner: Not on file     Emotionally abused: Not on file     Physically abused: Not on file     Forced sexual activity: Not on file   Other Topics Concern     Not on file   Social History Narrative    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           Active Ambulatory Problems     Diagnosis Date Noted     Serum Enzyme Levels - ALT (SGPT) Elevated      Crohn's Disease      Cramp Of Limb      Esophageal Reflux      Acute Gout      Gout      Hypertension      Herniated Intervertebral Disc      Diverticulosis      Soft Tissue Neoplasm Of The Abdomen      Testicular Neoplasm      Bright Red Blood Per Rectum      Arthropathy Of The Ankle / Foot      Maisha-rectal abscess 04/14/2015     Diverticulosis of intestine without bleeding 10/06/2015     Internal hemorrhoids 10/06/2015     Chewing tobacco use 03/23/2016     Obesity 03/23/2016     Hyperlipidemia 03/23/2016     Anxiety 03/23/2016     Furuncle of forehead 03/01/2017     Obesity (BMI 35.0-39.9) with comorbidity (H) 10/30/2018     Resolved Ambulatory Problems     Diagnosis Date Noted     Hypokalemia      Skin Symptoms      Headache      Anxiety      Foot Pain (Soft Tissue)      Acrochordon      Motion sickness      Lower Back Pain      Hyperlipidemia      Past Medical History:   Diagnosis Date     Anxiety      Crohn disease (H)      Diverticulosis      Esophageal reflux      Gout      Herniated disc      HTN (hypertension)      Hyperlipidemia       Hypokalemia      Motion sickness      Pancreatic cancer (H)        Family History   Problem Relation Age of Onset     Hypertension Mother      Thyroid disease Mother      Diabetes Father      Hypertension Father      Heart disease Paternal Grandfather      Stroke Paternal Grandmother      Stroke Maternal Grandmother      Throat cancer Maternal Grandmother         Laryngeal cancer       Objective:     /80   Pulse 96   Ht 6' (1.829 m)   Wt (!) 250 lb (113.4 kg)   SpO2 98%   BMI 33.91 kg/m      Patient is alert, in no obvious distress.   Skin: Warm, dry.  No lesions or rashes.  Skin turgor rapid return.   HEENT:  Head normocephalic, atraumatic.  Conjunctiva in right eye is red.  Pupil is mildly dilated.  Left eye is normal.  PERRL.  EOM's intact. Ears normal.  Nose patent, mucosa pink.  Oropharynx mucosa pink.  No lesions or tonsillar enlargement.   Neck: Supple, no lymphadenopathy.   Lungs:  Clear to auscultation. Respirations even and unlabored.  No wheezing or rales noted.   Heart:  Regular rate and rhythm.  No murmurs.   Musculoskeletal: No edema is present in bilateral lower extremities.

## 2021-05-28 NOTE — TELEPHONE ENCOUNTER
Controlled Substance Refill Request  Medication Name:   Requested Prescriptions     Pending Prescriptions Disp Refills     dextroamphetamine-amphetamine (ADDERALL XR) 30 MG 24 hr capsule 30 capsule 0     Sig: Take 1 capsule (30 mg total) by mouth daily.     Date Last Fill: 3/28/2019  Pharmacy: [id number]      Submit electronically to pharmacy  Controlled Substance Agreement Date Scanned:   Encounter-Level CSA Scan Date:    There are no encounter-level csa scan date.       Last office visit with prescriber/PCP: 3/4/2019 Anisa Bush CNP OR same dept: 3/4/2019 Anisa Bush CNP OR same specialty: 3/4/2019 Anisa Bush CNP  Last physical: 10/30/2018 Last MTM visit: Visit date not found

## 2021-05-29 NOTE — TELEPHONE ENCOUNTER
Refill Approved    Rx renewed per Medication Renewal Policy. Medication was last renewed on 4/7/18.    Mattie Santana, [facility] Triage/Med Refill 6/11/2019     Requested Prescriptions   Pending Prescriptions Disp Refills     losartan-hydrochlorothiazide (HYZAAR) 100-12.5 mg per tablet [Pharmacy Med Name: LOSARTAN/HCTZ 100/12.5MG TABLETS] 90 tablet 0     Sig: TAKE 1 TABLET BY MOUTH EVERY DAY       Diuretics/Combination Diuretics Refill Protocol  Passed - 6/10/2019  8:18 AM        Passed - Visit with PCP or prescribing provider visit in past 12 months     Last office visit with prescriber/PCP: 5/1/2019 Anisa Bush CNP OR same dept: 5/1/2019 Anisa Bush CNP OR same specialty: 5/1/2019 Anisa Bush CNP  Last physical: 10/30/2018 Last MTM visit: Visit date not found   Next visit within 3 mo: Visit date not found  Next physical within 3 mo: Visit date not found  Prescriber OR PCP: Anisa Bush CNP  Last diagnosis associated with med order: 1. Hypertension  - losartan-hydrochlorothiazide (HYZAAR) 100-12.5 mg per tablet [Pharmacy Med Name: LOSARTAN/HCTZ 100/12.5MG TABLETS]; TAKE 1 TABLET BY MOUTH EVERY DAY  Dispense: 90 tablet; Refill: 0    If protocol passes may refill for 12 months if within 3 months of last provider visit (or a total of 15 months).             Passed - Serum Potassium in past 12 months      Lab Results   Component Value Date    Potassium 4.4 05/01/2019             Passed - Serum Sodium in past 12 months      Lab Results   Component Value Date    Sodium 141 05/01/2019             Passed - Blood pressure on file in past 12 months     BP Readings from Last 1 Encounters:   05/01/19 130/80             Passed - Serum Creatinine in past 12 months      Creatinine   Date Value Ref Range Status   05/01/2019 1.04 0.70 - 1.30 mg/dL Final

## 2021-05-29 NOTE — TELEPHONE ENCOUNTER
Spoke with patient to see if he has tried the Strattera yet.  Patient states it was decided that he would continue the adderall rather than changing to Straterra.     Patient reports his eye symptoms have completely resolved despite continued use of Adderall and is requesting a refill is sent to the pharmacy.    Per 5/1/2019 OV note:   1. Attention deficit hyperactivity disorder (ADHD), combined type  2. Episodic mydriasis of right eye  Patient has had episodic mydriasis.  This can be a side effect of Adderall.  Will discontinue Adderall.  I encouraged him to avoid medications for ADHD for 1 to 2 weeks to see if the mydriasis improves.  I did provide prescription for Strattera 40 mg daily if he would like to replace the Adderall.  He will notify me of any improvement via my chart.  If no improvement, he does plan on restarting Adderall.  - atomoxetine (STRATTERA) 40 MG capsule; Take 1 capsule (40 mg total) by mouth daily.  Dispense: 30 capsule; Refill: 0            Controlled Substance Refill Request  Medication:   Requested Prescriptions      No prescriptions requested or ordered in this encounter     Date Last Fill: 4/26/2019  Pharmacy: [facility] [place]   Submit electronically to pharmacy  Controlled Substance Agreement on File:   Encounter-Level CSA Scan Date:    There are no encounter-level csa scan date.       Last office visit: 5/1/2019 Anisa Bush, CNP

## 2021-05-29 NOTE — TELEPHONE ENCOUNTER
Medication Request  Medication name:     dextroamphetamine-amphetamine (ADDERALL XR) 30 MG 24 hr capsule (Discontinued) 30 capsule 0 3/28/2019 4/26/2019 --   Sig - Route: Take 1 capsule (30 mg total) by mouth daily. - Oral   Sent to pharmacy as: dextroamphetamine-amphetamine (ADDERALL XR) 30 MG 24 hr capsule   Earliest Fill Date: 3/28/2019   Reason for Discontinue: Reorder   E-Prescribing Status: Receipt confirmed by pharmacy (3/28/2019  3:49 PM CDT)       Pharmacy Name and Location: [facility]  Reason for request: The patient is going to run out of his current adderall prescription and would like to have the adderall  When did you use medication last?:  5/27/2017  Patient offered appointment:  patient declined  Okay to leave a detailed message: yes

## 2021-05-30 ENCOUNTER — RECORDS - HEALTHEAST (OUTPATIENT)
Dept: ADMINISTRATIVE | Facility: CLINIC | Age: 51
End: 2021-05-30

## 2021-05-30 VITALS — BODY MASS INDEX: 35.05 KG/M2 | WEIGHT: 258.8 LBS | HEIGHT: 72 IN

## 2021-05-30 VITALS — BODY MASS INDEX: 35.26 KG/M2 | WEIGHT: 260 LBS

## 2021-05-30 NOTE — TELEPHONE ENCOUNTER
I will provide a supply for two weeks of gabapentin.  Patient needs to be seen for any additional refills.

## 2021-05-30 NOTE — TELEPHONE ENCOUNTER
"Pt reports \"history of ear fullness from wax accumulation.\"  Also history of sinus infections.  Some nasal congestion currently.  No coughs or fevers.  Pt feels well -> states \"Just the ear fullness like in the past from wax.\"    Pt agrees to clinical eval to rule out sinus infex, as well as for ear flush procedure.  Warm transferred to a  for this purpose now.    Nighat Parker RN BSBA  [facility] RN Triage     Reason for Disposition    Patient wants to be seen    Protocols used: [id number]-[id number]      "

## 2021-05-30 NOTE — TELEPHONE ENCOUNTER
I called and spoke with pt. We have not seen him x 1 year. Per our clinic protocol he will need to be seen prior to getting refills.     Pt stated that he decreased Gabapentin 300 mg 1 cap two times a day to only 1 cap daily now. Doing well with 1 cap daily. He has been out of Gabapentin x a couple days and is hoping he can get refilled.    Transferred pt to  but no one was available since we are short staff at the  today. Informed pt I will message  to call him back to schedule a F/U with ANY spine provider since Silver is no longer here. Pt verbally understood.    I cannot guarantee that he will get refilled on Gabapentin since it's been over 1 year since we saw him. It's up to the spine specialist.

## 2021-05-30 NOTE — TELEPHONE ENCOUNTER
Controlled Substance Refill Request  Medication:   Requested Prescriptions     Pending Prescriptions Disp Refills     dextroamphetamine-amphetamine (ADDERALL XR) 30 MG 24 hr capsule 30 capsule 0     Sig: Take 1 capsule (30 mg total) by mouth daily.     Date Last Fill: 6/27/19  [id number]   Submit electronically to pharmacy  Controlled Substance Agreement on File:   Encounter-Level CSA Scan Date:    There are no encounter-level csa scan date.       Last office visit: Last office visit pertaining to requested medication was 7/1/19.

## 2021-05-30 NOTE — PATIENT INSTRUCTIONS - HE
Discussed the importance of core strengthening, ROM, stretching exercises with the patient and how each of these entities is important in decreasing pain.  Explained to the patient that the purpose of physical therapy is to teach the patient a home exercise program.  These exercises need to be performed every day in order to decrease pain and prevent future occurrences of pain.        ~Please call Nurse Navigation line [phone] with any questions or concerns about your treatment plan, if symptoms worsen and you would like to be seen urgently, or if you have problems controlling bladder and bowel function.  ~Follow Up Appointment time slots with Ursula Packer CNP with the [facility], are also available at the [facility] location near [facility][1] on the first and third THURSDAY afternoons of each month.

## 2021-05-30 NOTE — TELEPHONE ENCOUNTER
Controlled Substance Refill Request  Medication:   Requested Prescriptions     Pending Prescriptions Disp Refills     dextroamphetamine-amphetamine (ADDERALL XR) 30 MG 24 hr capsule 30 capsule 0     Sig: Take 1 capsule (30 mg total) by mouth daily.     Date Last Fill: 6/27/19  [facility]  Submit electronically to pharmacy  Controlled Substance Agreement on File:   Encounter-Level CSA Scan Date:    There are no encounter-level csa scan date.       Last office visit: 5/1/2019 Anisa Bush, CNP

## 2021-05-30 NOTE — TELEPHONE ENCOUNTER
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 Stent placement, R subclavian vein (12x40 Bard)  2. PTA, R subclavian vein (12x40 [place])  3. R fistalugram and CENTRAL VENOGRAM  4. Conscious Sedation     Anesthesia: Local MAC     Description of the findings of the procedure: Recurrent >90% central stenosis, > 50% after PTA, <10% after stent with resolution of venous collateral filling     Findings/Key Components: as above     Estimated Blood Loss: <5cc    Implants:   Implant Name Type Inv. Item Serial No.  Lot No. LRB No. Used   STENT LIFESTAR [id number] - [id number] Self Expanding Stent STENT LIFESTAR [id number]  C.R. BARD [id number] Right 1            Complications:  None; patient tolerated the procedure well.           Disposition: Recovery.           Condition: stable    Procedure Details:  The patient was brought to the Cath Lab, placed in supine. Right arm was prepped and draped in the standard surgical fashion. Under ultrasound guidance, the proximal aspect of the right upper arm AV graft was accessed with a micropuncture needle; ultrasound confirmed vessel patency, followed by placement of 4/3-New Zealander micropuncture dilator. Fistulogram and central venogram was performed and revealed recurrent >90% stenosis of right subclavian vein just distal to the prior innominate stent. Through this, an 0.035-inch wire was placed in the short 6-[place]ander sheath. An 0.035-inch Glidewire was placed through the high-grade stenosis, which was demonstrated by the angiogram. This was treated with [id number] Dayton balloon (18 cindy 2 min). Persistent stenosis noted. [id number] Lifestar stent was then deployed over the stenosis and post-dilated with [id number] [place] balloon. Resolution of the stenosis was  I spoke to pt and informed refill x 2 weeks was provider by Mi. Again, he will need a F/U appt since we have not seen him x 1 year.  staffing was short today so they probably didn't get a chance to call him back to schedule a F/U. However, if he does not hear from anyone tomorrow then I recommend he calls back to schedule a F/U with any other spine provider since Silver is no longer here. Pt verbally understood.    noted and collaterals were no longer filling. Strong thrill could be felt. The sheath was removed, 3-0 nylon U-stitch was placed with good hemostasis.    MODERATE SEDATION IN CATH LAB   KARMA Wells III, MD was present for moderate sedation  KARMA Wells III, MD monitored the patients cardio respiratory functions during the moderate sedation   See nurses notes for Intra-service start and end times and for the log of the name of the RN who administered the medicines for the moderate sedation, including their doseage and route.    Time of sedation:  45 mins.    Robbie Zambrano MD  Vascular/Endovascular Surgery Fellow

## 2021-05-30 NOTE — PROGRESS NOTES
Patient presents to clinic for left ear lavage status post manually impacted cerumen from earplugs.  Patient has had ear flushes in the past.  He does not use Q-tips.  He is having decreased hearing on the left side.  He is not endorsing any pain.    When inspected, the left tympanic membrane was obscured by impacted cerumen.  The right tympanic membrane was clear.    Lavage performed on the left to patient's satisfaction.

## 2021-05-30 NOTE — TELEPHONE ENCOUNTER
Controlled Substance Refill Request  Medication:   Requested Prescriptions     Pending Prescriptions Disp Refills     dextroamphetamine-amphetamine (ADDERALL XR) 30 MG 24 hr capsule 30 capsule 0     Sig: Take 1 capsule (30 mg total) by mouth daily.     Date Last Fill: 5/29/19  Pharmacy: [facility] in [place], [place]   Submit electronically to pharmacy  Controlled Substance Agreement on File: 2/8/19  Encounter-Level CSA Scan Date:    There are no encounter-level csa scan date.       Last office visit: 5/1/2019 Anisa Bush, CNP

## 2021-05-30 NOTE — PROGRESS NOTES
Assessment:     Diagnoses and all orders for this visit:    Left lumbar radiculitis  -     OPS TFESI Lumbar Sacral Unilateral; Future; Expected date: 07/24/2019    Chronic bilateral low back pain with bilateral sciatica  -     OPS TFESI Lumbar Sacral Unilateral; Future; Expected date: 07/24/2019    Foraminal stenosis of lumbar region  -     OPS TFESI Lumbar Sacral Unilateral; Future; Expected date: 07/24/2019    Lumbar radiculopathy  -     gabapentin (NEURONTIN) 300 MG capsule; Take 2 capsules (600 mg total) by mouth 3 (three) times a day.  Dispense: 180 capsule; Refill: 3    Lumbalgia  -     gabapentin (NEURONTIN) 300 MG capsule; Take 2 capsules (600 mg total) by mouth 3 (three) times a day.  Dispense: 180 capsule; Refill: 3    Spondylosis of cervical region without myelopathy or radiculopathy  -     gabapentin (NEURONTIN) 300 MG capsule; Take 2 capsules (600 mg total) by mouth 3 (three) times a day.  Dispense: 180 capsule; Refill: 3    Cervical radicular pain  -     gabapentin (NEURONTIN) 300 MG capsule; Take 2 capsules (600 mg total) by mouth 3 (three) times a day.  Dispense: 180 capsule; Refill: 3       Rex Bond Jr. is a 49 y.o. y.o. male patient of JACOBY Barba last seen 5/2018, with past medical history significant for Crohn's disease, gout, hypertension, diverticulosis, obesity, hyperlipidemia, anxiety, pancreatic cancer, rectal abscess, history lumbar spine surgery who presents today for follow-up regarding:    -Chronic bilateral low back pain with bilateral radiculitis left greater than right L3 dermatomal pattern.  MRI does show L3-4 foraminal stenosis bilaterally.    Patient is neurologically intact on exam.    Plan:     A shared decision making plan was used. The patient's values and choices were respected. Prior medical records from 5/25/2018 to current were reviewed today. The following represents what was discussed and decided upon by the provider and the patient.        -DIAGNOSTIC  TESTS: Images were personally reviewed and interpreted.   --Could consider updated lumbar spine MRI down the road if symptoms are not improving with injection.  Currently his pain is chronic and unchanged and he is neurologically intact on exam.  --Lumbar spine MRI 5/17/2018 shows L3-4 disc protrusion with right L4 nerve root compression with severe right foraminal stenosis.  L4-5 severe bilateral facet arthropathy with mild central and mild bilateral foraminal stenosis.  Moderate facet arthropathy L3-4 and L5-S1.  --Cervical spine MRI 5/17/2018 shows congenital spinal canal stenosis with degenerative changes resulting in multilevel neuroforaminal stenosis it is severe on the right at C4-5 and on the left at C3-4.    -INTERVENTIONS: Ordered left L3-4 transforaminal epidural steroid injection see if we get further relief of his left lumbar radiculitis that is most significant and L3 dermatomal pattern into the medial thigh.  He does have moderate foraminal stenosis on the left at L3-4.    -MEDICATIONS: Refilled gabapentin 300 mg, did discuss that he could increase his medication to 2 tablets 3 times daily for radicular pain, he is hoping to only have to increase it at nighttime to 3 tablets but will see if he needs it during the daytime.  Discussed side effects of medications and proper use. Patient verbalized understanding.    -PHYSICAL THERAPY: Encourage patient continue with home exercises from prior physical therapy sessions which he is diligent about doing as well at this time.  He is hoping to avoid further physical therapy but is understanding that his insurance may mandate that he does physical therapy prior to the injection.  Discussed the importance of core strengthening, ROM, stretching exercises with the patient and how each of these entities is important in decreasing pain.  Explained to the patient that the purpose of physical therapy is to teach the patient a home exercise program.  These exercises  need to be performed every day in order to decrease pain and prevent future occurrences of pain.        -PATIENT EDUCATION:  40 minutes of total visit time was spent face to face with the patient today, 60 % of the visit was spent on counseling, education, and coordinating care.   -5 minutes spent outside of visit time, non-face-to-face time, reviewing chart.    -FOLLOW UP: Follow-up for injection with Dr. Soto in 2 weeks postinjection with KS.  Advised to contact clinic if symptoms worsen or change.    Subjective:     Rex Bond Jr. is a 49 y.o. male who presents today for follow-up regarding ongoing chronic bilateral low back pain with radicular pain left greater than right most significant into the medial thigh that is worse at nighttime with rest, does improve with activities and when he is active he states.  Currently his pain is a 6/10, does get up to a 10 at its worst, a 5 at its best.  Patient denies numbness or tingling sensations, denies weakness or recent trips or falls, denies bowel or bladder loss control.    Treatment to Date: History lumbar spine surgery.  Physical therapy in the past, patient is continuing home exercises on a daily basis with minimal benefit.    Medications:  Gabapentin 300 mg, 1 daily    Patient Active Problem List   Diagnosis     Serum Enzyme Levels - ALT (SGPT) Elevated     Crohn's Disease     Cramp Of Limb     Esophageal Reflux     Acute Gout     Gout     Hypertension     Herniated Intervertebral Disc     Diverticulosis     Soft Tissue Neoplasm Of The Abdomen     Testicular Neoplasm     Bright Red Blood Per Rectum     Arthropathy Of The Ankle / Foot     Maisha-rectal abscess     Diverticulosis of intestine without bleeding     Internal hemorrhoids     Chewing tobacco use     Obesity     Hyperlipidemia     Anxiety     Furuncle of forehead     Obesity (BMI 35.0-39.9) with comorbidity (H)       Current Outpatient Medications on File Prior to Encounter   Medication Sig  Dispense Refill     [DISCONTINUED] gabapentin (NEURONTIN) 300 MG capsule Take 1 capsule (300 mg total) by mouth daily. 14 capsule 0     allopurinol (ZYLOPRIM) 300 MG tablet TAKE 1 TABLET(300 MG) BY MOUTH TWICE DAILY 180 tablet 3     dextroamphetamine-amphetamine (ADDERALL XR) 30 MG 24 hr capsule Take 1 capsule (30 mg total) by mouth daily. 30 capsule 0     losartan-hydrochlorothiazide (HYZAAR) 100-12.5 mg per tablet TAKE 1 TABLET BY MOUTH EVERY DAY 90 tablet 3     omeprazole (PRILOSEC) 20 MG capsule TAKE 1 CAPSULE BY MOUTH EVERY DAY 90 capsule 2     venlafaxine (EFFEXOR XR) 37.5 MG 24 hr capsule Take 1 capsule (37.5 mg total) by mouth daily. May increase to 2 capsules by mouth once daily after 2 weeks. 60 capsule 0     venlafaxine (EFFEXOR-XR) 75 MG 24 hr capsule TAKE 1 CAPSULE(75 MG) BY MOUTH DAILY 30 capsule 0     [DISCONTINUED] gabapentin (NEURONTIN) 300 MG capsule Take 1 capsule (300 mg total) by mouth 3 (three) times a day. (Patient taking differently: Take 300 mg by mouth daily. ) 180 capsule 0     No current facility-administered medications on file prior to encounter.        No Known Allergies    Past Medical History:   Diagnosis Date     Anxiety      Crohn disease (H)      Diverticulosis      Esophageal reflux      Gout      Herniated disc      HTN (hypertension)      Hyperlipidemia      Hypokalemia      Motion sickness      Pancreatic cancer (H)     mass removed at [facility] 4/2012        Review of Systems  ROS: Positive for increased pain at nighttime.  Specifically negative for bowel/bladder dysfunction, balance changes, headache, dizziness, foot drop, fevers, chills, appetite changes, nausea/vomiting, unexplained weight loss. Otherwise 13 systems reviewed are negative. Please see the patient's intake questionnaire from today for details.    Reviewed Social, Family, Past Medical and Past Surgical history with patient, no significant changes noted since prior visit.     Objective:     /84 (Patient Site:  Right Arm, Patient Position: Sitting)   Pulse (!) 102   Wt (!) 234 lb (106.1 kg)   SpO2 99%   BMI 31.74 kg/m      PHYSICAL EXAMINATION:    --CONSTITUTIONAL: Well developed, well nourished, healthy appearing individual.  --PSYCHIATRIC: Appropriate mood and affect. No difficulty interacting due to temper, social withdrawal, or memory issues.  --SKIN: Lumbar region is dry and intact.   --RESPIRATORY: Normal rhythm and effort. No abnormal accessory muscle breathing patterns noted.   --MUSCULOSKELETAL:  Normal lumbar lordosis noted, no lateral shift.  --GROSS MOTOR: Easily arises from a seated position. Gait is non-antalgic  --LUMBAR SPINE:  Inspection reveals no evidence of deformity. Range of motion is not limited in lumbar flexion, extension, lateral rotation. No tenderness to palpation lumbar spine. Straight leg raising in the supine position is negative to radicular pain on the right, positive on the left. Sciatic notch non-tender.   --SACROILIAC JOINT: One Finger point test negative.  --LOWER EXTREMITY MOTOR TESTING:  Plantar flexion left 5/5, right 5/5   Dorsiflexion left 5/5, right 5/5   Great toe MTP extension left 5/5, right 5/5  Knee flexion left 5/5, right 5/5  Knee extension left 5/5, right 5/5   Hip flexion left 5/5, right 5/5  Hip abduction left 5/5, right 5/5  Hip adduction left 5/5, right 5/5   --HIPS: Full range of motion bilaterally. Negative FABERs on the involved lower extremity.  Negative scour maneuver bilaterally.  --NEUROLOGIC: Bilateral patellar and achilles reflexes are physiologic and symmetric. Sensation to light touch is intact in the bilateral L4, L5, and S1 dermatomes.    RESULTS:   Imaging: Lumbar spine imaging was reviewed today. The images were shown to the patient and the findings were explained using a spine model.      MR CERVICAL SPINE WO CONTRAST  MR LUMBAR SPINE WO CONTRAST  5/17/2018  INDICATION: Neck pain with radiation into bilateral arms, left hand numbness cervical  radiculitis in the c8 nerve dermatome  TECHNIQUE: Without IV contrast.  CONTRAST: None  COMPARISON: None.  FINDINGS:  MRI CERVICAL SPINE:  Normal vertebral body heights, alignment and marrow signal. Normal appearance of the craniocervical junction and C1-C2. No abnormal cord signal. The visualized intracranial contents are unremarkable. Grossly normal paraspinal soft tissues. The vertebral artery flow voids are preserved.  C2-C3: Normal disc height. No herniation. Moderate right and no left facet arthropathy. No spinal canal stenosis. No right neural foraminal stenosis. No left neural foraminal stenosis.  C3-C4: Normal disc height. Small central disc protrusion. Mild to moderate bilateral facet arthropathy. Mild spinal canal stenosis. Moderate to severe right neural foraminal stenosis. Severe left neural foraminal stenosis.  C4-C5: Normal disc height. Central disc osteophyte complex. Moderate to severe bilateral facet arthropathy. Mild to moderate spinal canal stenosis. Severe right neural foraminal stenosis. Mild to moderate left neural foraminal stenosis.    C5-C6: Normal disc height. Central disc osteophyte complex. Severe bilateral facet arthropathy. Mild to moderate spinal canal stenosis. Moderate right neural foraminal stenosis. Moderate to severe left neural foraminal stenosis.  C6-C7: Normal disc height. Central disc osteophyte complex. Mild bilateral facet arthropathy. Mild to moderate spinal canal stenosis. Mild right neural foraminal stenosis. Mild left neural foraminal stenosis.  C7-T1: Normal disc height. No herniation. Mild bilateral facet arthropathy. Mild to moderate spinal canal stenosis. No right neural foraminal stenosis. No left neural foraminal stenosis.  MRI LUMBAR SPINE:  Nomenclature is based on 5 lumbar type vertebral bodies. Normal vertebral body heights. Mild retrolisthesis of L3 on L4. There is a T1-weighted hypointense lesion in the posterior right aspect of the L3 inferior endplate  which is hyperintense on STIR. The morphologic appearance is most suggestive of a degenerative Schmorl's node. No suspicious bone marrow signal abnormality identified. No pars defect. The conus tip is identified at L1. T2-weighted hyperintense lesion in the left kidney may represent a renal cyst. No abdominal aortic aneurysm. The visualized portions of the bony pelvis are normal for age.  T12-L1: Normal disc height and signal. No herniation. No facet arthropathy. No spinal canal stenosis. No right neural foraminal stenosis. No left neural foraminal stenosis.  L1-L2: Normal disc height. Mild loss of T2-weighted signal in the disc. Mild disc bulge. Mild bilateral facet arthropathy. Mild spinal canal stenosis. No right neural foraminal stenosis. No left neural foraminal stenosis.  L2-L3: Normal disc height. Mild loss of T2-weighted signal in the disc. Moderate disc bulge which contacts and may mildly compress the traversing left L3 nerve root. It contacts but does not definitely compress the traversing right L3 nerve root. Moderate bilateral facet arthropathy. Mild to moderate spinal canal stenosis. No right neural foraminal stenosis. No left neural foraminal stenosis.  L3-L4: Mild to moderate loss of disc height and moderate loss of T2-weighted signal in the disc. Central annular fissure. Moderate disc bulge. Right subarticular/foraminal disc protrusion which contacts and posteriorly displaces the traversing right L4 nerve root. Moderate bilateral facet arthropathy. Mild spinal canal stenosis. The facet hypertrophy contacts and compresses the foraminal portion of the exiting right L3 nerve root within the neural foramen. There is severe right neural foraminal stenosis. Mild to moderate left neural foraminal stenosis.  L4-L5: Moderate to severe loss of disc height and T2-weighted signal in the disc. L4 laminectomy. Moderate disc bulge with severe narrowing of the bilateral lateral recesses. Severe bilateral facet  arthropathy. Mild spinal canal stenosis. Mild right neural foraminal stenosis. Mild left neural foraminal stenosis.  L5-S1: Normal disc height. Mild loss of T2-weighted signal in the disc. Small central disc protrusion. Moderate bilateral No facet arthropathy. No spinal canal stenosis. No right neural foraminal stenosis. No left neural foraminal stenosis.  CONCLUSION:  MRI CERVICAL SPINE:  1.  There is mild diffuse congenital spinal canal stenosis with superimposed degenerative changes.  2.   At C3-C4, there is moderate to severe right and severe left neural foraminal stenosis.  3.  At C4-C5, there is severe right neural foraminal stenosis.  4.  At C5-C6, there is moderate right and moderate to severe left neural foraminal stenosis.  5.  At C7-T1, there is mild to moderate spinal canal stenosis.   MRI LUMBAR SPINE:   1.  At L3-L4, there is a right subarticular/foraminal disc protrusion which contacts and posteriorly displaces the traversing right L4 nerve root. Moderate facet arthropathy contacts and compresses the foraminal portion of the exiting right L3 nerve root within the neural foramen with associated severe right neural foraminal stenosis.  2.  At L4-L5, there is severe bilateral facet arthropathy.  3.  Moderate disc bulge with a left subarticular

## 2021-05-31 VITALS — HEIGHT: 72 IN | WEIGHT: 268.5 LBS | BODY MASS INDEX: 36.37 KG/M2

## 2021-05-31 VITALS — BODY MASS INDEX: 35.53 KG/M2 | WEIGHT: 262 LBS

## 2021-05-31 NOTE — PATIENT INSTRUCTIONS - HE
Follow-up visit with Ursula Packer in 2 weeks to discuss injection outcome and determine care plan going forward.       DISCHARGE INSTRUCTIONS    During office hours (8:00 a.m.- 4:30 p.m.) questions or concerns may be answered  by calling Spine Navigation Nurses at  [phone].     If you experience any problems after hours  please call [phone] and you will be connected to [facility].     All Patients:    ? You may experience an increase in your symptoms for the first 2 days (It may take anywhere between 2 days- 2 weeks for the steroid to have maximum effect).    ? You may use ice on the injection site, as frequently as 20 minutes each hour if needed.    ? You may take your pain medicine.    ? You may continue taking your regular medication after your injection. If you have had a Medial Branch Block you may resume pain medication once your pain diary is completed.    ? You may shower. No swimming, tub bath or hot tub for 48 hours.  You may remove your bandaid/bandage as soon as you are home.    ? You may resume light activities, as tolerated.    ? Resume your usual diet as tolerated.    ? It is strongly advised that you do not drive for 1-3 hours post injection.    ? If you have had oral sedation:  Do not drive for 8 hours post injection.      ? If you have had IV sedation:  Do not drive for 24 hours post injection.  Do not operate hazardous machinery or make important personal/business decisions for 24 hours.      POSSIBLE STEROID SIDE EFFECTS (If steroid/cortisone was used for your procedure)    -If you experience these symptoms, it should only last for a short period      Swelling of the legs                Skin redness (flushing)       Mouth (oral) irritation     Blood sugar (glucose) levels              Sweats                      Mood changes    Headache    Sleeplessness         POSSIBLE PROCEDURE SIDE EFFECTS  -Call the [facility] Center if you are concerned    Increased Pain              Increased numbness/tingling        Nausea/Vomiting            Bruising/bleeding at site        Redness or swelling                                                Difficulty walking        Weakness             Fever greater than 100.5    *In the event of a severe headache after an epidural steroid injection that is relieved by lying down, please call the [facility] to speak with a clinical staff member*

## 2021-05-31 NOTE — TELEPHONE ENCOUNTER
"Patient calling to report he had an injection last Friday and continues to have bad back spasms. States they are worse than before the injection. \"The spasm comes, I get sweaty and then I feel a little bit nauseated.\" Encouraged patient to drink plenty of fluids. Pharmacy verified.   "

## 2021-05-31 NOTE — PATIENT INSTRUCTIONS - HE
Discussed the importance of core strengthening, ROM, stretching exercises with the patient and how each of these entities is important in decreasing pain.  Explained to the patient that the purpose of physical therapy is to teach the patient a home exercise program.  These exercises need to be performed every day in order to decrease pain and prevent future occurrences of pain.        ~Please call Nurse Navigation line [phone] with any questions or concerns about your treatment plan, if symptoms worsen and you would like to be seen urgently, or if you have problems controlling bladder and bowel function.  ~Follow Up Appointment time slots with Ursula Packer CNP with the [facility], are also available at the [facility] location near [facility] on the first and third THURSDAY afternoons of each month.

## 2021-05-31 NOTE — PROGRESS NOTES
Assessment:     Diagnoses and all orders for this visit:    Chronic bilateral low back pain with bilateral sciatica  -     oxyCODONE-acetaminophen (PERCOCET/ENDOCET) 5-325 mg per tablet; Take 1 tablet by mouth 2 (two) times a day as needed for pain (Severe pain).  Dispense: 10 tablet; Refill: 0    Left lumbar radiculitis  -     oxyCODONE-acetaminophen (PERCOCET/ENDOCET) 5-325 mg per tablet; Take 1 tablet by mouth 2 (two) times a day as needed for pain (Severe pain).  Dispense: 10 tablet; Refill: 0    Foraminal stenosis of lumbar region    Muscle spasm    Cervicalgia       Rex Bond . is a 49 y.o. y.o. male with past medical history significant for Crohn's disease, gout, hypertension, diverticulosis, obesity, hyperlipidemia, anxiety, pancreatic cancer, rectal abscess, history lumbar spine surgery who presents today for follow-up regarding:    -Chronic bilateral low back pain with bilateral lumbar radiculitis L5 dermatomal pattern with associated paresthesias, 50% short-lived relief with L3-4 TFESI.  Patient is one-week post bilateral L4-5 TFESI with 50% diagnostic initial response.    -Lumbar muscle spasms postinjection.    -Generalized muscle shakiness.    -Chronic less intense cervicalgia.    Patient is neurologically intact upper and lower extremities, and no red flag or myelopathic symptoms.     Plan:     A shared decision making plan was used. The patient's values and choices were respected. Prior medical records from 8/22/19 were reviewed today. The following represents what was discussed and decided upon by the provider and the patient.        -DIAGNOSTIC TESTS: Images were personally reviewed and interpreted.   --Discussed with patient that if his low back is not improving 2 weeks postinjection would recommend updated lumbar spine MRI.  --If neck pain is not improving consider updated cervical spine MRI.  --Lumbar spine MRI 5/17/2018 shows L3-4 disc protrusion with right L4 nerve root compression  with severe right foraminal stenosis.  L4-5 severe bilateral facet arthropathy with mild central and mild bilateral foraminal stenosis.  Moderate facet arthropathy L3-4 and L5-S1.  --Cervical spine MRI 5/17/2018 shows congenital spinal canal stenosis with degenerative changes resulting in multilevel neuroforaminal stenosis it is severe on the right at C4-5 and on the left at C3-4.    -INTERVENTIONS: Patient is one-week post bilateral L4-5 TFESI from 8/23/2019.  50% initial diagnostic relief.    -MEDICATIONS: Flexeril 5 mg 1 to 2 tablets 3 times daily as needed prescribed today and sent to pharmacy for back spasms.  -Prescribed Percocet 5/325 mg 1 tablet twice daily as needed for severe breakthrough pain, number 10 tablets given for 5 days worth.  -MN  checked. Discussed the risks (eg, addiction, overdose, worsening pain) verses benefit of opioid use with patient today. Explained that this medication will not be a long term solution to ongoing pain. Discussed using lowest effective dose and the importance of other measures for pain management including PT, other non-opioid medications, behavioral treatments, and other procedure options.   -Did advise patient to decrease gabapentin 300 mg to 2 tablets 3 times daily, if shakiness has not improved then I would recommend decreasing to 1 tablet 3 times daily which is the dose he was on previously.  Back spasms more likely causing the shakiness and related to the injection and should improve in the next couple of days.  Discussed side effects of medications and proper use. Patient verbalized understanding.    -PHYSICAL THERAPY: Encourage patient continue with home exercises from prior physical therapy sessions.  Discussed the importance of core strengthening, ROM, stretching exercises with the patient and how each of these entities is important in decreasing pain.  Explained to the patient that the purpose of physical therapy is to teach the patient a home exercise  program.  These exercises need to be performed every day in order to decrease pain and prevent future occurrences of pain.        -PATIENT EDUCATION:  25 minutes of total visit time was spent face to face with the patient today, 60 % of the visit was spent on counseling, education, and coordinating care.   -5 minutes spent outside of visit time, non-face-to-face time, reviewing chart.    -FOLLOW UP: Follow-up in 1 week for 2 weeks postinjection follow-up.  Advised to contact clinic if symptoms worsen or change.    Subjective:     Rex Bond Jr. is a 49 y.o. male who presents today for follow-up regarding ongoing chronic bilateral low back pain that radiates to the lower extremities posterior lateral thigh posterior lateral calf/shin with associated numbness and tingling in which she received short-term relief 50% relief with bilateral L4-5 TFESI however he is only 1 week post injection today.  Currently he does report that his back pain in general during the daytime is about 80% improved with the injection, he does have however newer symptoms in regards to muscle spasms that start in his back and radiate up to his neck in the evening hours is most bothersome and some shakiness throughout the day as well but that is also new.  He also reports some nausea when the back spasms occur and difficulty sleeping at nighttime due to jitteriness.    Patient denies any upper or lower extremely weakness, denies increasing numbness and tingling sensations, denies recent trips falls or balance changes, denies bowel or bladder loss control, denies saddle anesthesia.    Treatment to Date: History lumbar spine surgery.  Physical therapy in the past, patient is continuing home exercises on a daily basis with minimal benefit.     Left L3-4 TFESI 8/9/2019 with 50% initial relief only.  Preprocedure pain 6/10, post 0/10.  Bilateral L4-5 TFESI 8/23/2019 with 50% initial, 80% relief 1 week postinjection.  Preprocedure pain 4/10, post  2/10.  Side effect of new back spasms however.     Medications:  Gabapentin 300 mg 2 twice daily with minimal benefit, no side effect.    Patient Active Problem List   Diagnosis     Serum Enzyme Levels - ALT (SGPT) Elevated     Crohn's Disease     Cramp Of Limb     Esophageal Reflux     Acute Gout     Gout     Hypertension     Herniated Intervertebral Disc     Diverticulosis     Soft Tissue Neoplasm Of The Abdomen     Testicular Neoplasm     Bright Red Blood Per Rectum     Arthropathy Of The Ankle / Foot     Maisha-rectal abscess     Diverticulosis of intestine without bleeding     Internal hemorrhoids     Chewing tobacco use     Obesity     Hyperlipidemia     Anxiety     Furuncle of forehead     Obesity (BMI 35.0-39.9) with comorbidity (H)       Current Outpatient Medications on File Prior to Encounter   Medication Sig Dispense Refill     cyclobenzaprine (FLEXERIL) 5 MG tablet Take 1-2 tablets (5-10 mg total) by mouth 3 (three) times a day as needed for muscle spasms. 42 tablet 0     gabapentin (NEURONTIN) 300 MG capsule Take 2 capsules (600 mg total) by mouth 3 (three) times a day. 180 capsule 3     allopurinol (ZYLOPRIM) 300 MG tablet TAKE 1 TABLET(300 MG) BY MOUTH TWICE DAILY 180 tablet 3     dextroamphetamine-amphetamine (ADDERALL XR) 30 MG 24 hr capsule Take 1 capsule (30 mg total) by mouth daily. 30 capsule 0     [] HYDROcodone-acetaminophen 5-325 mg per tablet Take 1 tablet by mouth 2 (two) times a day as needed for pain. 14 tablet 0     losartan-hydrochlorothiazide (HYZAAR) 100-12.5 mg per tablet TAKE 1 TABLET BY MOUTH EVERY DAY 90 tablet 3     omeprazole (PRILOSEC) 20 MG capsule TAKE 1 CAPSULE BY MOUTH EVERY DAY 90 capsule 2     venlafaxine (EFFEXOR-XR) 150 MG 24 hr capsule Take 1 capsule (150 mg total) by mouth daily. 90 capsule 0     No current facility-administered medications on file prior to encounter.        No Known Allergies    Past Medical History:   Diagnosis Date     Anxiety      Crohn  disease (H)      Diverticulosis      Esophageal reflux      Gout      Herniated disc      HTN (hypertension)      Hyperlipidemia      Hypokalemia      Motion sickness      Pancreatic cancer (H)     mass removed at [place] 4/2012        Review of Systems  ROS: Positive for numbness and tingling and weakness generalized, headache, increased back spasms at nighttime.  Specifically negative for bowel/bladder dysfunction, balance changes, headache, dizziness, foot drop, fevers, chills, appetite changes, nausea/vomiting, unexplained weight loss. Otherwise 13 systems reviewed are negative. Please see the patient's intake questionnaire from today for details.    Reviewed Social, Family, Past Medical and Past Surgical history with patient, no significant changes noted since prior visit.     Objective:     BP (!) 148/96 (Patient Site: Right Arm, Patient Position: Sitting)   Pulse 98   SpO2 100%     PHYSICAL EXAMINATION:    --CONSTITUTIONAL: Well developed, well nourished, healthy appearing individual.  --PSYCHIATRIC: Appropriate mood and affect. No difficulty interacting due to temper, social withdrawal, or memory issues.  --SKIN: Lumbar region is dry and intact.   --RESPIRATORY: Normal rhythm and effort. No abnormal accessory muscle breathing patterns noted.   --MUSCULOSKELETAL:  Normal lumbar lordosis noted, no lateral shift.  --GROSS MOTOR: Easily arises from a seated position. Gait is non-antalgic  --LUMBAR SPINE:  Inspection reveals no evidence of deformity. Range of motion is not limited in lumbar flexion, extension, lateral rotation. No tenderness to palpation lumbar spine.   --SACROILIAC JOINT:  One Finger point test negative.  --LOWER EXTREMITY MOTOR TESTING:  Plantar flexion left 5/5, right 5/5   Dorsiflexion left 5/5, right 5/5   Great toe MTP extension left 5/5, right 5/5  Knee flexion left 5/5, right 5/5  Knee extension left 5/5, right 5/5   Hip flexion left 5/5, right 5/5  Hip abduction left 5/5, right 5/5  Hip  adduction left 5/5, right 5/5   --HIPS: Full range of motion bilaterally.   --NEUROLOGIC: Bilateral patellar and achilles reflexes 3/4, sensation to light touch is intact in the bilateral L4, L5, and S1 dermatomes.    CERVICAL:   --HEENT:  Sclera are non-injected.  Extraocular muscles are intact.  Thyroid moves easily upon swallowing.  Moist oral mucosa.  --SKIN:  Skin over the face, bilateral upper extremities, and posterior torso is clean, dry, intact without rashes.  --UPPER EXTREMITY MOTOR TESTING:  Wrist flexion left 5/5, right 5/5  Wrist extension left 5/5, right 5/5  Pronators left 5/5, right 5/5  Biceps left 5/5, right 5/5   Triceps left 5/5, right 5/5   Shoulder abduction left 5/5, right 5/5   left 5/5, right 5/5  --NEUROLOGIC:  CN III-XII are grossly intact.  Bilateral patellar and achilles reflexes are physiologic and symmetric. 2/4 symmetric biceps, brachioradialis, triceps reflexes bilaterally.  Sensation to upper extremities is intact.  Negative Abraham's bilaterally.    --VASCULAR:  2/4 radial pulses bilaterally.  Warm upper limbs bilaterally.  Capillary refill in the upper extremities is less than 1 second. No obvious lower extremity swelling or varicosities.   --CERVICAL SPINE: Inspection reveals no evidence of deformity. Range of motion is not limited in cervical flexion, extension, or lateral rotation. No tenderness to palpation. Spurlings maneuver is negative to radicular pain.      RESULTS:   Imaging: Lumbar spine imaging was reviewed today. The images were shown to the patient and the findings were explained using a spine model.      MR CERVICAL SPINE WO CONTRAST  MR LUMBAR SPINE WO CONTRAST  5/17/2018  INDICATION: Neck pain with radiation into bilateral arms, left hand numbness cervical radiculitis in the c8 nerve dermatome  TECHNIQUE: Without IV contrast.  CONTRAST: None  COMPARISON: None.  FINDINGS:  MRI CERVICAL SPINE:  Normal vertebral body heights, alignment and marrow signal. Normal  appearance of the craniocervical junction and C1-C2. No abnormal cord signal. The visualized intracranial contents are unremarkable. Grossly normal paraspinal soft tissues. The vertebral artery flow voids are preserved.  C2-C3: Normal disc height. No herniation. Moderate right and no left facet arthropathy. No spinal canal stenosis. No right neural foraminal stenosis. No left neural foraminal stenosis.  C3-C4: Normal disc height. Small central disc protrusion. Mild to moderate bilateral facet arthropathy. Mild spinal canal stenosis. Moderate to severe right neural foraminal stenosis. Severe left neural foraminal stenosis.  C4-C5: Normal disc height. Central disc osteophyte complex. Moderate to severe bilateral facet arthropathy. Mild to moderate spinal canal stenosis. Severe right neural foraminal stenosis. Mild to moderate left neural foraminal stenosis.    C5-C6: Normal disc height. Central disc osteophyte complex. Severe bilateral facet arthropathy. Mild to moderate spinal canal stenosis. Moderate right neural foraminal stenosis. Moderate to severe left neural foraminal stenosis.  C6-C7: Normal disc height. Central disc osteophyte complex. Mild bilateral facet arthropathy. Mild to moderate spinal canal stenosis. Mild right neural foraminal stenosis. Mild left neural foraminal stenosis.  C7-T1: Normal disc height. No herniation. Mild bilateral facet arthropathy. Mild to moderate spinal canal stenosis. No right neural foraminal stenosis. No left neural foraminal stenosis.  MRI LUMBAR SPINE:  Nomenclature is based on 5 lumbar type vertebral bodies. Normal vertebral body heights. Mild retrolisthesis of L3 on L4. There is a T1-weighted hypointense lesion in the posterior right aspect of the L3 inferior endplate which is hyperintense on STIR. The morphologic appearance is most suggestive of a degenerative Schmorl's node. No suspicious bone marrow signal abnormality identified. No pars defect. The conus tip is  identified at L1. T2-weighted hyperintense lesion in the left kidney may represent a renal cyst. No abdominal aortic aneurysm. The visualized portions of the bony pelvis are normal for age.  T12-L1: Normal disc height and signal. No herniation. No facet arthropathy. No spinal canal stenosis. No right neural foraminal stenosis. No left neural foraminal stenosis.  L1-L2: Normal disc height. Mild loss of T2-weighted signal in the disc. Mild disc bulge. Mild bilateral facet arthropathy. Mild spinal canal stenosis. No right neural foraminal stenosis. No left neural foraminal stenosis.  L2-L3: Normal disc height. Mild loss of T2-weighted signal in the disc. Moderate disc bulge which contacts and may mildly compress the traversing left L3 nerve root. It contacts but does not definitely compress the traversing right L3 nerve root. Moderate bilateral facet arthropathy. Mild to moderate spinal canal stenosis. No right neural foraminal stenosis. No left neural foraminal stenosis.  L3-L4: Mild to moderate loss of disc height and moderate loss of T2-weighted signal in the disc. Central annular fissure. Moderate disc bulge. Right subarticular/foraminal disc protrusion which contacts and posteriorly displaces the traversing right L4 nerve root. Moderate bilateral facet arthropathy. Mild spinal canal stenosis. The facet hypertrophy contacts and compresses the foraminal portion of the exiting right L3 nerve root within the neural foramen. There is severe right neural foraminal stenosis. Mild to moderate left neural foraminal stenosis.  L4-L5: Moderate to severe loss of disc height and T2-weighted signal in the disc. L4 laminectomy. Moderate disc bulge with severe narrowing of the bilateral lateral recesses. Severe bilateral facet arthropathy. Mild spinal canal stenosis. Mild right neural foraminal stenosis. Mild left neural foraminal stenosis.  L5-S1: Normal disc height. Mild loss of T2-weighted signal in the disc. Small central  disc protrusion. Moderate bilateral No facet arthropathy. No spinal canal stenosis. No right neural foraminal stenosis. No left neural foraminal stenosis.  CONCLUSION:  MRI CERVICAL SPINE:  1.  There is mild diffuse congenital spinal canal stenosis with superimposed degenerative changes.  2.   At C3-C4, there is moderate to severe right and severe left neural foraminal stenosis.  3.  At C4-C5, there is severe right neural foraminal stenosis.  4.  At C5-C6, there is moderate right and moderate to severe left neural foraminal stenosis.  5.  At C7-T1, there is mild to moderate spinal canal stenosis.   MRI LUMBAR SPINE:   1.  At L3-L4, there is a right subarticular/foraminal disc protrusion which contacts and posteriorly displaces the traversing right L4 nerve root. Moderate facet arthropathy contacts and compresses the foraminal portion of the exiting right L3 nerve root within the neural foramen with associated severe right neural foraminal stenosis.  2.  At L4-L5, there is severe bilateral facet arthropathy.  3.  Moderate disc bulge with a left subarticular.

## 2021-05-31 NOTE — TELEPHONE ENCOUNTER
Left message to call back for: medication question  Information to relay to patient:  Below message. Patient can keep his already scheduled appointment on 9/5/2019 if he would like to discuss changing his adderall.

## 2021-05-31 NOTE — TELEPHONE ENCOUNTER
I will send muscle relaxant Flexeril 5 mg 1 to 2 tablets 3 times daily as needed for the muscle spasms.  If he does need refills on hydrocodone before the long holiday weekend we can see him in clinic today, but per clinic policy I would need to see him for refills on this medication.  Steroid medication can cause side effects of hot flashes and nausea, this should improve, it also could be caused by the spasms as well in the back.  Otherwise I would recommend following up 2 weeks postinjection as well.

## 2021-05-31 NOTE — PATIENT INSTRUCTIONS - HE
Please follow up two weeks post procedure with Ursula to evaluate your plan of care.       DISCHARGE INSTRUCTIONS    During office hours (8:00 a.m.- 4:30 p.m.) questions or concerns may be answered  by calling Spine Navigation Nurses at  [phone].     If you experience any problems after hours  please call [phone] and you will be connected to [facility].     All Patients:    ? You may experience an increase in your symptoms for the first 2 days (It may take anywhere between 2 days- 2 weeks for the steroid to have maximum effect).    ? You may use ice on the injection site, as frequently as 20 minutes each hour if needed.    ? You may take your pain medicine.    ? You may continue taking your regular medication after your injection. If you have had a Medial Branch Block you may resume pain medication once your pain diary is completed.    ? You may shower. No swimming, tub bath or hot tub for 48 hours.  You may remove your bandaid/bandage as soon as you are home.    ? You may resume light activities, as tolerated.    ? Resume your usual diet as tolerated.    ? It is strongly advised that you do not drive for 1-3 hours post injection.    ? If you have had oral sedation:  Do not drive for 8 hours post injection.      ? If you have had IV sedation:  Do not drive for 24 hours post injection.  Do not operate hazardous machinery or make important personal/business decisions for 24 hours.      POSSIBLE STEROID SIDE EFFECTS (If steroid/cortisone was used for your procedure)    -If you experience these symptoms, it should only last for a short period      Swelling of the legs                Skin redness (flushing)       Mouth (oral) irritation     Blood sugar (glucose) levels              Sweats                      Mood changes    Headache    Sleeplessness         POSSIBLE PROCEDURE SIDE EFFECTS  -Call the [facility] Center if you are concerned    Increased Pain             Increased numbness/tingling         Nausea/Vomiting            Bruising/bleeding at site        Redness or swelling                                                Difficulty walking        Weakness             Fever greater than 100.5    *In the event of a severe headache after an epidural steroid injection that is relieved by lying down, please call the [facility] to speak with a clinical staff member*

## 2021-05-31 NOTE — TELEPHONE ENCOUNTER
Per Ursula Packer CNP: Per email now it sounds that the injection is authorized, he does not have to do physical therapy first as a previously mentioned unless he would like to.     Patient returned call. All information shared with him. He wants to move forward with the scheduled injection on 8/9/19.

## 2021-05-31 NOTE — PROGRESS NOTES
Assessment:     Diagnoses and all orders for this visit:    Chronic bilateral low back pain with bilateral sciatica  -     HYDROcodone-acetaminophen 5-325 mg per tablet; Take 1 tablet by mouth 2 (two) times a day as needed for pain.  Dispense: 14 tablet; Refill: 0  -     OPS TFESI Lumbar Bilateral; Future; Expected date: 08/22/2019    Foraminal stenosis of lumbar region  -     HYDROcodone-acetaminophen 5-325 mg per tablet; Take 1 tablet by mouth 2 (two) times a day as needed for pain.  Dispense: 14 tablet; Refill: 0  -     OPS TFESI Lumbar Bilateral; Future; Expected date: 08/22/2019    Lumbar radiculopathy    Lumbar radiculitis  -     HYDROcodone-acetaminophen 5-325 mg per tablet; Take 1 tablet by mouth 2 (two) times a day as needed for pain.  Dispense: 14 tablet; Refill: 0  -     OPS TFESI Lumbar Bilateral; Future; Expected date: 08/22/2019       Rex SLOAN Bond Jr. is a 49 y.o. y.o. male with past medical history significant for Crohn's disease, gout, hypertension, diverticulosis, obesity, hyperlipidemia, anxiety, pancreatic cancer, rectal abscess, history lumbar spine surgery who presents today for follow-up regarding:    -Chronic bilateral low back pain with now equal bilateral radiculitis into the posterior lateral thighs and posterior lateral calf with associated numbness and tingling, 50% short-lived relief with previous L3-4 TFESI.    Patient is neurologically intact on exam.    Plan:     A shared decision making plan was used. The patient's values and choices were respected. Prior medical records from 7/24/2019 were reviewed today. The following represents what was discussed and decided upon by the provider and the patient.        -DIAGNOSTIC TESTS: Images were personally reviewed and interpreted.   --Discussed that if symptoms are not improving with L4-5 TFESI would recommend updated imaging.  --Lumbar spine MRI 5/17/2018 shows L3-4 disc protrusion with right L4 nerve root compression with severe right  foraminal stenosis.  L4-5 severe bilateral facet arthropathy with mild central and mild bilateral foraminal stenosis.  Moderate facet arthropathy L3-4 and L5-S1.  --Cervical spine MRI 5/17/2018 shows congenital spinal canal stenosis with degenerative changes resulting in multilevel neuroforaminal stenosis it is severe on the right at C4-5 and on the left at C3-4.    -INTERVENTIONS: Ordered bilateral L4-5 transforaminal epidural steroid injection see if we get further relief of his bilateral low back pain and bilateral radicular pain that is equal bilaterally as he does have bilateral foraminal stenosis at this level.  He got minimal relief with L3-4 TFESI.    -MEDICATIONS: Encourage patient to increase gabapentin to 300 mg, 2 tablets 3 times daily and discussed with patient that he can take higher dose as well up to 3 tablets 3 times daily if tolerated.  -Also prescribed hydrocodone 5/325 mg 1 tablet twice daily as needed for severe breakthrough pain number 14 tablets given for 7 days worth.  [place]  checked. Discussed the risks (eg, addiction, overdose, worsening pain) verses benefit of opioid use with patient today. Explained that this medication will not be a long term solution to ongoing pain. Discussed using lowest effective dose and the importance of other measures for pain management including PT, other non-opioid medications, behavioral treatments, and other procedure options.   Discussed side effects of medications and proper use. Patient verbalized understanding.    -PHYSICAL THERAPY: Encourage patient continue home exercises from prior physical therapies sessions which he is diligent about doing.  Discussed that if symptoms do not improve with injection we would recommend considering further physical therapy.  Discussed the importance of core strengthening, ROM, stretching exercises with the patient and how each of these entities is important in decreasing pain.  Explained to the patient that the purpose  of physical therapy is to teach the patient a home exercise program.  These exercises need to be performed every day in order to decrease pain and prevent future occurrences of pain.        -PATIENT EDUCATION:  20 minutes of total visit time was spent face to face with the patient today, 60 % of the visit was spent on counseling, education, and coordinating care.   -5 minutes spent outside of visit time, non-face-to-face time, reviewing chart.    -FOLLOW UP: Follow-up for injection with Dr. Soto in 2 weeks postinjection.  Advised to contact clinic if symptoms worsen or change.    Subjective:     Rex Bond Jr. is a 49 y.o. male who presents today for follow-up regarding ongoing significant pain now into the low back bilaterally as well as bilateral legs that is worse in the posterior lateral thighs and posterior lateral calf with associated numbness and tingling currently his pain is a constant 7/10 2010 at its worst, and 8 at its best.  Patient reports that he got about 50% relief on the left with the L4 5 injection however only for about 2 days and then the pain returned and pain is more equal bilaterally at this time.  Patient reports he has minimal pain into the anterior thigh is bigger pain is in the posterior and lateral thigh region.    Treatment to Date: History lumbar spine surgery.  Physical therapy in the past, patient is continuing home exercises on a daily basis with minimal benefit.    Left L3-4 TFESI 8/9/2019 with 50% initial relief only.  Preprocedure pain 6/10, post 0/10.     Medications:  Gabapentin 300 mg 2 twice daily with minimal benefit, no side effect.    Patient Active Problem List   Diagnosis     Serum Enzyme Levels - ALT (SGPT) Elevated     Crohn's Disease     Cramp Of Limb     Esophageal Reflux     Acute Gout     Gout     Hypertension     Herniated Intervertebral Disc     Diverticulosis     Soft Tissue Neoplasm Of The Abdomen     Testicular Neoplasm     Bright Red Blood Per  Rectum     Arthropathy Of The Ankle / Foot     Maisha-rectal abscess     Diverticulosis of intestine without bleeding     Internal hemorrhoids     Chewing tobacco use     Obesity     Hyperlipidemia     Anxiety     Furuncle of forehead     Obesity (BMI 35.0-39.9) with comorbidity (H)       Current Outpatient Medications on File Prior to Encounter   Medication Sig Dispense Refill     gabapentin (NEURONTIN) 300 MG capsule Take 2 capsules (600 mg total) by mouth 3 (three) times a day. 180 capsule 3     allopurinol (ZYLOPRIM) 300 MG tablet TAKE 1 TABLET(300 MG) BY MOUTH TWICE DAILY 180 tablet 3     dextroamphetamine-amphetamine (ADDERALL XR) 30 MG 24 hr capsule Take 1 capsule (30 mg total) by mouth daily. 30 capsule 0     losartan-hydrochlorothiazide (HYZAAR) 100-12.5 mg per tablet TAKE 1 TABLET BY MOUTH EVERY DAY 90 tablet 3     omeprazole (PRILOSEC) 20 MG capsule TAKE 1 CAPSULE BY MOUTH EVERY DAY 90 capsule 2     venlafaxine (EFFEXOR XR) 37.5 MG 24 hr capsule Take 1 capsule (37.5 mg total) by mouth daily. May increase to 2 capsules by mouth once daily after 2 weeks. 60 capsule 0     venlafaxine (EFFEXOR-XR) 75 MG 24 hr capsule TAKE 1 CAPSULE(75 MG) BY MOUTH DAILY 30 capsule 0     No current facility-administered medications on file prior to encounter.        No Known Allergies    Past Medical History:   Diagnosis Date     Anxiety      Crohn disease (H)      Diverticulosis      Esophageal reflux      Gout      Herniated disc      HTN (hypertension)      Hyperlipidemia      Hypokalemia      Motion sickness      Pancreatic cancer (H)     mass removed at [place] 4/2012        Review of Systems  ROS:  Specifically negative for bowel/bladder dysfunction, balance changes, headache, dizziness, foot drop, fevers, chills, appetite changes, nausea/vomiting, unexplained weight loss. Otherwise 13 systems reviewed are negative. Please see the patient's intake questionnaire from today for details.    Reviewed Social, Family, Past Medical  and Past Surgical history with patient, no significant changes noted since prior visit.     Objective:     /79 (Patient Site: Right Arm, Patient Position: Sitting)   Pulse 96   SpO2 99%     PHYSICAL EXAMINATION:    --CONSTITUTIONAL: Well developed, well nourished, healthy appearing individual.  --PSYCHIATRIC: Appropriate mood and affect. No difficulty interacting due to temper, social withdrawal, or memory issues.  --SKIN: Lumbar region is dry and intact.   --RESPIRATORY: Normal rhythm and effort. No abnormal accessory muscle breathing patterns noted.   --MUSCULOSKELETAL:  Normal lumbar lordosis noted, no lateral shift.  --GROSS MOTOR: Easily arises from a seated position. Gait is non-antalgic  --LUMBAR SPINE:  Inspection reveals no evidence of deformity. Range of motion is not limited in lumbar flexion, extension, lateral rotation. No tenderness to palpation lumbar spine. Straight leg raising in the supine position is negative to radicular pain. Sciatic notch non-tender.   --SACROILIAC JOINT: One Finger point test negative.  --LOWER EXTREMITY MOTOR TESTING:  Plantar flexion left 5/5, right 5/5   Dorsiflexion left 5/5, right 5/5   Great toe MTP extension left 5/5, right 5/5  Knee flexion left 5/5, right 5/5  Knee extension left 5/5, right 5/5   Hip flexion left 5/5, right 5/5  Hip abduction left 5/5, right 5/5  Hip adduction left 5/5, right 5/5   --HIPS: Full range of motion bilaterally. Negative FABERs on the involved lower extremity.   --NEUROLOGIC: Bilateral patellar and achilles reflexes are physiologic and symmetric. Sensation to light touch is intact in the bilateral L4, L5, and S1 dermatomes.    RESULTS:   Imaging: Lumbar spine imaging was reviewed today. The images were shown to the patient and the findings were explained using a spine model.      MR CERVICAL SPINE WO CONTRAST  MR LUMBAR SPINE WO CONTRAST  5/17/2018  INDICATION: Neck pain with radiation into bilateral arms, left hand numbness  cervical radiculitis in the c8 nerve dermatome  TECHNIQUE: Without IV contrast.  CONTRAST: None  COMPARISON: None.  FINDINGS:  MRI CERVICAL SPINE:  Normal vertebral body heights, alignment and marrow signal. Normal appearance of the craniocervical junction and C1-C2. No abnormal cord signal. The visualized intracranial contents are unremarkable. Grossly normal paraspinal soft tissues. The vertebral artery flow voids are preserved.  C2-C3: Normal disc height. No herniation. Moderate right and no left facet arthropathy. No spinal canal stenosis. No right neural foraminal stenosis. No left neural foraminal stenosis.  C3-C4: Normal disc height. Small central disc protrusion. Mild to moderate bilateral facet arthropathy. Mild spinal canal stenosis. Moderate to severe right neural foraminal stenosis. Severe left neural foraminal stenosis.  C4-C5: Normal disc height. Central disc osteophyte complex. Moderate to severe bilateral facet arthropathy. Mild to moderate spinal canal stenosis. Severe right neural foraminal stenosis. Mild to moderate left neural foraminal stenosis.    C5-C6: Normal disc height. Central disc osteophyte complex. Severe bilateral facet arthropathy. Mild to moderate spinal canal stenosis. Moderate right neural foraminal stenosis. Moderate to severe left neural foraminal stenosis.  C6-C7: Normal disc height. Central disc osteophyte complex. Mild bilateral facet arthropathy. Mild to moderate spinal canal stenosis. Mild right neural foraminal stenosis. Mild left neural foraminal stenosis.  C7-T1: Normal disc height. No herniation. Mild bilateral facet arthropathy. Mild to moderate spinal canal stenosis. No right neural foraminal stenosis. No left neural foraminal stenosis.  MRI LUMBAR SPINE:  Nomenclature is based on 5 lumbar type vertebral bodies. Normal vertebral body heights. Mild retrolisthesis of L3 on L4. There is a T1-weighted hypointense lesion in the posterior right aspect of the L3 inferior  endplate which is hyperintense on STIR. The morphologic appearance is most suggestive of a degenerative Schmorl's node. No suspicious bone marrow signal abnormality identified. No pars defect. The conus tip is identified at L1. T2-weighted hyperintense lesion in the left kidney may represent a renal cyst. No abdominal aortic aneurysm. The visualized portions of the bony pelvis are normal for age.  T12-L1: Normal disc height and signal. No herniation. No facet arthropathy. No spinal canal stenosis. No right neural foraminal stenosis. No left neural foraminal stenosis.  L1-L2: Normal disc height. Mild loss of T2-weighted signal in the disc. Mild disc bulge. Mild bilateral facet arthropathy. Mild spinal canal stenosis. No right neural foraminal stenosis. No left neural foraminal stenosis.  L2-L3: Normal disc height. Mild loss of T2-weighted signal in the disc. Moderate disc bulge which contacts and may mildly compress the traversing left L3 nerve root. It contacts but does not definitely compress the traversing right L3 nerve root. Moderate bilateral facet arthropathy. Mild to moderate spinal canal stenosis. No right neural foraminal stenosis. No left neural foraminal stenosis.  L3-L4: Mild to moderate loss of disc height and moderate loss of T2-weighted signal in the disc. Central annular fissure. Moderate disc bulge. Right subarticular/foraminal disc protrusion which contacts and posteriorly displaces the traversing right L4 nerve root. Moderate bilateral facet arthropathy. Mild spinal canal stenosis. The facet hypertrophy contacts and compresses the foraminal portion of the exiting right L3 nerve root within the neural foramen. There is severe right neural foraminal stenosis. Mild to moderate left neural foraminal stenosis.  L4-L5: Moderate to severe loss of disc height and T2-weighted signal in the disc. L4 laminectomy. Moderate disc bulge with severe narrowing of the bilateral lateral recesses. Severe bilateral  facet arthropathy. Mild spinal canal stenosis. Mild right neural foraminal stenosis. Mild left neural foraminal stenosis.  L5-S1: Normal disc height. Mild loss of T2-weighted signal in the disc. Small central disc protrusion. Moderate bilateral No facet arthropathy. No spinal canal stenosis. No right neural foraminal stenosis. No left neural foraminal stenosis.  CONCLUSION:  MRI CERVICAL SPINE:  1.  There is mild diffuse congenital spinal canal stenosis with superimposed degenerative changes.  2.   At C3-C4, there is moderate to severe right and severe left neural foraminal stenosis.  3.  At C4-C5, there is severe right neural foraminal stenosis.  4.  At C5-C6, there is moderate right and moderate to severe left neural foraminal stenosis.  5.  At C7-T1, there is mild to moderate spinal canal stenosis.   MRI LUMBAR SPINE:   1.  At L3-L4, there is a right subarticular/foraminal disc protrusion which contacts and posteriorly displaces the traversing right L4 nerve root. Moderate facet arthropathy contacts and compresses the foraminal portion of the exiting right L3 nerve root within the neural foramen with associated severe right neural foraminal stenosis.  2.  At L4-L5, there is severe bilateral facet arthropathy.  3.  Moderate disc bulge with a left subarticular.

## 2021-05-31 NOTE — TELEPHONE ENCOUNTER
Patient Returning Call  Reason for call:  Patient calling back  Information relayed to patient:  Relayed message below.  Patient stated understanding and says that he'll keep his appointment.  Patient has additional questions:  No  If YES, what are your questions/concerns:  None  Okay to leave a detailed message?: No call back needed

## 2021-05-31 NOTE — TELEPHONE ENCOUNTER
----- Message from Ursula Packer CNP sent at 8/7/2019 12:43 PM CDT -----  Regarding: Injection denial  Please call patient and notify him that his insurance denied his injection and he would need to trial physical therapy again first.  We discussed previously that this could be a potential scenario.  If he is open to I would recommend starting physical therapy. If symptoms are still not improved with physical therapy we can then resubmit after completion of 4 sessions.  If symptoms are aggravated by physical therapy even 1 or 2 sessions we can trial resubmitting for injection then as well.

## 2021-05-31 NOTE — TELEPHONE ENCOUNTER
Phone call to pt to discuss provider's response. Information shared. He would like the muscle relaxant, but would also like to be seen todat. Assisted pt with scheduling the appointment.

## 2021-05-31 NOTE — TELEPHONE ENCOUNTER
Spoke with patient who would like 30 capsules of his current prescription.  He is aware that when he comes in on 9/5 that he would not receive a different prescription of adderall until he is due for another refill.

## 2021-05-31 NOTE — TELEPHONE ENCOUNTER
Patient was also transferred to scheduling for a medication check appointment. He would like to discuss switching this medication to the immediate release in hopes he would not experience occasional jitteriness in the evening.        Controlled Substance Refill Request  Medication Name:   Requested Prescriptions     Pending Prescriptions Disp Refills     dextroamphetamine-amphetamine (ADDERALL XR) 30 MG 24 hr capsule 30 capsule 0     Sig: Take 1 capsule (30 mg total) by mouth daily.     Date Last Fill: 7/26/2019  Pharmacy: [facility] #[id number]      Submit electronically to pharmacy  Controlled Substance Agreement Date Scanned:   Encounter-Level CSA Scan Date:    There are no encounter-level csa scan date.       Last office visit with prescriber/PCP: 5/1/2019 Anisa Bush CNP OR same dept: 7/1/2019 Love Stafford MD OR same specialty: 7/1/2019 Love Stafford MD  Last physical: 10/30/2018 Last MTM visit: Visit date not found

## 2021-05-31 NOTE — TELEPHONE ENCOUNTER
Medication Question or Clarification  Who is calling: Patient  What medication are you calling about? (include dose and sig) venlafaxine 75 mg daily  Who prescribed the medication?: Anisa Bush CNP  What is your question/concern?: He is asking to increase his dose to 150 mg daily  Pharmacy: [facility] #[id number]  Okay to leave a detailed message?: Yes  Site CMT - Please call the pharmacy to obtain any additional needed information.

## 2021-05-31 NOTE — PATIENT INSTRUCTIONS - HE
~Please call Nurse Navigation line [phone] with any questions or concerns about your treatment plan, if symptoms worsen and you would like to be seen urgently, or if you have problems controlling bladder and bowel function.  ~Follow Up Appointment time slots with Ursula Packer, CNP with [facility], are also available at the [facility] location near [facility] on the first and third THURSDAY afternoons of each month.

## 2021-06-01 VITALS — WEIGHT: 258 LBS | BODY MASS INDEX: 34.99 KG/M2

## 2021-06-01 VITALS — BODY MASS INDEX: 35.55 KG/M2 | HEIGHT: 72 IN | WEIGHT: 262.5 LBS

## 2021-06-01 VITALS — WEIGHT: 261 LBS | HEIGHT: 72 IN | BODY MASS INDEX: 35.35 KG/M2

## 2021-06-01 VITALS — BODY MASS INDEX: 34.92 KG/M2 | HEIGHT: 72 IN | WEIGHT: 257.8 LBS

## 2021-06-01 VITALS — HEIGHT: 72 IN | WEIGHT: 263.5 LBS | BODY MASS INDEX: 35.69 KG/M2

## 2021-06-01 VITALS — WEIGHT: 261.5 LBS | BODY MASS INDEX: 35.47 KG/M2

## 2021-06-01 NOTE — TELEPHONE ENCOUNTER
I placed a referral to neurosurgery.  I am unsure if they would consider performing surgery on him, but a consult is an option if he would like to pursue that route.  I also placed a referral to the pain clinic to help him better manage his pain.  Thanks.

## 2021-06-01 NOTE — TELEPHONE ENCOUNTER
These are so irritating.    Can you re-edit this as appropriate and send into pharmacy.    Thank you

## 2021-06-01 NOTE — TELEPHONE ENCOUNTER
Please place appropriate order - I believe it should be for neurosurgery.   I do not see he is scheduled with the spine clinic unless he is seen outside of [facility] for this.       Per 9/5/2019 OV note:   1. Chronic bilateral low back pain with bilateral sciatica  2. Left lumbar radiculitis  Provided prescription for Percocet to use sparingly over the weekend.  He has an appointment approaching with the spine clinic next week.  Patient has found no improvement with cortisone injections and continues to experience worsening pain.  Discussed that he may want to consult with a neurosurgeon due to chronic back pain.  - oxyCODONE-acetaminophen (PERCOCET/ENDOCET) 5-325 mg per tablet; Take 1 tablet by mouth 2 (two) times a day as needed for pain (Severe pain).  Dispense: 20 tablet; Refill: 0

## 2021-06-01 NOTE — TELEPHONE ENCOUNTER
I sent a prescription for Cyclobenzaprine 10 mg three times daily as needed to the pharmacy.  He should avoid taking this with other sedatives.  Thanks.

## 2021-06-01 NOTE — TELEPHONE ENCOUNTER
Question following Office Visit  When did you see your provider: 9/05/2019  What is your question: He is unsure of what specialty, but the patient states Anisa discussed doing a referral for his back. He states his pain is not improving.   Okay to leave a detailed message: Yes

## 2021-06-01 NOTE — TELEPHONE ENCOUNTER
Reason contacted:  Medication problem   Information relayed:  Below message   Additional questions:  No  Further follow-up needed:  No  Okay to leave a detailed message:  No

## 2021-06-01 NOTE — TELEPHONE ENCOUNTER
Medication Question or Clarification  Who is calling: Patient  What medication are you calling about? (include dose and sig) Cyclobenzaprine 5 mg, 1-2 tablets three times a day  Who prescribed the medication?: Anisa Bush CNP   What is your question/concern?: Patient is needing to take 3 tablets at a time to get relief.  Could provider increase strength of tablet or give higher quantity of the 5 mg tablets?  Pharmacy: [facility] #[id number]  Okay to leave a detailed message?: Yes  Site CMT - Please call the pharmacy to obtain any additional needed information.

## 2021-06-01 NOTE — TELEPHONE ENCOUNTER
I spoke with the patient this morning and notified him an order for neurosurgery and pain clinic has been placed.

## 2021-06-01 NOTE — PROGRESS NOTES
Assessment and Plan:     1. Chronic bilateral low back pain with bilateral sciatica  2. Left lumbar radiculitis  Provided prescription for Percocet to use sparingly over the weekend.  He has an appointment approaching with the spine clinic next week.  Patient has found no improvement with cortisone injections and continues to experience worsening pain.  Discussed that he may want to consult with a neurosurgeon due to chronic back pain.  - oxyCODONE-acetaminophen (PERCOCET/ENDOCET) 5-325 mg per tablet; Take 1 tablet by mouth 2 (two) times a day as needed for pain (Severe pain).  Dispense: 20 tablet; Refill: 0    3. Irritability  Patient would like to either increase the venlafaxine or change to Cymbalta.  Cymbalta may assist with the chronic pain.  Will discuss conversion with pharmacist.    4. Hypertension  This is suboptimal.  Blood pressure is likely elevated due to back pain.  He will continue to monitor.  He continues losartan-hydrochlorothiazide.    5. Class 1 obesity due to excess calories with serious comorbidity and body mass index (BMI) of 32.0 to 32.9 in adult  The following high BMI interventions were performed this visit: encouragement to exercise and lifestyle education regarding diet    6. Attention deficit disorder (ADD) without hyperactivity  He is currently taking Adderall XR 30 mg daily.  He would like to change back to regular release tablets.  He is not due for refill yet.  When he calls in for the refill, will change to 15 mg twice daily.  He is to follow-up in 1 month for medication management or sooner with any further concerns.  He is content with the plan.    Subjective:     Rex is a 49 y.o. male presenting to the clinic for multiple concerns today.  Patient has a chronic lumbar pain.  He recently obtained cortisone injections from the spine clinic.  He is taking gabapentin twice daily.  He complains of shooting pain within his legs.  He is having trouble sleeping at night.  He has tried  muscle relaxants with no relief.  Movement assists the pain.  He is unsure if he should should consider surgery.  He does see the spine clinic next week.  He believes his elevated blood pressures have been related to pain.  Patient has found that he is more edgy and irritable.  He is currently taking venlafaxine  mg daily.  He is interested in increasing the dose or trying a new medication.  Patient also has ADD and is taking Adderall XR 30 mg daily.  He is interested in changing to the regular release.  He feels as though the extended release may be keeping him awake at night.  Patient has a history of schwannoma which was diagnosed and treated at [facility].  He is due for follow-up.    Review of Systems: A complete 14 point review of systems was obtained and is negative or as stated in the history of present illness.    Social History     Socioeconomic History     Marital status:      Spouse name: Lizzette     Number of children: 2     Years of education: Not on file     Highest education level: Not on file   Occupational History     Occupation: construction     Employer: [employer]   Social Needs     Financial resource strain: Not on file     Food insecurity:     Worry: Not on file     Inability: Not on file     Transportation needs:     Medical: Not on file     Non-medical: Not on file   Tobacco Use     Smoking status: Never Smoker     Smokeless tobacco: Current User     Types: Chew   Substance and Sexual Activity     Alcohol use: Yes     Alcohol/week: 0.5 oz     Types: 1 Standard drinks or equivalent per week     Comment: occasionally mostly on the weekend, rarely drinks     Drug use: No     Sexual activity: Yes     Partners: Female     Birth control/protection: Surgical     Comment: vasectomy   Lifestyle     Physical activity:     Days per week: Not on file     Minutes per session: Not on file     Stress: Not on file   Relationships     Social connections:     Talks on phone: Not on file      Gets together: Not on file     Attends Taoist service: Not on file     Active member of club or organization: Not on file     Attends meetings of clubs or organizations: Not on file     Relationship status: Not on file     Intimate partner violence:     Fear of current or ex partner: Not on file     Emotionally abused: Not on file     Physically abused: Not on file     Forced sexual activity: Not on file   Other Topics Concern     Not on file   Social History Narrative    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           Active Ambulatory Problems     Diagnosis Date Noted     Serum Enzyme Levels - ALT (SGPT) Elevated      Crohn's Disease      Cramp Of Limb      Esophageal Reflux      Acute Gout      Gout      Hypertension      Herniated Intervertebral Disc      Diverticulosis      Soft Tissue Neoplasm Of The Abdomen      Testicular Neoplasm      Bright Red Blood Per Rectum      Arthropathy Of The Ankle / Foot      Maisha-rectal abscess 04/14/2015     Diverticulosis of intestine without bleeding 10/06/2015     Internal hemorrhoids 10/06/2015     Chewing tobacco use 03/23/2016     Obesity 03/23/2016     Hyperlipidemia 03/23/2016     Anxiety 03/23/2016     Furuncle of forehead 03/01/2017     Obesity (BMI 35.0-39.9) with comorbidity (H) 10/30/2018     Resolved Ambulatory Problems     Diagnosis Date Noted     Hypokalemia      Skin Symptoms      Headache      Anxiety      Foot Pain (Soft Tissue)      Acrochordon      Motion sickness      Lower Back Pain      Hyperlipidemia      Past Medical History:   Diagnosis Date     Anxiety      Crohn disease (H)      Diverticulosis      Esophageal reflux      Gout      Herniated disc      HTN (hypertension)      Hyperlipidemia      Hypokalemia      Motion sickness      Pancreatic cancer (H)        Family History   Problem Relation Age of Onset     Hypertension Mother      Thyroid disease Mother      Diabetes Father      Hypertension Father      Heart disease Paternal Grandfather       Stroke Paternal Grandmother      Stroke Maternal Grandmother      Throat cancer Maternal Grandmother         Laryngeal cancer       Objective:     /82   Pulse 92   Wt (!) 237 lb (107.5 kg)   SpO2 98%   BMI 32.14 kg/m      Patient is alert, in no obvious distress.   Skin: Warm, dry.  No lesions or rashes.  Skin turgor rapid return.   HEENT:  Head normocephalic, atraumatic.  Eyes normal.  Ears normal.  Nose patent, mucosa pink.  Oropharynx mucosa pink.  No lesions or tonsillar enlargement.   Neck: Supple, no lymphadenopathy.No thyromegaly.  Lungs:  Clear to auscultation. Respirations even and unlabored.  No wheezing or rales noted.   Heart:  Regular rate and rhythm.  No murmurs.  Musculoskeletal:  He ambulates slowly.  He is tender to palpation of his bilateral lower lumbar paraspinous muscles.

## 2021-06-01 NOTE — TELEPHONE ENCOUNTER
[facility] multiple times with no relief of pain. Patient states he is not sleeping due to pain and burning in back  He is waiting on a call from the Pain Clinic to get an appointment there.  He has also set up an appointment with surgeon for October 3, 2019. In the meantime, he is asking for refills on oxycodone-acetaminophen 5-325 mg tablets and cyclobenzaprine 5 mg tablets.  (Cyclobenzaprine is under a separate refill request.)        Controlled Substance Refill Request  Medication Name:   Requested Prescriptions     Pending Prescriptions Disp Refills     oxyCODONE-acetaminophen (PERCOCET/ENDOCET) 5-325 mg per tablet 20 tablet 0     Sig: Take 1 tablet by mouth 2 (two) times a day as needed for pain (Severe pain).                  Date Last Fill: 9/5/19  Pharmacy: [facility] #[id number]    Submit electronically to pharmacy  Controlled Substance Agreement Date Scanned:   Encounter-Level CSA Scan Date:    There are no encounter-level csa scan date.       Last office visit with prescriber/PCP: 9/5/2019 Anisa Bush CNP OR same dept: 9/5/2019 Anisa Bush CNP OR same specialty: 9/5/2019 Anisa Bush CNP  Last physical: 10/30/2018 Last MTM visit: Visit date not found

## 2021-06-01 NOTE — TELEPHONE ENCOUNTER
"Caller is PharmD \"Buddy\" from [facility].    Needs to clarify reason for Percocet Rx transmitted today.  Is pt's pain chronic?  Or acute?    Reason for Rx currently indicates \"Chronic\".  If so, no need to change current quantity of tablets (#20) transmitted.  However, if Acute, then quantity can be only #7 tabs.    OV notes of today (9/5/19) not yet completed.  Therefore unable to immediately convey to PharmD whether pain = acute or chronic.    Please advise; thank you.  PharmD will await a callback -> [phone].    Thanks so much-    Nighat Parker RN BS  [facility] RN Triage    Reason for Disposition    Pharmacy calling with prescription questions and triager unable to answer question    Protocols used: MEDICATION QUESTION CALL-A-AH      "

## 2021-06-01 NOTE — TELEPHONE ENCOUNTER
Medication Request  Medication name: Adderrall 15 mg two times a day  Pharmacy Name and Location: [facility] #[id number] [place] [address] AT [place] [address]   Reason for request: The patient would like 15 mg two times a day and not 30 mg every day as written on the script below.     dextroamphetamine-amphetamine (ADDERALL XR) 30 MG 24 hr capsule 30 capsule 0 8/27/2019     Sig - Route: Take 1 capsule (30 mg total) by mouth daily. - Oral    Sent to pharmacy as: dextroamphetamine-amphetamine (ADDERALL XR) 30 MG 24 hr capsule    Earliest Fill Date: 8/27/2019    E-Prescribing Status: Receipt confirmed by pharmacy (8/27/2019  4:06 PM CDT)      When did you use medication last?:  9/26/2019  Patient offered appointment:  patient declined  Okay to leave a detailed message: yes

## 2021-06-01 NOTE — TELEPHONE ENCOUNTER
Per 9/5/2019 OV note:   1. Chronic bilateral low back pain with bilateral sciatica  2. Left lumbar radiculitis  Provided prescription for Percocet to use sparingly over the weekend.  He has an appointment approaching with the spine clinic next week.  Patient has found no improvement with cortisone injections and continues to experience worsening pain.  Discussed that he may want to consult with a neurosurgeon due to chronic back pain.  - oxyCODONE-acetaminophen (PERCOCET/ENDOCET) 5-325 mg per tablet; Take 1 tablet by mouth 2 (two) times a day as needed for pain (Severe pain).  Dispense: 20 tablet; Refill: 0       Called patient to discuss percocet as he should have 4 days left of medication if he were to take this as prescribed.  Patient states 1 tablets twice daily did not help with his pain.     He has been taking a total of 5 tablets per day. He would take 1 tablet twice daily during the day and 3 tablets at night to help him sleep. Even with taking 5 tablets per day patient reports minimal pain relief.     Notified patient this is a controlled substance and he should always call prior to making any medication adjustments.      Please advise

## 2021-06-01 NOTE — TELEPHONE ENCOUNTER
Left message to call back for: medication refill  Information to relay to patient:  Please notify patient unfortunately we are unable to send controlled substances electronically to the pharmacy and we are working on getting this resolved. In the mean time a printed prescription has been placed at the  for .

## 2021-06-02 VITALS — BODY MASS INDEX: 34.13 KG/M2 | WEIGHT: 252 LBS | HEIGHT: 72 IN

## 2021-06-02 VITALS — HEIGHT: 72 IN | BODY MASS INDEX: 34.51 KG/M2 | WEIGHT: 254.8 LBS

## 2021-06-02 VITALS — WEIGHT: 258.6 LBS | BODY MASS INDEX: 35.03 KG/M2 | HEIGHT: 72 IN

## 2021-06-02 NOTE — PATIENT INSTRUCTIONS - HE
Plan Interventions recommended today:  Assessment tool-        Follow up in 2-3 weeks, we will discuss a pain treatment plan at that time, which may include narcotics and alternative therapies. OVL at the next visit.       Sign a ROMY at the  so we can obtain your records for our next visit      Continue your current regimen of alleviating factors      Please see your current provider for any continued prescription, they may choose to provide you prescriptions of your narcotics until we have your urine screen back. They will continue to manage your general health and have requested you see the pain center for pain management. Please discuss any health concerns with your PCP       [place] Precautions are taken with every patient which includes a  report and drug screen. A UA will be taken today for baseline screening.       Behavioral Therapy- not at this time. Will have to do for cannabis if this is an option.        Physical and Occupational Therapy- there are different types of PT including myofascial release, SI joint therapy, balance restoration, gait training, muscle conditioning, pool therapy and neuromodulation. Will consider the use of the SCS, will consider the use of a traction unit at home due to the DDD and compression noted      Injections-continue with spine center unless you want to come to the pain center.       Acupuncture- you will need to check with your insurance for coverage, get an intake packet from the  to schedule an appointment.       Non-opoid medical management includes- lyrica 150 mg two times a day, continue the use of cymbalta as you are for central pain. Will start magnesium at bedtime as well as trazodone for sleep.       Some folks want to use CBD oil for pain control- it is made from the Hemp plant, that is ok, however it is difficult to find a reputable source, currently [employer] is a good resource. If you are employed check with your  employer prior to starting. It is ok to continue the use of this.       Education was provided to the patient today in regards to their specific diagnosis.    As a reminder- chronic pain is generally stable, if you experience a new injury or new different pain it is expected that you present to the ED or urgent care for evaluation. DO NOT use your chronic pain medications to treat any new or different pain other then what it is intended for. We do not replace any lost or stolen prescriptions or provide early refills for overtaking your medications.       Orders placed today   Orders Placed This Encounter   Procedures     Pain Management Drug Panel, Urine     Standing Status:   Standing     Number of Occurrences:   1       Patient reminders:   Diagnostics: UDT/SWAB collected 10/22/2019 and results are pending.  UDT/SWAB:  Patient required a random Urine Drug Testing, due to the need to comply with [place] [employer] Policy Guidelines and CDC Guideline for the use of any controlled substances. This is to ensure that patient is compliant with treatment, and monitor for risks such as diversion, abuse, or any other aberrant behaviors. Patient is either being considered for or taking a controlled substance. Unexpected findings will be discussed and treatment decision may be adjusted. Testing is being implemented across the board randomly w/o bias related to age, race, gender, socioeconomic status or [facility] affiliation.     SAFETY REMINDERS  No alcohol while taking controlled substances. Alcohol is not an illegal substance, it is unsafe to use in combination. It is a build up of substances in the body that can be extremely hazardous and may cause respirations to slow to a dangerous rate resulting in hospitalization, brain damage, or death.    Opioid medications have been associated with sharp rise in unintentional overdose and death.  Overdose is a condition characterized by the consumption in excess of a particular drug  causing adverse effects. This can happen b/c you are sick, accidentally or intentionally took an extra dose, are on multiple medication that can interact. Someone took your medication and they are not use to the medication.  Symptoms of overdose include:   !breathing slow and shallow, erratic or not at all  !pinpoint pupils, hallucinations  !confusion  !muscle jerks, slack muscles   !extreme sleepiness or loss of alertness   !awake but not able to talk   !face pale or clammy, vomiting, for lighter skinned people, the skin tone turns bluish purple, for darker skinned people, it turns grayish or ashen   If in a situation where overdose is a concern engage the emergency response system (dial 911).    In one study it was noted that 80% of unintentional overdoses occurred in people who were taking a combination of opioids and benzodiazepines.    Do not sell, loan, borrow or share your opioid medication with anyone. Deaths have occurred as a result of this practice. It is illegal and patients are being prosecuted.     Prevent unexpected access/loss of medication: Keep medication locked. Only carry what you need with you.    The patient agrees to the plan and has no further questions, if questions arise the patient knows to call [phone].     11:08 AM      Thank you for this consult and opportunity to assist with this patients care.    Vesta Ramos, [facility]  [address]  [place] [address]  Ph: [phone]  Fax: [phone]

## 2021-06-02 NOTE — TELEPHONE ENCOUNTER
Who is calling:  WIfe  Reason for Call:  Patient is out of Adderall. Please review.   Date of last appointment with primary care: NA  Okay to leave a detailed message: No

## 2021-06-02 NOTE — PROGRESS NOTES
Assessment:     Diagnoses and all orders for this visit:    Chronic neck pain  -     Ambulatory referral to PT/OT  -     OPS Paravertebral Facet Jnt Inj Cer Thor; Future; Expected date: 10/10/2019    Arthropathy of cervical facet joint  -     Ambulatory referral to PT/OT  -     OPS Paravertebral Facet Jnt Inj Cer Thor; Future; Expected date: 10/10/2019    Pain of cervical facet joint  -     Ambulatory referral to PT/OT  -     OPS Paravertebral Facet Jnt Inj Cer Thor; Future; Expected date: 10/10/2019    Myofascial pain  -     Ambulatory referral to PT/OT    Cervical stenosis of spinal canal  -     Ambulatory referral to PT/OT    Foraminal stenosis of cervical region  -     Ambulatory referral to PT/OT    Chronic bilateral low back pain with bilateral sciatica  -     Ambulatory referral to PT/OT    Foraminal stenosis of lumbar region  -     Ambulatory referral to PT/OT       Rex SLOAN Bond Jr. is a 49 y.o. y.o. male with past medical history significant for Crohn's disease, gout, hypertension, diverticulosis, obesity, hyperlipidemia, anxiety, pancreatic cancer, rectal abscess, history lumbar spine surgery who presents today for follow-up regarding:     -Chronic bilateral low back pain with bilateral lumbar radiculitis L5 dermatomal pattern with short-term relief with EDNA's.    -Chronic generalized neck pain right greater than left most consistent with facet mediated pain.  Patient does have cervical facet arthropathy C4-5, C5-6 and C6-7 that is greatest on the right at C5-6 where he does have tenderness to palpation as well.    -Generalized muscle shakiness ongoing.    Plan:     A shared decision making plan was used.  The patient's values and choices were respected. Prior medical records from 8/30/2019 to current were reviewed today. The following represents what was discussed and decided upon by the provider and the patient.     -DIAGNOSTIC TESTS: Images were personally reviewed and interpreted.   --Dr. Sifuentes  did order EMG to be done with Dr. Nicole.   --Cervical spine MRI 10/7/2019 shows 1 mm retrolisthesis C4-5, increased edema on the right C5-6 facet joint.  Moderate facet arthropathy C4-5, C5-6, C6-7.  Varying degrees of bilateral neuroforaminal stenosis and mild central stenosis at C4-5 but no cord signal abnormalities.  --Lumbar flexion-extension x-ray 10/3/2019 shows no instability.  --Lumbar spine MRI 5/17/2018 shows L3-4 disc protrusion with right L4 nerve root compression with severe right foraminal stenosis.  L4-5 severe bilateral facet arthropathy with mild central and mild bilateral foraminal stenosis.  Moderate facet arthropathy L3-4 and L5-S1.  --Cervical spine MRI 5/17/2018 shows congenital spinal canal stenosis with degenerative changes resulting in multilevel neuroforaminal stenosis it is severe on the right at C4-5 and on the left at C3-4.    -INTERVENTIONS: Ordered a right C4-5 and C5-6 facet joint steroid injection see if we can get further relief of his right greater than left neck pain as he does have facet arthropathy at this level.  Discussed that if it improves his symptoms on the right but pain is not improved on the left could consider a left C4-5 and C5-6 facet injection as well.    -MEDICATIONS: No change in medications advised patient to continue gabapentin 600 mg 3 times daily, he did not tolerate higher dose.  -Continue Flexeril only as needed.  -Patient did request refills on Percocet today however discussed with him that this is not something I would recommend long-term for his chronic symptoms and he should follow-up with his PCP if he feels this medication is needed ongoing.  Discussed side effects of medications and proper use. Patient verbalized understanding.    -PHYSICAL THERAPY: Referral to [facility] to establish home exercises for neck and back pain.  Discussed the importance of core strengthening, ROM, stretching exercises with the patient  and how each of these entities is important in decreasing pain.  Explained to the patient that the purpose of physical therapy is to teach the patient a home exercise program.  These exercises need to be performed every day in order to decrease pain and prevent future occurrences of pain.        -PATIENT EDUCATION: 25 minutes of total visit time was spent face to face with the patient today, 60 % of the visit was spent on counseling, education, and coordinating care.   -5 minutes spent outside of visit time, non-face-to-face time, reviewing chart.    -FOLLOW UP: Follow-up for injection with Dr. Soto in 2 weeks postinjection.  Advised to contact clinic if symptoms worsen or change.    Subjective:     Rex Bond Jr. is a 49 y.o. male who presents today for follow-up regarding ongoing generalized chronic low back pain that radiates to the lower extremities in which he received 80% relief with the previous injections with his back pain during the day however is still having ongoing muscle spasms in his back to radiate all the way up to the his neck that cause shakiness throughout the day which is concerning to him still.  Patient did see Dr. Sifuentes in regards to this and she is obtaining an EMG.    Today he discusses that his neck pain is most bothersome however right greater than left generalized cervical spine with worse at the lower spine localizing to C5-6 level with some pain rating to the bilateral trapezius and shoulders however patient denies arm pain, he does report occasional numbness and tingling in bilateral hands greatest in the third and fourth fingers.  Patient denies upper extremity weakness, denies recent trips or falls, denies bowel or bladder loss control.    -Patient was evaluated by Dr. Sifuentes with [facility] neurosurgery 10/3/2019 and 10/10/2019, no current surgical plans.    Treatment to Date: History lumbar spine surgery.  Physical therapy in the past, patient is continuing home  exercises on a daily basis with minimal benefit.     Left L3-4 TFESI 8/9/2019 with 50% initial relief only.  Preprocedure pain 6/10, post 0/10.  Bilateral L4-5 TFESI 8/23/2019 with 50% initial, 80% relief 1 week postinjection.  Preprocedure pain 4/10, post 2/10.  Side effect of new back spasms however.     Medications:  Gabapentin 300 mg 2 twice daily with minimal benefit, no side effect, however nausea with higher dose    Patient Active Problem List   Diagnosis     Serum Enzyme Levels - ALT (SGPT) Elevated     Crohn's Disease     Esophageal Reflux     Gout     Hypertension     Herniated Intervertebral Disc     Diverticulosis     Soft Tissue Neoplasm Of The Abdomen     Testicular Neoplasm     Maisha-rectal abscess     Diverticulosis of intestine without bleeding     Internal hemorrhoids     Chewing tobacco use     Obesity     Hyperlipidemia     Anxiety     Obesity (BMI 35.0-39.9) with comorbidity (H)     Attention deficit disorder (ADD) without hyperactivity       Current Outpatient Medications on File Prior to Encounter   Medication Sig Dispense Refill     cyclobenzaprine (FLEXERIL) 10 MG tablet Take 1 tablet (10 mg total) by mouth 3 (three) times a day as needed for muscle spasms. 60 tablet 1     gabapentin (NEURONTIN) 300 MG capsule Take 2 capsules (600 mg total) by mouth 3 (three) times a day. 180 capsule 3     allopurinol (ZYLOPRIM) 300 MG tablet TAKE 1 TABLET(300 MG) BY MOUTH TWICE DAILY 180 tablet 3     dextroamphetamine-amphetamine (ADDERALL) 15 mg Tab Take 15 mg by mouth 2 (two) times a day. 60 tablet 0     DULoxetine (CYMBALTA) 60 MG capsule TAKE 1 CAPSULE(60 MG) BY MOUTH DAILY 90 capsule 3     losartan-hydrochlorothiazide (HYZAAR) 100-12.5 mg per tablet TAKE 1 TABLET BY MOUTH EVERY DAY 90 tablet 3     omeprazole (PRILOSEC) 20 MG capsule TAKE 1 CAPSULE BY MOUTH EVERY DAY 90 capsule 2     [DISCONTINUED] venlafaxine (EFFEXOR-XR) 150 MG 24 hr capsule Take 1 capsule (150 mg total) by mouth daily. 90 capsule 0      No current facility-administered medications on file prior to encounter.        No Known Allergies    Past Medical History:   Diagnosis Date     Anxiety      Crohn disease (H)      Diverticulosis      Esophageal reflux      Gout      Herniated disc      HTN (hypertension)      Hyperlipidemia      Hypokalemia      Motion sickness      Pancreatic cancer (H)     mass removed at [facility] 4/2012        Review of Systems  ROS: Positive for numbness and tingling, headache, increased pain in the evening/nighttime hours, balance changes, difficulty with hand skills.  Specifically negative for bowel/bladder dysfunction, balance changes, headache, dizziness, foot drop, fevers, chills, appetite changes, nausea/vomiting, unexplained weight loss. Otherwise 13 systems reviewed are negative. Please see the patient's intake questionnaire from today for details.    Reviewed Social, Family, Past Medical and Past Surgical history with patient, no significant changes noted since prior visit.     Objective:     PHYSICAL EXAMINATION:   --CONSTITUTIONAL: Vital signs as above. No acute distress. The patient is well nourished and well groomed.  --PSYCHIATRIC:  The patient is awake, alert, oriented to person, place, time and answering questions appropriately with clear speech. Appropriate mood and affect   --HEENT: Sclera are non-injected. Extraocular muscles are intact.   --SKIN: Skin over the face, bilateral upper extremities, and posterior torso is clean, dry, intact without rashes.  --RESPIRATORY: Normal rhythm and effort. No abnormal accessory muscle breathing patterns noted.   --GROSS MOTOR: Easily arises from a seated position.   --CERVICAL SPINE: Inspection reveals no evidence of deformity. Range of motion is limited in all movements with flexion, extension and right greater than left lateral rotation.  Mild generalized tenderness to palpation greatest on the right at C5-6 region.  Negative Spurling maneuver  bilaterally.   --SHOULDERS: Full range of motion bilaterally. Negative empty can.  --UPPER EXTREMITY MOTOR TESTING:  Wrist flexion left 5/5, right 5/5  Wrist extension left 5/5, right 5/5  Pronators left 5/5, right 5/5  Biceps left 5/5, right 5/5   Triceps left 5/5, right 5/5   Shoulder abduction left 5/5, right 5/5   left 5/5, right 5/5  --NEUROLOGIC: CN III-XII are grossly intact. 2/4 symmetric biceps, brachioradialis, triceps reflexes bilaterally. Sensation to upper extremities is intact. Negative Abraham's bilaterally.   --VASCULAR: Warm upper limbs bilaterally.     Imaging of the cervical spine: Cervical spine imaging was reviewed today. The images were shown to the patient and the findings were explained using a spine model.      Mr Cervical Spine Without Contrast  Result Date: 10/8/2019  INDICATION: Indication not listed - see reason for exam. Generalized weakness. COMPARISON: Cervical spine MRI 5/17/2018. TECHNIQUE: Without IV contrast. FINDINGS: 1 mm retrolisthesis of C4 on C5. Vertebral body heights are maintained. Nonpathologic marrow signal. Visualized posterior fossa structures are grossly within normal limits. No abnormal cord signal. Increased edema involving the right-sided C5-C6 facet joints and adjacent soft tissues. The paraspinal soft tissues are grossly within normal limits. The flow voids of the vertebral arteries are present.   C2-C3: Normal disc height. No herniation. Mild bilateral facet arthropathy. No spinal canal or neural foraminal stenosis.   C3-C4: Normal intervertebral disc height. Small central/right paracentral disc protrusion. Mild bilateral facet arthropathy. Right uncovertebral spurring. Mild narrowing of the right lateral recess. No spinal canal narrowing. Moderate right neural foraminal narrowing. Mild left neural foraminal narrowing.   C4-C5: Normal intervertebral disc height. Small broad-based disc bulge. Moderate bilateral facet arthropathy. Right uncovertebral spurring.  Mild spinal canal narrowing. Moderate to severe right neural foraminal narrowing. Mild to moderate left neural foraminal narrowing.   C5-C6: Normal intervertebral disc height. There is a broad-based disc bulge. Severe right and moderate left facet arthropathy. No spinal canal narrowing. Mild to moderate bilateral neural foraminal narrowing.   C6-C7: Normal intervertebral disc height. There is a small broad-based disc bulge. Moderate bilateral facet arthropathy. Right uncovertebral spurring. No spinal canal narrowing. Moderate right neural foraminal narrowing. Mild left neural foraminal narrowing.   C7-T1: Normal disc height. No herniation. Moderate bilateral facet arthropathy. No spinal canal or neural foraminal stenosis.   Conclusion:  1.  Mild to moderate degenerative changes of the cervical spine, stable to minimally increased compared to previous cervical spine MRI 7/17/2018.   2.  Mild spinal canal narrowing at C4-C5.   3.  Mild narrowing of the right lateral recess at C3-C4.   4.  Moderate to severe right and mild to moderate left neural foraminal narrowing at C4-C5.   5.  Moderate right and mild left neural foraminal narrowing at C3-C4 and C6-C7.   6.  Interval increase in the edema involving the right-sided C5-C6 facet joints and adjacent soft tissues. These are likely on degenerative basis in the clinical absence of infection.       Mr Lumbar Spine Without Contrast  Result Date: 9/25/2019   INDICATION: Back pain or radiculopathy, > 6 wks. COMPARISON: 05/17/2018 MRI lumbar spine. TECHNIQUE: Without IV contrast FINDINGS: Nomenclature is based on 5 lumbar type vertebral bodies. Normal vertebral body heights, alignment and marrow signal. The conus tip is identified at T12-L1 mild degenerative SI joint changes bilaterally. No extraspinal abnormality. The visualized portions  of the bony pelvis are normal for age.   T12-L1: Normal disc height and signal. No herniation. No facet arthropathy. No spinal canal  stenosis. No right neural foraminal stenosis. No left neural foraminal stenosis.   L1-L2: Slight disc dehydration and a trace of disc bulging. Mild bilateral facet arthropathy. Mild spinal canal stenosis. Minimal right neural foraminal stenosis. Minimal left neural foraminal stenosis.   L2-L3: 2 to 3 mm of retrolisthesis and mild disc bulging with slight flattening of the ventral thecal sac and mild lateral recess encroachment without definite nerve root contact. Mild to moderate facet arthropathy. Mild to moderate spinal canal stenosis. Mild right neural foraminal stenosis. Mild left neural foraminal stenosis.   L3-L4: 4 to 5 mm of retrolisthesis of L3 on L4, disc dehydration and diminished disc height with moderate disc bulging and broad-based right paracentral and foraminal disc protrusion, contacting and likely compressing the right L4 nerve root, similar to the prior study. Slight component of laminotomy change along the inferior lamina margin. Mild to moderate facet arthropathy. Minimal spinal canal stenosis. Moderately severe right neural foraminal stenosis due to the disc protrusion as well as degenerative facet changes. Moderate left neural foraminal stenosis.   L4-L5: Status post decompressive laminectomy. Moderate degenerative spondylosis with desiccation, decreased disc height and Modic type II signal changes. Mild to moderate degenerative ridging and disc bulging complex slightly more to the right side with probable right and possible left L5 nerve root contact at the lateral recess entry zone. Moderately severe bilateral facet arthropathy. Mild spinal canal stenosis. Moderate right neural foraminal stenosis. Moderately severe left neural foraminal stenosis.   L5-S1: Normal disc height and signal. No herniation. Moderate bilateral facet arthropathy. No spinal canal stenosis. No right neural foraminal stenosis. No left neural foraminal stenosis.   Conclusion:  1.  No significant interval change, no  evidence of new acute lumbar spine pathology.   2.  Right paracentral and foraminal broad-based disc protrusion at L3-L4 contacting, displacing and possibly compressing the right L4 nerve root as well as bilateral neural foraminal stenosis right more than left from degenerative disc change and degenerative facet change.   3.  Status post L4-L5 laminectomy with degenerative spondylosis changes and degenerative facet changes resulting in lateral recess encroachment with possible L5 nerve root contact right more than left.   4.  Please see level by level report for further details.      Xr Lumbar Spine Flex And Ext 2 Or 3 Vws   Result Date: 10/3/2019  INDICATION: Radiculopathy, lumbar region COMPARISON: Lumbar spine MRI September 24, 2019 and May 17, 2018.   5 lumbar-type vertebral bodies. Hypoplastic ribs at T12. L4 laminectomy. Straightening of the normal lumbar lordosis. Minimal retrolisthesis of L3 on L4, unchanged on flexion or extension. The vertebral bodies of the lumbar spine otherwise have normal stature and alignment. No dynamic instability. Degenerative endplate changes and anterior marginal osteophytes at L3/L4-L5/S1, most pronounced at the L4/L5 level. Multilevel degenerative facet arthropathy, most pronounced at L4/L5 and L5/S1. Soft tissues unremarkable.       MR CERVICAL SPINE WO CONTRAST  MR LUMBAR SPINE WO CONTRAST  5/17/2018  INDICATION: Neck pain with radiation into bilateral arms, left hand numbness cervical radiculitis in the c8 nerve dermatome  TECHNIQUE: Without IV contrast.  CONTRAST: None  COMPARISON: None.  FINDINGS:  MRI CERVICAL SPINE:  Normal vertebral body heights, alignment and marrow signal. Normal appearance of the craniocervical junction and C1-C2. No abnormal cord signal. The visualized intracranial contents are unremarkable. Grossly normal paraspinal soft tissues. The vertebral artery flow voids are preserved.  C2-C3: Normal disc height. No herniation. Moderate right and no left  facet arthropathy. No spinal canal stenosis. No right neural foraminal stenosis. No left neural foraminal stenosis.  C3-C4: Normal disc height. Small central disc protrusion. Mild to moderate bilateral facet arthropathy. Mild spinal canal stenosis. Moderate to severe right neural foraminal stenosis. Severe left neural foraminal stenosis.  C4-C5: Normal disc height. Central disc osteophyte complex. Moderate to severe bilateral facet arthropathy. Mild to moderate spinal canal stenosis. Severe right neural foraminal stenosis. Mild to moderate left neural foraminal stenosis.    C5-C6: Normal disc height. Central disc osteophyte complex. Severe bilateral facet arthropathy. Mild to moderate spinal canal stenosis. Moderate right neural foraminal stenosis. Moderate to severe left neural foraminal stenosis.  C6-C7: Normal disc height. Central disc osteophyte complex. Mild bilateral facet arthropathy. Mild to moderate spinal canal stenosis. Mild right neural foraminal stenosis. Mild left neural foraminal stenosis.  C7-T1: Normal disc height. No herniation. Mild bilateral facet arthropathy. Mild to moderate spinal canal stenosis. No right neural foraminal stenosis. No left neural foraminal stenosis.  MRI LUMBAR SPINE:  Nomenclature is based on 5 lumbar type vertebral bodies. Normal vertebral body heights. Mild retrolisthesis of L3 on L4. There is a T1-weighted hypointense lesion in the posterior right aspect of the L3 inferior endplate which is hyperintense on STIR. The morphologic appearance is most suggestive of a degenerative Schmorl's node. No suspicious bone marrow signal abnormality identified. No pars defect. The conus tip is identified at L1. T2-weighted hyperintense lesion in the left kidney may represent a renal cyst. No abdominal aortic aneurysm. The visualized portions of the bony pelvis are normal for age.  T12-L1: Normal disc height and signal. No herniation. No facet arthropathy. No spinal canal stenosis. No  right neural foraminal stenosis. No left neural foraminal stenosis.  L1-L2: Normal disc height. Mild loss of T2-weighted signal in the disc. Mild disc bulge. Mild bilateral facet arthropathy. Mild spinal canal stenosis. No right neural foraminal stenosis. No left neural foraminal stenosis.  L2-L3: Normal disc height. Mild loss of T2-weighted signal in the disc. Moderate disc bulge which contacts and may mildly compress the traversing left L3 nerve root. It contacts but does not definitely compress the traversing right L3 nerve root. Moderate bilateral facet arthropathy. Mild to moderate spinal canal stenosis. No right neural foraminal stenosis. No left neural foraminal stenosis.  L3-L4: Mild to moderate loss of disc height and moderate loss of T2-weighted signal in the disc. Central annular fissure. Moderate disc bulge. Right subarticular/foraminal disc protrusion which contacts and posteriorly displaces the traversing right L4 nerve root. Moderate bilateral facet arthropathy. Mild spinal canal stenosis. The facet hypertrophy contacts and compresses the foraminal portion of the exiting right L3 nerve root within the neural foramen. There is severe right neural foraminal stenosis. Mild to moderate left neural foraminal stenosis.  L4-L5: Moderate to severe loss of disc height and T2-weighted signal in the disc. L4 laminectomy. Moderate disc bulge with severe narrowing of the bilateral lateral recesses. Severe bilateral facet arthropathy. Mild spinal canal stenosis. Mild right neural foraminal stenosis. Mild left neural foraminal stenosis.  L5-S1: Normal disc height. Mild loss of T2-weighted signal in the disc. Small central disc protrusion. Moderate bilateral No facet arthropathy. No spinal canal stenosis. No right neural foraminal stenosis. No left neural foraminal stenosis.  CONCLUSION:  MRI CERVICAL SPINE:  1.  There is mild diffuse congenital spinal canal stenosis with superimposed degenerative changes.  2.   At  C3-C4, there is moderate to severe right and severe left neural foraminal stenosis.  3.  At C4-C5, there is severe right neural foraminal stenosis.  4.  At C5-C6, there is moderate right and moderate to severe left neural foraminal stenosis.  5.  At C7-T1, there is mild to moderate spinal canal stenosis.   MRI LUMBAR SPINE:   1.  At L3-L4, there is a right subarticular/foraminal disc protrusion which contacts and posteriorly displaces the traversing right L4 nerve root. Moderate facet arthropathy contacts and compresses the foraminal portion of the exiting right L3 nerve root within the neural foramen with associated severe right neural foraminal stenosis.  2.  At L4-L5, there is severe bilateral facet arthropathy.  3.  Moderate disc bulge with a left subarticular.

## 2021-06-02 NOTE — PROGRESS NOTES
Patient presents to the clinic today for a follow up with Vesta Ramos CNP regarding consult of leg pain. Patient describes their pain as burning. Her/His function score is 7.

## 2021-06-02 NOTE — PROGRESS NOTES
NEUROSURGERY CONSULTATION NOTE    10/3/2019     Rex Bond Jr. is a 49 y.o. male who is sent to us in consultation by Anisa Bush for evaluation of lumbar stenosis.         CONSULTATION ASSESSMENT AND PLAN:     48 yo male with a h/o L4-5 decompression previously who presents with low back and leg pain. Also has num]bness in his b/l feet. Generalized weakness. More recently been dropping objects also having imbalance. MRI lumbar spine shows lateral recess/foraminal stenosis at L3-4>L4-5. Recommend lumbar flex/ex XR. Given cervical complaints recommend updating cervical MRI as well. F/u after imaging obtained.     I spent more than 45 minutes in this apt, examining the pt, reviewing the scans, reviewing notes from chart, discussing treatment options with risks and benefits and coordinating care. >50 % clinic time was spent in face to face counseling and coordinating care    Mellissa Sifuentes       HPI: 48 yo male who presents with low back and leg pain. Pain is in medial thighs but also back of calves. Can feel burning as well when he is more relaxed. Movement improves the pain. Any position for too long worsens the pain. Laying down and staying still can improve the pain. Pain will travel up his back to his cervical spine. Numbness in legs chronically in the feet. Little weakness of his legs. No bowel or bladder dysfunction.     Increased dropping objects. Unable to lift heavy objects with his hands like he used too. Increased imbalance. Last few months have worsened.       S/p left L3-4 TFESI and b/l L4-5 TFESI in August with 2 days relief with each. Couldn't tolerate higher dose of gabapentin. Unsure if helps at lower doses.     S/p L4-5 decompression 8 years ago. Back pain. At first after surgery had some improvement more towards a year.     Past Medical History:   Diagnosis Date     Anxiety      Crohn disease (H)      Diverticulosis      Esophageal reflux      Gout      Herniated disc      HTN  (hypertension)      Hyperlipidemia      Hypokalemia      Motion sickness      Pancreatic cancer (H)     mass removed at [place] 4/2012     Past Surgical History:   Procedure Laterality Date     APPENDECTOMY  2003    Description: Appendectomy;  Recorded: 02/10/2009;  Comments: 11-0-03     BACK SURGERY  2014    L3,4,5 surgically repaired     excision of abdominal wall tumor      found out was pancreatic cancer     KNEE SURGERY Right      VASECTOMY  2000       REVIEW OF SYSTEMS:  ROS reviewed with pt as documented on pt health form of 10/3/2019.          MEDICATIONS:  Current Outpatient Medications   Medication Sig Dispense Refill     allopurinol (ZYLOPRIM) 300 MG tablet TAKE 1 TABLET(300 MG) BY MOUTH TWICE DAILY 180 tablet 3     cyclobenzaprine (FLEXERIL) 10 MG tablet Take 1 tablet (10 mg total) by mouth 3 (three) times a day as needed for muscle spasms. 60 tablet 1     dextroamphetamine-amphetamine (ADDERALL) 15 mg Tab Take 15 mg by mouth 2 (two) times a day. 60 tablet 0     DULoxetine (CYMBALTA) 60 MG capsule Take 1 capsule (60 mg total) by mouth daily. 30 capsule 2     gabapentin (NEURONTIN) 300 MG capsule Take 2 capsules (600 mg total) by mouth 3 (three) times a day. 180 capsule 3     losartan-hydrochlorothiazide (HYZAAR) 100-12.5 mg per tablet TAKE 1 TABLET BY MOUTH EVERY DAY 90 tablet 3     omeprazole (PRILOSEC) 20 MG capsule TAKE 1 CAPSULE BY MOUTH EVERY DAY 90 capsule 2     No current facility-administered medications for this visit.          ALLERGIES/SENSITIVITIES:     No Known Allergies    PERTINENT SOCIAL HISTORY:   Social History     Socioeconomic History     Marital status:      Spouse name: Lizzette     Number of children: 2     Years of education: None     Highest education level: None   Occupational History     Occupation: construction     Employer: [employer]   Social Needs     Financial resource strain: None     Food insecurity:     Worry: None     Inability: None     Transportation  needs:     Medical: None     Non-medical: None   Tobacco Use     Smoking status: Never Smoker     Smokeless tobacco: Current User     Types: Chew   Substance and Sexual Activity     Alcohol use: Yes     Alcohol/week: 0.8 standard drinks     Types: 1 Standard drinks or equivalent per week     Comment: occasionally mostly on the weekend, rarely drinks     Drug use: No     Sexual activity: Yes     Partners: Female     Birth control/protection: Surgical     Comment: vasectomy   Lifestyle     Physical activity:     Days per week: None     Minutes per session: None     Stress: None   Relationships     Social connections:     Talks on phone: None     Gets together: None     Attends Religion service: None     Active member of club or organization: None     Attends meetings of clubs or organizations: None     Relationship status: None     Intimate partner violence:     Fear of current or ex partner: None     Emotionally abused: None     Physically abused: None     Forced sexual activity: None   Other Topics Concern     None   Social History Narrative    3/27/15- Patient works in [email or web address]             FAMILY HISTORY:  Family History   Problem Relation Age of Onset     Hypertension Mother      Thyroid disease Mother      Diabetes Father      Hypertension Father      Heart disease Paternal Grandfather      Stroke Paternal Grandmother      Stroke Maternal Grandmother      Throat cancer Maternal Grandmother         Laryngeal cancer        PHYSICAL EXAM:   Constitutional: /89   Pulse 98   Ht 6' (1.829 m)   Wt (!) 235 lb (106.6 kg)   SpO2 99%   BMI 31.87 kg/m       Mental Status: A & O in no acute distress.  Affect is appropriate.  Speech is fluent.  Recent and remote memory are intact.  Attention span and concentration are normal.     Cranial Nerves: CN1: grossly intact per patient recall. CN2: No funduscopic exam performed. CN3,4 & 6: Pupillary light response, lateral and vertical gaze normal.  No nystagmus.   Visual fields are full to confrontation. CN5: Intact to touch CN7: No facial weakness, smile, facial symmetry intact. CN8: Intact to spoken voice. CN9&10: Gag reflex, uvula midline, palate rises with phonation. CN11: Shoulder shrug 5/5 intact bilaterally. CN12: Tongue midline and moves freely from side to side.     Motor: No pronator drift of upper extremity. Normal bulk and tone all muscle groups of upper and lower extremities.      Sensory: Sensation intact bilaterally to light touch. Loss of vibratory sense on R foot only     Coordination:  Heel/toe/  gait intact.    Slight difficultly  tandem gait      Reflexes; supinator, biceps, triceps, knee/ ankle jerk intact. no hoffmans/ unable to test babinski due to patient participation/ no clonus.    IMAGING: I personally reviewed all radiographic images        Cc:   Anisa Bush, CNP  [address]  [facility] [address]

## 2021-06-02 NOTE — TELEPHONE ENCOUNTER
Refill Approved    Rx renewed per Medication Renewal Policy. Medication was last renewed on 1/11/19.    Yen Ospina, [facility] Triage/Med Refill 10/20/2019     Requested Prescriptions   Pending Prescriptions Disp Refills     omeprazole (PRILOSEC) 20 MG capsule [Pharmacy Med Name: OMEPRAZOLE 20MG CAPSULES] 90 capsule 0     Sig: TAKE 1 CAPSULE BY MOUTH EVERY DAY       GI Medications Refill Protocol Passed - 10/20/2019 12:24 PM        Passed - PCP or prescribing provider visit in last 12 or next 3 months.     Last office visit with prescriber/PCP: 9/5/2019 Anisa Bush CNP OR same dept: 9/5/2019 Anisa Bush CNP OR same specialty: 9/5/2019 Anisa Bush CNP  Last physical: 10/30/2018 Last MTM visit: Visit date not found   Next visit within 3 mo: Visit date not found  Next physical within 3 mo: Visit date not found  Prescriber OR PCP: Anisa Bush CNP  Last diagnosis associated with med order: 1. Esophageal reflux  - omeprazole (PRILOSEC) 20 MG capsule [Pharmacy Med Name: OMEPRAZOLE 20MG CAPSULES]; TAKE 1 CAPSULE BY MOUTH EVERY DAY  Dispense: 90 capsule; Refill: 0    If protocol passes may refill for 12 months if within 3 months of last provider visit (or a total of 15 months).

## 2021-06-02 NOTE — PATIENT INSTRUCTIONS - HE
Referral to Jacqui Packer at spine center for cervical pain.  Get EMG for both legs, follow up with Dr Sifuentes for results.

## 2021-06-02 NOTE — PATIENT INSTRUCTIONS - HE
Please follow up two to four weeks post procedure with Ursula to evaluate your plan of care.       DISCHARGE INSTRUCTIONS    During office hours (8:00 a.m.- 4:00 p.m.) questions or concerns may be answered  by calling [facility] Nurses at  [phone].  Messages received after hours will be returned the following business day.      In the case of an emergency, please dial 911 or seek assistance at the nearest Emergency Room/Urgent Care facility.     All Patients:    ? You may experience an increase in your symptoms for the first 2 days (It may take anywhere between 2 days- 2 weeks for the steroid to have maximum effect).    ? You may use ice on the injection site, as frequently as 20 minutes each hour if needed.    ? You may take your pain medicine.    ? You may continue taking your regular medication after your injection. If you have had a Medial Branch Block you may resume pain medication once your pain diary is completed.    ? You may shower. No swimming, tub bath or hot tub for 48 hours.  You may remove your bandaid/bandage as soon as you are home.    ? You may resume light activities, as tolerated.    ? Resume your usual diet as tolerated.    ? It is strongly advised that you do not drive for 1-3 hours post injection.    ? If you have had oral sedation:  Do not drive for 8 hours post injection.      ? If you have had IV sedation:  Do not drive for 24 hours post injection.  Do not operate hazardous machinery or make important personal/business decisions for 24 hours.      POSSIBLE STEROID SIDE EFFECTS (If steroid/cortisone was used for your procedure)    -If you experience these symptoms, it should only last for a short period      Swelling of the legs                Skin redness (flushing)       Mouth (oral) irritation     Blood sugar (glucose) levels              Sweats                      Mood changes    Headache    Sleeplessness         POSSIBLE PROCEDURE SIDE EFFECTS  -Call the Sp[facility] Center  if you are concerned    Increased Pain             Increased numbness/tingling        Nausea/Vomiting            Bruising/bleeding at site        Redness or swelling                                                Difficulty walking        Weakness             Fever greater than 100.5    *In the event of a severe headache after an epidural steroid injection that is relieved by lying down, please call the [facility] to speak with a clinical staff member*

## 2021-06-02 NOTE — PROGRESS NOTES
[facility]  New patient consultation        CC-  Chief Complaint   Patient presents with     Consult     leg pain     Rex SLOAN Bond Jr. 49 y.o. is here today, sent to me by  to discuss the patients pain.  Associated symptoms with pain include ongoing pain that is located in his back and neck, he has had surgery in his back x 2 notes that he continues to have burning in his legs bilaterally as well as neck pain, he continues to have tremors while at rest. He does not sleep well at night due to the burning pain down his legs     Alleviating factors: Include- nothing really except for his tilt table at night. Notes that the gabapentin is helpful but when the doses were too high it was making him nauseated. He is now reduced his dose to 600 mg three times a day.   Aggravating factors: activity including bending, standing, laying down or lifting  The patient denies using any assistive devices to help with mobilization.  Pain level today: On a scale of 1-10, the patient rates their pain at a 8/10  Function Rating: affects his ability to sleep, he notes that the burning is so bad at night he cannot sleep well.       HPI  Past Medical History:   Diagnosis Date     Anxiety      Arthritis      Crohn disease (H)      Diverticulosis      Esophageal reflux      Gout      Herniated disc      HTN (hypertension)      Hyperlipidemia      Hypokalemia      Motion sickness      Pancreatic cancer (H)     mass removed at [facility] 4/2012     Past Surgical History:   Procedure Laterality Date     APPENDECTOMY  2003    Description: Appendectomy;  Recorded: 02/10/2009;  Comments: 11-0-03     BACK SURGERY  2014    L3,4,5 surgically repaired     excision of abdominal wall tumor      found out was pancreatic cancer     HAND SURGERY       KNEE SURGERY Right      OTHER SURGICAL HISTORY      tumor removed from pancreas outer lining     SPINE SURGERY       VASECTOMY  2000     Family History   Problem Relation Age of Onset      Hypertension Mother      Thyroid disease Mother      Varicose Veins Mother      Diabetes Father      Hypertension Father      Heart attack Father      Heart disease Paternal Grandfather      Stroke Paternal Grandmother      Stroke Maternal Grandmother      Throat cancer Maternal Grandmother         Laryngeal cancer     Social History     Tobacco Use   Smoking Status Never Smoker   Smokeless Tobacco Current User     Types: Chew     Social History     Substance and Sexual Activity   Alcohol Use Yes     Alcohol/week: 0.8 standard drinks     Types: 1 Standard drinks or equivalent per week    Comment: occasionally mostly on the weekend, rarely drinks     Social History     Substance and Sexual Activity   Drug Use No     Social History     Substance and Sexual Activity   Sexual Activity Yes     Partners: Female     Birth control/protection: Surgical    Comment: vasectomy       Chemical Dependency History: Patient Denies  tobacco use, alcohol use, illicit substance use including THC.  Denies any chemical dependency evaluation or treatment in the past.  Denies any legal issues related to substance use.    Psychiatric History:  Patient reports no current psychiatrist or psychologist.  Denies any suicidal ideation.  Denies any hospitalizations for mental illness. Notes that he does have ADHD and was taking adderall but stopped recently due to the nausea.     ORT     low risk     Pertinent Pain Medications:  He currently takes gabapentin 600 mg three times a day     Previously tried medications:    NSAIDs: IBu as needed    Acetaminophen: none    Antidepressants: Cymbalta 60 mg per day    Opioids: previously taking percocet at bedtime for the pain control     Topicals: vudu cream for his shoulders.     Supplements: none    Muscle Relaxants: flexeril in the past.       Current Outpatient Medications:      allopurinol (ZYLOPRIM) 300 MG tablet, TAKE 1 TABLET(300 MG) BY MOUTH TWICE DAILY, Disp: 180 tablet, Rfl: 3     DULoxetine  (CYMBALTA) 60 MG capsule, TAKE 1 CAPSULE(60 MG) BY MOUTH DAILY, Disp: 90 capsule, Rfl: 3     gabapentin (NEURONTIN) 300 MG capsule, Take 2 capsules (600 mg total) by mouth 3 (three) times a day., Disp: 180 capsule, Rfl: 3     losartan-hydrochlorothiazide (HYZAAR) 100-12.5 mg per tablet, TAKE 1 TABLET BY MOUTH EVERY DAY, Disp: 90 tablet, Rfl: 3     omeprazole (PRILOSEC) 20 MG capsule, Take 1 capsule (20 mg total) by mouth daily., Disp: 90 capsule, Rfl: 3     dextroamphetamine-amphetamine (ADDERALL) 15 mg Tab, Take 15 mg by mouth 2 (two) times a day., Disp: 60 tablet, Rfl: 0     magnesium oxide 500 mg cap, Take 1 capsule by mouth at bedtime as needed., Disp: 30 each, Rfl: 1     pregabalin (LYRICA) 150 MG capsule, Take 1 capsule (150 mg total) by mouth 2 (two) times a day., Disp: 60 capsule, Rfl: 0     traZODone (DESYREL) 50 MG tablet, Take 1 tablet (50 mg total) by mouth at bedtime., Disp: 30 tablet, Rfl: 0    Therapeutic interventions previously tried-   Back surgery- decompression x 2 in his low back   Injections in his low back   injection in his cervical spine.   Working with the spine center.     Review of Systems:  12 point systems were reviewed with pt as documented on pt health form of 10/22/2019. ROS was positive for HTN, tremor-resting, ADHD, chrons in the past  the rest of the systems were pertinent negative.    Exam  Vitals:    10/22/19 1048   BP: 135/87   Patient Site: Left Arm   Patient Position: Sitting   Pulse: 86   Resp: 18   Weight: (!) 238 lb 8 oz (108.2 kg)   Height: 6' (1.829 m)     Constitutional-General:  Pleasant, straightforward, healthy appearing individual.   Psych-Mental Status: A & O in no acute distress. Speech is fluent.  Recent and remote memory are intact.  Attention span and concentration are normal. Displays appropriate mood and affect.   H,E,N,T- Symmetrical, Eyes- Perrla, Nares- patents bilaterally, throat- trachea is midline, airway is patent, unlabored respiratory  effort.  Lymph-cervical lymph system (anterior and posterior) without palpable nodules or tenderness throughout. Supraclavicular chain without palpable nodules or tenderness throughout.   Cardiovascular- Regular S1, S2 rhythm without murmurs, gallops, clicks or rubs. legs and feet are warm.  Pulses are palpable and grade 2 at the posterior tibial arteries bilaterally, no edema noted.  Pulmonary- lungs are clear to auscultation throughout   Musckuloskeletal  Gait:  Gait is normal.   Gross Motor: Toe walking, heel walking- very difficult for the patient , and Romberg tests are normal.   Strength:  Bilateral upper and lower extremity strength is normal and symmetric 5/5. No atrophy or tone abnormalities noted.   Sensation:  Altered sensation noted in his legs bilaterally in a L4-L5 distribution  Spine ROM:  Normal range of motion with no pain reproduction in the cervical, thoracic and lumbar spine.   Provocative Maneuvers:  Upper extremity, Hip, sacroiliac, and knee provocative maneuvers are negative,Tinel's test negative bilaterally, Facet loading test negative bilaterally. Impingement tests of rotator cuff pathology, negative bilaterally. Knee exam: Ligaments test was negative, Bruce test was negative. SLR test was negative bilaterally..   Palpation:  Inspection and palpatory examination of the spine and upper/lower extremities is unremarkable.   Neuro:  Bilateral upper and lower extremity coordination and muscle stretch reflexes are physiologic and symmetric 3/4- legs bilaterally.  Babinski responses are downgoing.  No clonus bilaterally. Negative hoffmans sign bilaterally.   Integumentary: no rashes or breaks in the skin, no open wounds.     Lab:  Last UDS on 10/22/2019 was taken today and is pending.      Imaging:  IMPRESSION:   1.  No significant interval change, no evidence of new acute lumbar spine pathology.     2.  Right paracentral and foraminal broad-based disc protrusion at L3-L4 contacting, displacing  and possibly compressing the right L4 nerve root as well as bilateral neural foraminal stenosis right more than left from degenerative disc change and   degenerative facet change.     3.  Status post L4-L5 laminectomy with degenerative spondylosis changes and degenerative facet changes resulting in lateral recess encroachment with possible L5 nerve root contact right more than left.     4.  Please see level by level report for further details.    EXAM: XR LUMBAR SPINE FLEX AND EXT 2 OR 3 VWS  LOCATION: [facility]  DATE/TIME: 10/3/2019 10:28 AM     INDICATION: Radiculopathy, lumbar region  COMPARISON: Lumbar spine MRI September 24, 2019 and May 17, 2018.     IMPRESSION:   5 lumbar-type vertebral bodies. Hypoplastic ribs at T12. L4 laminectomy. Straightening of the normal lumbar lordosis. Minimal retrolisthesis of L3 on L4, unchanged on flexion or extension. The vertebral bodies of the lumbar spine otherwise   have normal stature and alignment. No dynamic instability. Degenerative endplate changes and anterior marginal osteophytes at L3/L4-L5/S1, most pronounced at the L4/L5 level. Multilevel degenerative facet arthropathy, most pronounced at L4/L5 and L5/S1.   Soft tissues unremarkable.    IMPRESSION:   1.  Mild to moderate degenerative changes of the cervical spine, stable to minimally increased compared to previous cervical spine MRI 7/17/2018.  2.  Mild spinal canal narrowing at C4-C5.  3.  Mild narrowing of the right lateral recess at C3-C4.  4.  Moderate to severe right and mild to moderate left neural foraminal narrowing at C4-C5.  5.  Moderate right and mild left neural foraminal narrowing at C3-C4 and C6-C7.  6.  Interval increase in the edema involving the right-sided C5-C6 facet joints and adjacent soft tissues. These are likely on degenerative basis in the clinical absence of infection.    :  Dated 10/22/2019 was reviewed with the patient  Paper copy reviewed - patient notes that he does not  want any opioids and feels that he would like to avoid     Assessment -  Todays diagnosis includes   1. Chronic pain syndrome    2. Chronic bilateral low back pain with bilateral sciatica    3. Lumbar radiculopathy        After reviewing the patients chart and physical findings I agree that the patient would benefit from what the pain center has to offer. I have discussed with the  patient today the diagnosis and treatment recommendations.  We reviewed the natural progression of this problem at great length.  Some possible benefits and detriments of physical therapy, chiropractic care, medication management, behavorial therapy, anti-inflammatory diet and other alternative treatments were discussed.  We also discussed future possible treatment options in a stepwise fashion, including interventional pain procedures and injections and possible surgical referral if needed.     Tremors- he has been on gabapentin for over 8 years- there is a side effect of tremors and may be contributing to his resting tremors, consider reducing and elimininating. Will transition him to lyrica today to assist with reduced burning as he is still symptomatic despite high dose use and finds it difficult to sleep at night as he is no longer distracted. Will also provide trazadone at bedtime to assist with sleep. He feels that the flexeril was not helpful and stopped taking it. He may benefit from traction as well for his DDD at L4-S1 as the tilt table helps but it is difficult to use for an extended period of time. Will recommend PT with traction unit for home use.   Discussed magnesium to assist with muscle health as well as future use of recommendations from spine- who recommend a fusion.     Uses Vudu cream at time for his neck and shoulder pain but not his back pain     ADHD- defer to PCP, he started the use of the adderal in December but notes that he stopped becuase it has made him feel bad. He plans on following up with her.     The  patient understands that pain medication is not prescribed during the initial patient consultation at the pain center. If the patient is on pain medications for chronic pain, the PCP or prescribing provider may choose  to prescribe until the patient presents for follow up at the pain center. Medication prescriptions provided by the pain center, will depend on the UA results, safe prescribing practices established by the CDC and patient response to their current treatment regimen at the follow up visit.      Patient set goals today in the office to achieve with the pain centers help. They are:  1.to reduce his pain so that he can work fulltime    Plan Interventions recommended today:  Assessment tool-        Follow up in 2-3 weeks, we will discuss a pain treatment plan at that time, which may include narcotics and alternative therapies. OVL at the next visit.       Sign a ROMY at the  so we can obtain your records for our next visit      Continue your current regimen of alleviating factors      Please see your current provider for any continued prescription, they may choose to provide you prescriptions of your narcotics until we have your urine screen back. They will continue to manage your general health and have requested you see the pain center for pain management. Please discuss any health concerns with your PCP       [place] Precautions are taken with every patient which includes a  report and drug screen. A UA will be taken today for baseline screening.       Behavioral Therapy- not at this time. Will have to do for cannabis if this is an option.        Physical and Occupational Therapy- there are different types of PT including myofascial release, SI joint therapy, balance restoration, gait training, muscle conditioning, pool therapy and neuromodulation. Will consider the use of the SCS, will consider the use of a traction unit at home due to the DDD and compression noted      Injections-continue  with spine center unless you want to come to the pain center.       Acupuncture- you will need to check with your insurance for coverage, get an intake packet from the  to schedule an appointment.       Non-opoid medical management includes- lyrica 150 mg two times a day, continue the use of cymbalta as you are for central pain. Will start magnesium at bedtime as well as trazodone for sleep.       Some folks want to use CBD oil for pain control- it is made from the Hemp plant, that is ok, however it is difficult to find a reputable source, currently [employer] is a good resource. If you are employed check with your employer prior to starting. It is ok to continue the use of this.       Education was provided to the patient today in regards to their specific diagnosis.    As a reminder- chronic pain is generally stable, if you experience a new injury or new different pain it is expected that you present to the ED or urgent care for evaluation. DO NOT use your chronic pain medications to treat any new or different pain other then what it is intended for. We do not replace any lost or stolen prescriptions or provide early refills for overtaking your medications.       Orders placed today   Orders Placed This Encounter   Procedures     Pain Management Drug Panel, Urine     Standing Status:   Standing     Number of Occurrences:   1       Patient reminders:   Diagnostics: UDT/SWAB collected 10/22/2019 and results are pending.  UDT/SWAB:  Patient required a random Urine Drug Testing, due to the need to comply with [place] [employer] Policy Guidelines and CDC Guideline for the use of any controlled substances. This is to ensure that patient is compliant with treatment, and monitor for risks such as diversion, abuse, or any other aberrant behaviors. Patient is either being considered for or taking a controlled substance. Unexpected findings will be discussed and treatment decision may be adjusted.  Testing is being implemented across the board randomly w/o bias related to age, race, gender, socioeconomic status or Shinto affiliation.     SAFETY REMINDERS  No alcohol while taking controlled substances. Alcohol is not an illegal substance, it is unsafe to use in combination. It is a build up of substances in the body that can be extremely hazardous and may cause respirations to slow to a dangerous rate resulting in hospitalization, brain damage, or death.    Opioid medications have been associated with sharp rise in unintentional overdose and death.  Overdose is a condition characterized by the consumption in excess of a particular drug causing adverse effects. This can happen b/c you are sick, accidentally or intentionally took an extra dose, are on multiple medication that can interact. Someone took your medication and they are not use to the medication.  Symptoms of overdose include:   !breathing slow and shallow, erratic or not at all  !pinpoint pupils, hallucinations  !confusion  !muscle jerks, slack muscles   !extreme sleepiness or loss of alertness   !awake but not able to talk   !face pale or clammy, vomiting, for lighter skinned people, the skin tone turns bluish purple, for darker skinned people, it turns grayish or ashen   If in a situation where overdose is a concern engage the emergency response system (dial 911).    In one study it was noted that 80% of unintentional overdoses occurred in people who were taking a combination of opioids and benzodiazepines.    Do not sell, loan, borrow or share your opioid medication with anyone. Deaths have occurred as a result of this practice. It is illegal and patients are being prosecuted.     Prevent unexpected access/loss of medication: Keep medication locked. Only carry what you need with you.    The patient agrees to the plan and has no further questions, if questions arise the patient knows to call [phone].     11:08 AM        Thank you for this  consult and opportunity to assist with this patients care.    Vesta Ramos, [facility]  [address]  [place] [address]  Ph: [phone]  Fax: [phone]

## 2021-06-02 NOTE — TELEPHONE ENCOUNTER
Controlled Substance Refill Request  Medication Name:   Requested Prescriptions     Pending Prescriptions Disp Refills     dextroamphetamine-amphetamine (ADDERALL) 15 mg Tab 60 tablet 0     Sig: Take 15 mg by mouth 2 (two) times a day.     Date Last Fill: 9/27/2019  [facility]: [facility] ([address])    Submit electronically to pharmacy  Controlled Substance Agreement Date Scanned:   Encounter-Level CSA Scan Date:    There are no encounter-level csa scan date.       Last office visit with prescriber/PCP: 9/5/2019 Anisa Bush CNP OR same dept: 9/5/2019 Anisa Bush CNP OR same specialty: 9/5/2019 Anisa Bush CNP  Last physical: 10/30/2018 Last MTM visit: Visit date not found

## 2021-06-02 NOTE — PROGRESS NOTES
"Neurosurgery consultation was requested by: Anisadarrel Bush  Pain: yes primarily in low back, but he states it can be all over his back  Radicular Pain is present: yes shoulders  Lhermitte sign: no  Motor complaints: weakness in hands, trouble with gripping, running into this   Sensory complaints: numbness and tingling in LE if not constantly moving  Gait and balance issues: yes  Bowel or bladder issues: when urinating, he stands at the toilet longer to ensure emptying. Is not completely emptying otherwise  Duration of SX is: years  The symptoms are worse with: prolonged positions  The symptoms are better with: percocet, constantly moving and changing position  Injury: denies  Severity is: fluxuates, today mild  Patient has tried the following conservative measures: injections and stretches  States that after his injection in August, he had a \"shooting pain through the hip that retracted back\". He also reports a burning in LE B/L, back spasms and a previous back surgery approximately 8 years ago.   JAIDEN score is: 32%  Vishal Hinton LPN    "

## 2021-06-02 NOTE — TELEPHONE ENCOUNTER
Controlled Substance Refill Request  Medication:   Requested Prescriptions     Pending Prescriptions Disp Refills     dextroamphetamine-amphetamine (ADDERALL) 15 mg Tab 60 tablet 0     Sig: Take 15 mg by mouth 2 (two) times a day.     Date Last Fill: 9/27/19  Pharmacy: [facility], [address], [place]  Submit electronically to pharmacy  Controlled Substance Agreement on File:   Encounter-Level CSA Scan Date:    There are no encounter-level csa scan date.       Last office visit: 9/5/2019 Anisa Bush, CNP

## 2021-06-02 NOTE — PATIENT INSTRUCTIONS - HE
Thank you for choosing the [facility] for your EMG testing.    The ordering provider will receive your final EMG results within the next few days.  Please follow up with your provider for the results and further treatment recommendations.

## 2021-06-02 NOTE — TELEPHONE ENCOUNTER
Patient contacted and will repeat UDT   Today 10/28/19 @ 3 pm.    Laurel PALACIOS CMA  8:33 AM  10/28/2019

## 2021-06-02 NOTE — PROGRESS NOTES
NEUROSURGERY FOLLOWUP  NOTE    Rex Bond Jr. comes today in f/u after prior apt on 10/3/19 for lumbar stenosis.   50 yo male with a h/o L4-5 decompression previously who presents with low back and leg pain. Also has numbness in his b/l feet. Generalized weakness. More recently been dropping objects also having imbalance. MRI lumbar spine shows lateral recess/foraminal stenosis at L3-4>L4-5. Recommend lumbar flex/ex XR. Given cervical complaints recommend updating cervical MRI as well. He returns today in f/u.     No significant change in symptoms since prior visit.      The pts PMH, PSH, ROS, Meds, Allergies, SH, FH are all unchanged and summarized in the pts health history from last visit        PHYSICAL EXAM:   Constitutional: /85   Pulse 98   Ht 6' (1.829 m)   Wt (!) 227 lb (103 kg)   SpO2 100%   BMI 30.79 kg/m       Mental Status: A & O in no acute distress.  Affect is appropriate.  Speech is fluent.  Recent and remote memory are intact.  Attention span and concentration are normal.     Cranial Nerves: CN1: grossly intact per patient recall. CN2: No funduscopic exam performed. CN3,4 & 6: Pupillary light response, lateral and vertical gaze normal.  No nystagmus.  Visual fields are full to confrontation. CN5: Intact to touch CN7: No facial weakness, smile, facial symmetry intact. CN8: Intact to spoken voice. CN9&10: Gag reflex, uvula midline, palate rises with phonation. CN11: Shoulder shrug 5/5 intact bilaterally. CN12: Tongue midline and moves freely from side to side.     Motor: Normal bulk and tone all muscle groups of upper and lower extremities.     Sensory: Sensation intact bilaterally to light touch.     Coordination: gait intact    IMAGING:   I personally reviewed all radiographic images        CONSULTATION ASSESSMENT AND PLAN:     50 yo male with a h/o L4-5 decompression previously who presents with low back and leg pain. Also has numbness in his b/l feet. Generalized weakness. More  recently been dropping objects also having imbalance. Neck pain as well. MRI cervical spine shows foraminal narrowing moderate at C4-5 and moderate right C3-4 and C6-7. No significant central stenosis.  Recommend conservative management with Mercedes Packer at the spine center. MRI shows a L3-4 disc herniation causing possible right L4 neve compression and b/l foraminal narrowing. Previous L4-5 lami with lateral recess stenosis and possible L5 nerve root compression R>L. Based on todays discussion difficult to contribute leg pain to either L4 or L5 nerve compression. Discussed obtaining an EMG and f/u after that is obtained.     I spent more than 25 minutes in this apt, examining the pt, reviewing the scans, reviewing notes from chart, discussing treatment options with risks and benefits and coordinating care. >50 % clinic time was spent in face to face counseling and coordinating care    Mellissa Sifuentes      CC:     Anisa Bush, CNP  [address] [address]

## 2021-06-02 NOTE — PATIENT INSTRUCTIONS - HE
[facility] locations:    [place] - [phone]  [address] [place] [address]      [facility] [phone]  [address]  [place] [address]        [facility] [phone]  [address] [place] [address]      [facility] - [phone]  [address] [place] [address]      [place] - [phone]   [address] [place] [address]      Discussed the importance of core strengthening, ROM, stretching exercises with the patient and how each of these entities is important in decreasing pain.  Explained to the patient that the purpose of physical therapy is to teach the patient a home exercise program.  These exercises need to be performed every day in order to decrease pain and prevent future occurrences of pain.        ~Please call Nurse Navigation line [phone] with any questions or concerns about your treatment plan, if symptoms worsen and you would like to be seen urgently, or if you have problems controlling bladder and bowel function.  ~Follow Up Appointment time slots with Ursula Packer, CNP with the [facility], are also available at the [facility] location near [facility] on the first and third THURSDAY afternoons of each month.

## 2021-06-03 ENCOUNTER — RECORDS - HEALTHEAST (OUTPATIENT)
Dept: ADMINISTRATIVE | Facility: CLINIC | Age: 51
End: 2021-06-03

## 2021-06-03 VITALS — BODY MASS INDEX: 32.51 KG/M2 | WEIGHT: 240 LBS | HEIGHT: 72 IN

## 2021-06-03 VITALS — BODY MASS INDEX: 32.3 KG/M2 | HEIGHT: 72 IN | WEIGHT: 238.5 LBS

## 2021-06-03 VITALS
WEIGHT: 238 LBS | DIASTOLIC BLOOD PRESSURE: 85 MMHG | BODY MASS INDEX: 32.23 KG/M2 | HEART RATE: 86 BPM | HEIGHT: 72 IN | SYSTOLIC BLOOD PRESSURE: 129 MMHG | OXYGEN SATURATION: 98 %

## 2021-06-03 VITALS
BODY MASS INDEX: 32.14 KG/M2 | OXYGEN SATURATION: 98 % | SYSTOLIC BLOOD PRESSURE: 138 MMHG | WEIGHT: 237 LBS | DIASTOLIC BLOOD PRESSURE: 82 MMHG | HEART RATE: 92 BPM

## 2021-06-03 VITALS
HEIGHT: 72 IN | HEART RATE: 98 BPM | DIASTOLIC BLOOD PRESSURE: 89 MMHG | SYSTOLIC BLOOD PRESSURE: 130 MMHG | WEIGHT: 235 LBS | OXYGEN SATURATION: 99 % | BODY MASS INDEX: 31.83 KG/M2

## 2021-06-03 VITALS
DIASTOLIC BLOOD PRESSURE: 85 MMHG | OXYGEN SATURATION: 100 % | BODY MASS INDEX: 30.75 KG/M2 | HEIGHT: 72 IN | WEIGHT: 227 LBS | SYSTOLIC BLOOD PRESSURE: 129 MMHG | HEART RATE: 98 BPM

## 2021-06-03 VITALS — BODY MASS INDEX: 33.86 KG/M2 | WEIGHT: 250 LBS | HEIGHT: 72 IN

## 2021-06-03 VITALS — HEIGHT: 72 IN | BODY MASS INDEX: 32.23 KG/M2 | WEIGHT: 238 LBS

## 2021-06-03 VITALS — WEIGHT: 234 LBS | BODY MASS INDEX: 31.74 KG/M2

## 2021-06-03 NOTE — PATIENT INSTRUCTIONS - HE
DISCHARGE INSTRUCTIONS    During office hours (8:00 a.m.- 4:00 p.m.) questions or concerns may be answered  by calling [facility] Navigation Nurses at  [phone].  Messages received after hours will be returned the following business day.      In the case of an emergency, please dial 911 or seek assistance at the nearest Emergency Room/Urgent Care facility.     All Patients:    ? You may experience an increase in your symptoms for the first 2 days (It may take anywhere between 2 days- 2 weeks for the steroid to have maximum effect).    ? You may use ice on the injection site, as frequently as 20 minutes each hour if needed.    ? You may take your pain medicine.    ? You may continue taking your regular medication after your injection. If you have had a Medial Branch Block you may resume pain medication once your pain diary is completed.    ? You may shower. No swimming, tub bath or hot tub for 48 hours.  You may remove your bandaid/bandage as soon as you are home.    ? You may resume light activities, as tolerated.    ? Resume your usual diet as tolerated.    ? It is strongly advised that you do not drive for 1-3 hours post injection.    ? If you have had oral sedation:  Do not drive for 8 hours post injection.      ? If you have had IV sedation:  Do not drive for 24 hours post injection.  Do not operate hazardous machinery or make important personal/business decisions for 24 hours.      POSSIBLE STEROID SIDE EFFECTS (If steroid/cortisone was used for your procedure)    -If you experience these symptoms, it should only last for a short period      Swelling of the legs                Skin redness (flushing)       Mouth (oral) irritation     Blood sugar (glucose) levels              Sweats                      Mood changes    Headache    Sleeplessness         POSSIBLE PROCEDURE SIDE EFFECTS  -Call the Spine Center if you are concerned    Increased Pain             Increased numbness/tingling         Nausea/Vomiting            Bruising/bleeding at site        Redness or swelling                                                Difficulty walking        Weakness             Fever greater than 100.5    *In the event of a severe headache after an epidural steroid injection that is relieved by lying down, please call the [facility] to speak with a clinical staff member*

## 2021-06-03 NOTE — PROGRESS NOTES
PAIN CLINIC FOLLOW UP PROGRESS NOTE    CC:  Chief Complaint   Patient presents with     Back Pain     Leg Pain     Neck Pain       HPI  Rex Bond Jr. is a 49 y.o. male who presents for evaluation   Chief Complaint   Patient presents with     Back Pain     Leg Pain     Neck Pain    that is causing continued pain. Since the last visit the patient denies any trips to the urgent care or ED specifically for their pain. The patient denies any new medications, diagnoses since the last visit. Specific questions indicated the patient wanted addressed today include: to follow up on his ongoing chronic pain as well as discuss his last urine test. He notes that he did go back on the adderall from his PCP.       Major issues:  1. Chronic bilateral low back pain with bilateral sciatica    2. Lumbar radiculopathy    3. Chronic pain syndrome        Alleviating factors: Include- nothing really except for his tilt table at night. Notes that the gabapentin is helpful but when the doses were too high it was making him nauseated. He is now reduced his dose to 600 mg three times a day.   Aggravating factors: activity including bending, standing, laying down or lifting  The patient denies using any assistive devices to help with mobilization.  Pain level today: On a scale of 1-10, the patient rates their pain at a 6/10, described as burning down his legs bilaterally as well as his right neck.   Function Rating: affects his ability to sleep, he notes that the burning is so bad at night he cannot sleep well.      Previous Medical History  Social History     Substance and Sexual Activity   Alcohol Use Yes     Alcohol/week: 0.8 standard drinks     Types: 1 Standard drinks or equivalent per week    Comment: occasionally mostly on the weekend, rarely drinks     Social History     Substance and Sexual Activity   Drug Use No     Social History     Tobacco Use   Smoking Status Never Smoker   Smokeless Tobacco Current User     Types: Chew        Pertinent Pain Medications/interventions:  He currently takes gabapentin 600 mg three times a day     Previously tried medications:    NSAIDs: IBu as needed    Acetaminophen: none    Antidepressants: Cymbalta 60 mg per day    Opioids: previously taking percocet at bedtime for the pain control     Topicals: vudu cream for his shoulders.     Supplements: none    Muscle Relaxants: flexeril in the past.     Therapeutic interventions previously tried-   Back surgery- decompression x 2 in his low back   Injections in his low back   injection in his cervical spine.   Working with the spine center.    Psychiatric History:  Patient reports no current psychiatrist or psychologist.  Denies any suicidal ideation.  Denies any hospitalizations for mental illness. Notes that he does have ADHD and was taking adderall but stopped recently due to the nausea. He notes that he resumed this-11/11/2019    Review of Systems:  12 point systems were reviewed with pt as documented on pt health form and the patient denies any new diagnosis or changes in 12 point system review since the last visit.     Physical Exam  Vitals:    11/11/19 0847   BP: (!) 128/92   Patient Site: Right Arm   Patient Position: Sitting   Pulse: 88   Resp: 16   Weight: (!) 238 lb (108 kg)   Height: 6' (1.829 m)     General- patient is alert and oriented, in NAD, well-groomed, well-nourished  Psych- Judgment and insight normal, AOx4, recent and remote memory normal, mood and affect normal  Eyes- pupils are equal and reactive, conjunctiva is clear bilaterally, no ptosis is noted.   Respiratory- breathing is non-labored  Cardiovascular- extremities warm and well perfused, no peripheral edema or varicosities.  Musculoskeletal- gait is normal, extremities with no joint swelling, erythema, or warmth. Altered sensation in his legs bilaterally in the L4-5 distrubution as well as right sided trapezius pain described as burning and spastic radiating to his right shoulder.    Neuro- normal strength, no gait abnormalities, normal sensation to pain, temperature, light touch.  Integumentary- no rashes, dermatitis or discolorations noted throughout, no open wounds noted.    Medications    Current Outpatient Medications:      allopurinol (ZYLOPRIM) 300 MG tablet, TAKE 1 TABLET(300 MG) BY MOUTH TWICE DAILY, Disp: 180 tablet, Rfl: 3     dextroamphetamine-amphetamine (ADDERALL) 15 mg Tab, Take 15 mg by mouth 2 (two) times a day., Disp: 60 tablet, Rfl: 0     DULoxetine (CYMBALTA) 60 MG capsule, TAKE 1 CAPSULE(60 MG) BY MOUTH DAILY, Disp: 90 capsule, Rfl: 3     gabapentin (NEURONTIN) 300 MG capsule, Take 2 capsules (600 mg total) by mouth 3 (three) times a day., Disp: 180 capsule, Rfl: 3     losartan-hydrochlorothiazide (HYZAAR) 100-12.5 mg per tablet, TAKE 1 TABLET BY MOUTH EVERY DAY, Disp: 90 tablet, Rfl: 3     omeprazole (PRILOSEC) 20 MG capsule, Take 1 capsule (20 mg total) by mouth daily., Disp: 90 capsule, Rfl: 3     traZODone (DESYREL) 50 MG tablet, Take 1 tablet (50 mg total) by mouth at bedtime., Disp: 30 tablet, Rfl: 0     [START ON 12/21/2019] magnesium oxide 500 mg cap, Take 1 capsule by mouth at bedtime as needed., Disp: 30 each, Rfl: 1     [START ON 11/21/2019] pregabalin (LYRICA) 150 MG capsule, Take 1 capsule (150 mg total) by mouth 2 (two) times a day., Disp: 60 capsule, Rfl: 0    Lab:  Last UDS on 10/28/2019 had unexpected results- +ETOH. Any abnormal results have been discussed with the patient and may change the plan of care. Please see the scanned document from the outside lab under the media tab. This was reviewed with the patient.      Imaging:  No new imaging.      Recent   Dated 11/11/2019 was reviewed with the patient today.   Paper copy reviewed     Assessment:   Rex Bond  is a 49 y.o. male seen in clinic today for   Chief Complaint   Patient presents with     Back Pain     Leg Pain     Neck Pain     They are here for follow up and continued medical  management of their pain. Today we reviewed the lab work of the UDT test and the results are expected because of the prescribed narcotics found in the urine drug screen.     I have also reviewed the information obtained from the last visit encounter after the ROMY was signed and have asked any pertintent questions needed from other healthcare providers/family/patient to continue care and formulate a treatment plan.     Ongoing chronic pain in his low back- he notes that the CBD products that he uses is helpful. He is also using lyrica 150 mg two times a day. Currently the     Ongoing chronic right sided neck pain- he is using Vudu cream on the right shoulder and notes that it is helpful he uses it hourly, this is a CBD cream. He notes that he also was given some gummies and this helps take the edge off of the pain. He is taking lyrica without concern two times a day and uses trazadone to assist with sleep he notes that this is helpful. He notes that magnesium also seems to help.     Abnormal UDT he notes that he did have a few beers with the boys after work one day but was called back on for a UDT. He is open about his use, he notes it is not daily only once in awhile, 1 x a month or so.     Essential tremor- he notes that he is seeing Dr. Sifuentes for his ongoing spine workup and testing. He would likely benefit from a neurologist assessment.     Patients current MME is 0.0  Patient to call clinic for next month's prescription 5 days in advance. Based on the patients response to their previous narcotic therapy and quality of life, which includes their participation in ADLs, I recommend that the narcotics therapy continue, however the changes listed below will be incorporated into their plan of care.     Patient set goals to   1. To treat his ongoing chronic pain as well as help to maintain his independence    Plan:   Interventions-    Follow up in 4-8 weeks to evaluate the effectiveness of the treatment interventions  ordered today. Scheduling your appointment prior to leaving assists in reduction of running out of medication prior to the next appointment.     Behavioral Health with Julien or Mariya- behavorial packet for medical cannabis evaluation    You may be a candidate for medical cannabis at this time.     You do not want opioids or muscle relaxers as you feel that this makes things worse for you.     Magnesium at bedtime has been helping with sleep.     Continue the lyrica 150 mg two times a day for the burning pain     Non opioid plan of care per patient preference    As a reminder- chronic pain is generally stable, if you experience a new injury or new different pain it is expected that you present to the ED or urgent care for evaluation. DO NOT use your chronic pain medications to treat any new or different pain other then what it is intended for. We do not replace any lost or stolen prescriptions or provide early refills for overtaking your medications.     Orders placed today  Medications that were ordered today   Requested Prescriptions     Signed Prescriptions Disp Refills     pregabalin (LYRICA) 150 MG capsule 60 capsule 0     Sig: Take 1 capsule (150 mg total) by mouth 2 (two) times a day.     magnesium oxide 500 mg cap 30 each 1     Sig: Take 1 capsule by mouth at bedtime as needed.     No orders of the defined types were placed in this encounter.      UDT/SWAB:  Patient required a random Urine Drug Testing, due to the need to comply with [employer] Model Policy Guidelines and CDC Guideline for the use of any controlled substances. This is to ensure that patient is compliant with treatment, and monitor for risks such as diversion, abuse, or any other aberrant behaviors. Patient is either being considered for or taking a controlled substance. Unexpected findings will be discussed and treatment decision may be adjusted. Testing is being implemented across the board randomly w/o bias related to age, race, gender,  socioeconomic status or Restorationist affiliation.    The patient understand todays plan and has their questions answered in regards to expectations and current treatment plan.     SAFETY REMINDERS  No alcohol while taking controlled substances. Alcohol is not an illegal substance, it is unsafe to use in combination. It is a build up of substances in the body that can be extremely hazardous and may cause respirations to slow to a dangerous rate resulting in hospitalization, brain damage, or death.    Opioid medications have been associated with sharp rise in unintentional overdose and death.  Overdose is a condition characterized by the consumption in excess of a particular drug causing adverse effects. This can happen b/c you are sick, accidentally or intentionally took an extra dose, are on multiple medication that can interact. Someone took your medication and they are not use to the medication.  Symptoms of overdose include:   !breathing slow and shallow, erratic or not at all  !pinpoint pupils, hallucinations  !confusion  !muscle jerks, slack muscles   !extreme sleepiness or loss of alertness   !awake but not able to talk   !face pale or clammy, vomiting, for lighter skinned people, the skin tone turns bluish purple, for darker skinned people, it turns grayish or ashen   If in a situation where overdose is a concern engage the emergency response system (dial 911).    In one study it was noted that 80% of unintentional overdoses occurred in people who were taking a combination of opioids and benzodiazepines.    Do not sell, loan, borrow or share your opioid medication with anyone. Deaths have occurred as a result of this practice. It is illegal and patients are being prosecuted.     Prevent unexpected access/loss of medication: Keep medication locked. Only carry what you need with you.    Vesta Ramos, [facility]  [address]  [place] [address]  Ph: [phone]  Fax:  [phone]

## 2021-06-03 NOTE — PROGRESS NOTES
Patient presents to the clinic today for a follow up with Vesta Ramos CNP regarding back. Patient describes their pain as burning and sharp. Her/His function score is 7.

## 2021-06-03 NOTE — TELEPHONE ENCOUNTER
Medication being requested: Trazodone  Last visit date: 11/11/19 Provider: [facility]  Next visit date: not scheduled Provider: [facility]  Expected follow up: 4-8 weeks  MTM visit (Pain Center) date: No  Pertinent between visit information about requested medication (telephone, mychart, prior authorization, concerns): 11/14 appt with Isabelle, 11/25 injection at spine center  Last date prescribed: 10/22/19  Provider responsible: [facility]  Spoke with patient: Jackson  Script being sent to provider - dates and quantity: 11/25/19-12/25/19 30 tablets (30 day supply)  Requested Prescriptions     Pending Prescriptions Disp Refills     traZODone (DESYREL) 50 MG tablet [Pharmacy Med Name: TRAZODONE 50MG TABLETS] 30 tablet 0     Sig: Take 1 tablet (50 mg total) by mouth at bedtime.       Pharmacy cued: [facility]

## 2021-06-03 NOTE — TELEPHONE ENCOUNTER
Controlled Substance Refill Request  Medication:   Requested Prescriptions     Pending Prescriptions Disp Refills     dextroamphetamine-amphetamine (ADDERALL) 15 mg Tab 60 tablet 0     Sig: Take 15 mg by mouth 2 (two) times a day.     Date Last Fill: 10/25/19  Pharmacy: [facility]   Submit electronically to pharmacy  Controlled Substance Agreement on File:   Encounter-Level CSA Scan Date:    There are no encounter-level csa scan date.       Last office visit: 9/5/2019 Anisa Bush, RATNA Peterson RN BSBA [facility] Triage/Med Refill 11/25/2019 6:53 AM

## 2021-06-03 NOTE — PROGRESS NOTES
NEUROSURGERY FOLLOWUP  NOTE    Rex Bond Jr. comes today in f/u after prior apt on 10/10/19 for cervical and lumbar stenosis.  50 yo male with a h/o L4-5 decompression previously who presents with low back and leg pain. Also has numbness in his b/l feet. Generalized weakness. More recently been dropping objects also having imbalance. Neck pain as well. MRI cervical spine shows foraminal narrowing moderate at C4-5 and moderate right C3-4 and C6-7. No significant central stenosis.  Recommend conservative management with Mercedes Packer at the spine center. MRI shows a L3-4 disc herniation causing possible right L4 neve compression and b/l foraminal narrowing. Previous L4-5 lami with lateral recess stenosis and possible L5 nerve root compression R>L. Based on todays discussion difficult to contribute leg pain to either L4 or L5 nerve compression. Discussed obtaining an EMG and f/u after that is obtained.     Since our last visit, he underwent a cervical facet injection with some improvement in his neck pain. Today he has right shoulder and neck pain that radiates into his anterior chest. No arm pain, numbness, or weakness. He also complains of diffuse leg burning mostly at night. He does have foot numbness that is all the time. Notes he has blurred vision despite wearing glasses as well as incomplete emptying of his bladder and worsening imbalance.     The pts PMH, PSH, ROS, Meds, Allergies, SH, FH are all unchanged and summarized in the pts health history from last visit        PHYSICAL EXAM:   Constitutional: /85   Pulse 86   Ht 6' (1.829 m)   Wt (!) 238 lb (108 kg)   SpO2 98%   BMI 32.28 kg/m       Mental Status: A & O in no acute distress.  Affect is appropriate.  Speech is fluent.  Recent and remote memory are intact.  Attention span and concentration are normal.     Cranial Nerves: CN1: grossly intact per patient recall. CN2: No funduscopic exam performed. CN3,4 & 6: Pupillary light response,  lateral and vertical gaze normal.  No nystagmus.  Visual fields are full to confrontation. CN5: Intact to touch CN7: No facial weakness, smile, facial symmetry intact. CN8: Intact to spoken voice. CN9&10: Gag reflex, uvula midline, palate rises with phonation. CN11: Shoulder shrug 5/5 intact bilaterally. CN12: Tongue midline and moves freely from side to side.     Motor: No pronator drift of upper extremity. Normal bulk and tone all muscle groups of upper and lower extremities.     Sensory: Sensation intact bilaterally to light touch except diminished in b/l feet     Coordination:  Imbalance with tandem gait     IMAGING:   I personally reviewed all radiographic images        CONSULTATION ASSESSMENT AND PLAN:     48 yo male with a h/o L4-5 decompression previously who presents with low back pain, diffuse leg burning, incomplete emptying of his bladder, imbalance, and right shoulder pain. Since our last visit, EMG showed possible very mild sensory or sensorimotor peripheral polyneuropathy without radiculopathy. Recommend a neurology consult given symptoms and EMG results. 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 F/u after injection and neurology consult.     I spent more than 25 minutes in this apt, examining the pt, reviewing the scans, reviewing notes from chart, discussing treatment options with risks and benefits and coordinating care. >50 % clinic time was spent in face to face counseling and coordinating care    Mellissa Sifuentes      CC:     Anisa Bush, CNP  [address] [facility] [address]  [facility] [address]

## 2021-06-03 NOTE — PROGRESS NOTES
Rex Bond Jr. presents today for a follow up after a recent EMG on 10/23/2019. He states he has more pain in his right shoulder, but no other changes since last office visit.   Vishal Hinton LPN

## 2021-06-03 NOTE — PATIENT INSTRUCTIONS - HE
Plan:   Interventions-    Follow up in 4-8 weeks to evaluate the effectiveness of the treatment interventions ordered today. Scheduling your appointment prior to leaving assists in reduction of running out of medication prior to the next appointment.     Behavioral Health with Julien or Mariya- behavorial packet for medical cannabis evaluation    You may be a candidate for medical cannabis at this time.     You do not want opioids or muscle relaxers as you feel that this makes things worse for you.     Magnesium at bedtime has been helping with sleep.     Continue the lyrica 150 mg two times a day for the burning pain     As a reminder- chronic pain is generally stable, if you experience a new injury or new different pain it is expected that you present to the ED or urgent care for evaluation. DO NOT use your chronic pain medications to treat any new or different pain other then what it is intended for. We do not replace any lost or stolen prescriptions or provide early refills for overtaking your medications.     Orders placed today  Medications that were ordered today   Requested Prescriptions     Signed Prescriptions Disp Refills     pregabalin (LYRICA) 150 MG capsule 60 capsule 0     Sig: Take 1 capsule (150 mg total) by mouth 2 (two) times a day.     magnesium oxide 500 mg cap 30 each 1     Sig: Take 1 capsule by mouth at bedtime as needed.     No orders of the defined types were placed in this encounter.      UDT/SWAB:  Patient required a random Urine Drug Testing, due to the need to comply with [place] [employer] Policy Guidelines and CDC Guideline for the use of any controlled substances. This is to ensure that patient is compliant with treatment, and monitor for risks such as diversion, abuse, or any other aberrant behaviors. Patient is either being considered for or taking a controlled substance. Unexpected findings will be discussed and treatment decision may be adjusted. Testing is being implemented across  the board randomly w/o bias related to age, race, gender, socioeconomic status or Quaker affiliation.    The patient understand todays plan and has their questions answered in regards to expectations and current treatment plan.     SAFETY REMINDERS  No alcohol while taking controlled substances. Alcohol is not an illegal substance, it is unsafe to use in combination. It is a build up of substances in the body that can be extremely hazardous and may cause respirations to slow to a dangerous rate resulting in hospitalization, brain damage, or death.    Opioid medications have been associated with sharp rise in unintentional overdose and death.  Overdose is a condition characterized by the consumption in excess of a particular drug causing adverse effects. This can happen b/c you are sick, accidentally or intentionally took an extra dose, are on multiple medication that can interact. Someone took your medication and they are not use to the medication.  Symptoms of overdose include:   !breathing slow and shallow, erratic or not at all  !pinpoint pupils, hallucinations  !confusion  !muscle jerks, slack muscles   !extreme sleepiness or loss of alertness   !awake but not able to talk   !face pale or clammy, vomiting, for lighter skinned people, the skin tone turns bluish purple, for darker skinned people, it turns grayish or ashen   If in a situation where overdose is a concern engage the emergency response system (dial 911).    In one study it was noted that 80% of unintentional overdoses occurred in people who were taking a combination of opioids and benzodiazepines.    Do not sell, loan, borrow or share your opioid medication with anyone. Deaths have occurred as a result of this practice. It is illegal and patients are being prosecuted.     Prevent unexpected access/loss of medication: Keep medication locked. Only carry what you need with you.    Vesta Ramos, [facility]  [address]  [address]  [place] [address]  Ph: [phone]  Fax: [phone]

## 2021-06-04 VITALS
HEIGHT: 72 IN | WEIGHT: 242 LBS | DIASTOLIC BLOOD PRESSURE: 68 MMHG | SYSTOLIC BLOOD PRESSURE: 110 MMHG | OXYGEN SATURATION: 97 % | HEART RATE: 89 BPM | BODY MASS INDEX: 32.78 KG/M2

## 2021-06-04 VITALS
SYSTOLIC BLOOD PRESSURE: 130 MMHG | BODY MASS INDEX: 32.82 KG/M2 | WEIGHT: 242 LBS | HEART RATE: 101 BPM | OXYGEN SATURATION: 98 % | DIASTOLIC BLOOD PRESSURE: 80 MMHG

## 2021-06-04 NOTE — TELEPHONE ENCOUNTER
Prior Authorization Request  Who s requesting:  Patient  Pharmacy Name and Location:   [id number]  Medication Name:   dextroamphetamine-amphetamine (ADDERALL) 15 mg Tab 60 tablet 0 12/27/2019     Sig - Route: Take 15 mg by mouth 2 (two) times a day. - Oral    Sent to pharmacy as: dextroamphetamine-amphetamine 15 mg tablet (Adderall)    Earliest Fill Date: 12/27/2019    E-Prescribing Status: Receipt confirmed by pharmacy (12/27/2019  4:30 PM CST)        Insurance Plan: [employer]  Insurance Member ID Number:    ID# [id number]  Informed patient that prior authorizations can take up to 10 business days for response:   Yes  Okay to leave a detailed message: Yes      Please expedite as patient is out of above medication.

## 2021-06-04 NOTE — TELEPHONE ENCOUNTER
"Call from pt requesting repeat cervical injection. Pt had Right C4-5 TFESI on 11/25/2019 that gave him \"great relief. It was like a huge weight was lifted off me.\" for approximately 2-3 weeks. Pt adds \"I really wish it would've lasted longer but i'll take any relief I can get\". This was his 4th steroid injection since 8/2019. In the past week his same pain has returned. It is located in his right neck and shoulder and radiates to his anterior chest.     Pt aware that provider is not in clinic today. He is ok with waiting until Monday for a response from Ursula Packer CNP. Please advise.       "

## 2021-06-04 NOTE — TELEPHONE ENCOUNTER
Controlled Substance Refill Request  Medication:   Requested Prescriptions     Pending Prescriptions Disp Refills     dextroamphetamine-amphetamine (ADDERALL) 15 mg Tab 60 tablet 0     Sig: Take 15 mg by mouth 2 (two) times a day.     Date Last Fill: 11.25.19  [facility]   Submit electronically to pharmacy  Controlled Substance Agreement on File:   Encounter-Level CSA Scan Date:    There are no encounter-level csa scan date.       Last office visit: Last office visit pertaining to requested medication was 11.12.19.

## 2021-06-04 NOTE — TELEPHONE ENCOUNTER
Central PA team  [phone]  Pool: HE PA MED ([id number])          PA has been initiated.       PA form completed and faxed insurance via Cover My Meds     Key:  Manually faxed     Medication:  dextroamphetamine-amphetamine (ADDERALL) 15 mg Tab    Insurance:  [employer]        Response will be received via fax and may take up to 5-10 business days depending on plan

## 2021-06-04 NOTE — TELEPHONE ENCOUNTER
"Call to pt with provider's response. \"The mailbox is full and cannot accept any messages at this time. Goodbye\".   "

## 2021-06-04 NOTE — TELEPHONE ENCOUNTER
Call placed to pt. Discussed provider's response. He stated understanding. Declined follow-up appt for medication management at this time. He will call and schedule appt if he changes his mind. He is scheduled with neurosurgery on 1/23/2020.

## 2021-06-04 NOTE — TELEPHONE ENCOUNTER
At this point given this was the fourth steroid injection since 8/2019 I do not recommend repeating an injection, I see he is scheduled with neurosurgery 1/23/2020 again which will likely be the next step.    If we do more than 4 steroid injections in a 365-day timeframe it can cause other changes such as weakening of the bones/worsening osteoporosis.  He can follow-up in clinic to discuss other options such as medication management until he sees neurosurgery again.

## 2021-06-04 NOTE — TELEPHONE ENCOUNTER
Caller states patient have only oneday of worth medication requesting to process as soon as possible.  Refill Request  Did you contact pharmacy: No  Medication name:   Requested Prescriptions     Pending Prescriptions Disp Refills     dextroamphetamine-amphetamine (ADDERALL) 15 mg Tab 60 tablet 0     Sig: Take 15 mg by mouth 2 (two) times a day.   Who prescribed the medication: Anisa Bush CNP  Pharmacy Name and Location: [facility] # [id number]  Is patient out of medication: No.  1 days left  Patient notified refills processed in 72 hours:  yes  Okay to leave a detailed message: no

## 2021-06-05 ENCOUNTER — RECORDS - HEALTHEAST (OUTPATIENT)
Dept: MRI IMAGING | Facility: CLINIC | Age: 51
End: 2021-06-05

## 2021-06-05 VITALS
DIASTOLIC BLOOD PRESSURE: 92 MMHG | WEIGHT: 248.8 LBS | HEART RATE: 97 BPM | BODY MASS INDEX: 33.74 KG/M2 | OXYGEN SATURATION: 98 % | SYSTOLIC BLOOD PRESSURE: 138 MMHG

## 2021-06-05 VITALS
BODY MASS INDEX: 33.82 KG/M2 | SYSTOLIC BLOOD PRESSURE: 158 MMHG | RESPIRATION RATE: 20 BRPM | DIASTOLIC BLOOD PRESSURE: 111 MMHG | HEART RATE: 104 BPM | TEMPERATURE: 98.3 F | OXYGEN SATURATION: 98 % | WEIGHT: 249.4 LBS

## 2021-06-05 DIAGNOSIS — Z98.890 OTHER POSTPROCEDURAL STATES: ICD-10-CM

## 2021-06-05 DIAGNOSIS — M54.50 LOW BACK PAIN: ICD-10-CM

## 2021-06-05 DIAGNOSIS — M79.609 PAIN IN SOFT TISSUES OF LIMB: ICD-10-CM

## 2021-06-05 NOTE — PROGRESS NOTES
NEUROSURGERY FOLLOWUP  NOTE    Rex Bond Jr. is a  50 yo male with a h/o L4-5 decompression previously who presents with low back pain, diffuse leg burning, incomplete emptying of his bladder, imbalance, and right shoulder pain. Since our last visit, EMG showed possible very mild sensory or sensorimotor peripheral polyneuropathy without radiculopathy. He was recommended a neurology consult given symptoms and EMG results. Also underwent a right C4-5 TFESI.     He had short term improvement following a right C4-5 TFESI on 11/25/19. Continues to have burning in his thighs to his feet and in his hands. Mostly at night. Also having numbess and tinling in b/l shoulders and feels his left leg is not picking up as much.      The pts PMH, PSH, ROS, Meds, Allergies, SH, FH are all unchanged and summarized in the pts health history from last visit        PHYSICAL EXAM:   Constitutional: /68   Pulse 89   Ht 6' (1.829 m)   Wt (!) 242 lb (109.8 kg)   SpO2 97%   BMI 32.82 kg/m       Mental Status: A & O in no acute distress.  Affect is appropriate.  Speech is fluent.  Recent and remote memory are intact.  Attention span and concentration are normal.     Cranial Nerves: CN1: grossly intact per patient recall. CN2: No funduscopic exam performed. CN3,4 & 6: Pupillary light response, lateral and vertical gaze normal.  No nystagmus.  Visual fields are full to confrontation. CN5: Intact to touch CN7: No facial weakness, smile, facial symmetry intact. CN8: Intact to spoken voice. CN9&10: Gag reflex, uvula midline, palate rises with phonation. CN11: Shoulder shrug 5/5 intact bilaterally. CN12: Tongue midline and moves freely from side to side.     Motor: No pronator drift of upper extremity. Normal bulk and tone all muscle groups of upper and lower extremities.     Sensory: Sensation intact bilaterally to light touch.      Coordination:   Gait intact    IMAGING:   I personally reviewed all radiographic images         CONSULTATION ASSESSMENT AND PLAN:     48 yo male with a h/o L4-5 decompression previously who presents with low back pain, diffuse leg burning, incomplete emptying of his bladder, imbalance, and right shoulder pain. Since our last visit, EMG showed possible very mild sensory or sensorimotor peripheral polyneuropathy without radiculopathy. He saw neurology and has labs pending. Plans to f/u in the next few weeks. Had short term improvement following a right C4-5 TFESI on 11/25/19. He will f/u with neurology and start PT.     I spent more than 25 minutes in this apt, examining the pt, reviewing the scans, reviewing notes from chart, discussing treatment options with risks and benefits and coordinating care. >50 % clinic time was spent in face to face counseling and coordinating care    Mellissa Sifuentes      CC:     Anisa Bush, CNP  [address]  [facility] [address]

## 2021-06-05 NOTE — TELEPHONE ENCOUNTER
Controlled Substance Refill Request  Medication Name:   Requested Prescriptions     Pending Prescriptions Disp Refills     dextroamphetamine-amphetamine (ADDERALL) 15 mg Tab 60 tablet 0     Sig: Take 15 mg by mouth 2 (two) times a day.     Date Last Fill: 12/27/19  [facility]ed [facility]: [facility]  Submit electronically to pharmacy  Controlled Substance Agreement on file:   Encounter-Level CSA Scan Date:    There are no encounter-level csa scan date.        Last office visit:  1/14/20

## 2021-06-05 NOTE — PROGRESS NOTES
Assessment/Plan:    1. Neck pain  Neck pain consistent with trapezius strain along with known degenerative arthritis of cervical spine with moderate to severe right C4-C5 foraminal stenosis along with mild C3-C4 and mild C6-C7 cervical foraminal stenosis.  Patient has been seen through spine care.  States unable to receive any further steroid injections.  Wants second opinion.  Referred to see Dr. Meño Rodríguez with [facility] orthopedics.  Did instruct patient to try increasing gabapentin 300 mg from once a day up to twice a day.  Has had prior intolerance to 3 times daily dosing as well as higher milligram dose.    2. Cervical radiculopathy  As above, referred to [facility] orthopedics for further evaluation and treatment options.  - Ambulatory referral to Orthopedic Surgery    3. Tremor  Tremor noted.  Has been followed by Dr. Darling.  Undergoing further evaluation currently.  Question of familial tremor.  Father does have history of Parkinson's disease however no evidence of rigidity, cogwheeling of upper extremities etc. identified.        Subjective:    Rex SLOAN Bond Jr. is seen today for concerns of neck pain.  More right-sided.  Symptoms since last August.  Perhaps about a month after receiving prior injection in his back.  MRI reviewed from October 2019 with moderate to severe right-sided cervical foraminal stenosis at C4-C5 as well as mild disease at C3-C4 and C6-C7.  Patient states he cannot receive any more steroid injections through spine care clinic.  No hand weakness right upper extremity.  Does have a tremor.  Progressive worsening.  Describes father as having Parkinson's disease however neurologist did not feel Parkinson's related symptom at this time and is completing further work-up.  Patient with underlying history of hypertension, hyperlipidemia, ADD without hyperactivity and known history of chronic pain syndrome.  Using gabapentin typically 300 mg once daily in the morning.  Does not feel  like he is used Lyrica however listed as potential side effect for discontinuing.  Higher doses or increased frequency of gabapentin tends to cause more side effects.  Comprehensive review of systems as above otherwise all negative.        Past Surgical History:   Procedure Laterality Date     APPENDECTOMY  2003    Description: Appendectomy;  Recorded: 02/10/2009;  Comments: 11-0-03     BACK SURGERY  2014    L3,4,5 surgically repaired     excision of abdominal wall tumor      found out was pancreatic cancer     HAND SURGERY       KNEE SURGERY Right      OTHER SURGICAL HISTORY      tumor removed from pancreas outer lining     SPINE SURGERY       VASECTOMY  2000        Family History   Problem Relation Age of Onset     Hypertension Mother      Thyroid disease Mother      Varicose Veins Mother      Diabetes Father      Hypertension Father      Heart attack Father      Heart disease Paternal Grandfather      Stroke Paternal Grandmother      Stroke Maternal Grandmother      Throat cancer Maternal Grandmother         Laryngeal cancer        Past Medical History:   Diagnosis Date     Anxiety      Arthritis      Crohn disease (H)      Diverticulosis      Esophageal reflux      Gout      Herniated disc      HTN (hypertension)      Hyperlipidemia      Hypokalemia      Motion sickness      Pancreatic cancer (H)     mass removed at [facility] 4/2012        Social History     Tobacco Use     Smoking status: Never Smoker     Smokeless tobacco: Current User     Types: Chew   Substance Use Topics     Alcohol use: Yes     Alcohol/week: 0.8 standard drinks     Types: 1 Standard drinks or equivalent per week     Comment: occasionally mostly on the weekend, rarely drinks     Drug use: No        Current Outpatient Medications   Medication Sig Dispense Refill     allopurinol (ZYLOPRIM) 300 MG tablet TAKE 1 TABLET(300 MG) BY MOUTH TWICE DAILY 180 tablet 3     dextroamphetamine-amphetamine (ADDERALL) 15 mg Tab Take 15 mg by mouth 2 (two) times a  day. 60 tablet 0     dextroamphetamine-amphetamine (ADDERALL) 15 mg Tab Take 15 mg by mouth 2 (two) times a day. 60 tablet 0     DULoxetine (CYMBALTA) 60 MG capsule TAKE 1 CAPSULE(60 MG) BY MOUTH DAILY 90 capsule 3     losartan-hydrochlorothiazide (HYZAAR) 100-12.5 mg per tablet TAKE 1 TABLET BY MOUTH EVERY DAY 90 tablet 3     omeprazole (PRILOSEC) 20 MG capsule Take 1 capsule (20 mg total) by mouth daily. 90 capsule 3     gabapentin (NEURONTIN) 300 MG capsule Take 2 capsules (600 mg total) by mouth 3 (three) times a day. 180 capsule 3     magnesium oxide 500 mg cap Take 1 capsule by mouth at bedtime as needed. 30 each 1     traZODone (DESYREL) 50 MG tablet Take 1 tablet (50 mg total) by mouth at bedtime. 30 tablet 0     No current facility-administered medications for this visit.           Objective:    Vitals:    01/14/20 1435   BP: 130/80   Pulse: (!) 101   SpO2: 98%   Weight: (!) 242 lb (109.8 kg)      Body mass index is 32.82 kg/m .    Alert.  No apparent distress.  Chest clear.  Cardiac exam regular.  Resting tremor involving had not primarily.  Distal CMS appears intact upper extremities.  No significant tremor involving extremities.        EXAM: MR CERVICAL SPINE WO CONTRAST  LOCATION: [facility]  DATE/TIME: 10/7/2019 2:35 PM     INDICATION: Indication not listed - see reason for exam. Generalized weakness.  COMPARISON: Cervical spine MRI 5/17/2018.  TECHNIQUE: Without IV contrast.     FINDINGS:   1 mm retrolisthesis of C4 on C5. Vertebral body heights are maintained. Nonpathologic marrow signal. Visualized posterior fossa structures are grossly within normal limits. No abnormal cord signal. Increased edema involving the right-sided C5-C6 facet   joints and adjacent soft tissues. The paraspinal soft tissues are grossly within normal limits. The flow voids of the vertebral arteries are present.     C2-C3: Normal disc height. No herniation. Mild bilateral facet arthropathy. No spinal canal or neural  foraminal stenosis.      C3-C4: Normal intervertebral disc height. Small central/right paracentral disc protrusion. Mild bilateral facet arthropathy. Right uncovertebral spurring. Mild narrowing of the right lateral recess. No spinal canal narrowing. Moderate right neural   foraminal narrowing. Mild left neural foraminal narrowing.     C4-C5: Normal intervertebral disc height. Small broad-based disc bulge. Moderate bilateral facet arthropathy. Right uncovertebral spurring. Mild spinal canal narrowing. Moderate to severe right neural foraminal narrowing. Mild to moderate left neural   foraminal narrowing.     C5-C6: Normal intervertebral disc height. There is a broad-based disc bulge. Severe right and moderate left facet arthropathy. No spinal canal narrowing. Mild to moderate bilateral neural foraminal narrowing.     C6-C7: Normal intervertebral disc height. There is a small broad-based disc bulge. Moderate bilateral facet arthropathy. Right uncovertebral spurring. No spinal canal narrowing. Moderate right neural foraminal narrowing. Mild left neural foraminal   narrowing.     C7-T1: Normal disc height. No herniation. Moderate bilateral facet arthropathy. No spinal canal or neural foraminal stenosis.     IMPRESSION:   1.  Mild to moderate degenerative changes of the cervical spine, stable to minimally increased compared to previous cervical spine MRI 7/17/2018.  2.  Mild spinal canal narrowing at C4-C5.  3.  Mild narrowing of the right lateral recess at C3-C4.  4.  Moderate to severe right and mild to moderate left neural foraminal narrowing at C4-C5.  5.  Moderate right and mild left neural foraminal narrowing at C3-C4 and C6-C7.  6.  Interval increase in the edema involving the right-sided C5-C6 facet joints and adjacent soft tissues. These are likely on degenerative basis in the clinical absence of infection.        This note has been dictated using voice recognition software and as a result may contain minor  grammatical errors and unintended word substitutions.

## 2021-06-06 NOTE — TELEPHONE ENCOUNTER
Controlled Substance Refill Request  Medication Name:   Requested Prescriptions     Pending Prescriptions Disp Refills     dextroamphetamine-amphetamine (ADDERALL) 15 mg Tab 60 tablet 0     Sig: Take 15 mg by mouth 2 (two) times a day.     Date Last Fill: 1/28/20  [facility]ed [facility]  Submit electronically to pharmacy  Controlled Substance Agreement on file:   Encounter-Level CSA Scan Date:    There are no encounter-level csa scan date.        Last office visit:  1/14/20

## 2021-06-06 NOTE — TELEPHONE ENCOUNTER
RN Triage:   Refill for the duloxetine   Pharmacy has no refills for medication.     Miranda Thompson RN, BSN [facility] Triage Nurse

## 2021-06-06 NOTE — TELEPHONE ENCOUNTER
Patient Returning Call  Reason for call:  Patient called back.  Information relayed to patient:  n/a  Patient has additional questions:  Yes  If YES, what are your questions/concerns:  Calling on the status of this medication.  Please address and call patient when sent to the pharmacy.  Okay to leave a detailed message?: Yes

## 2021-06-07 NOTE — PROGRESS NOTES
"Rex Bond Jr. is a 50 y.o. male who is being evaluated via a billable video visit.      The patient has been notified of following:     \"This video visit will be conducted via a call between you and your physician/provider. We have found that certain health care needs can be provided without the need for an in-person physical exam.  This service lets us provide the care you need with a video conversation.  If a prescription is necessary we can send it directly to your pharmacy.  If lab work is needed we can place an order for that and you can then stop by our lab to have the test done at a later time.    Video visits are billed at different rates depending on your insurance coverage. Please reach out to your insurance provider with any questions.    If during the course of the call the physician/provider feels a video visit is not appropriate, you will not be charged for this service.\"    Patient has given verbal consent to a Video visit? Yes    Patient would like to receive their AVS by AVS Preference: Louise.    Patient would like the video invitation sent by: Text to cell phone: [phone]    Will anyone else be joining your video visit? No        Video Start Time: 8:10 AM     Additional provider notes:  Assessment and Plan:     1. Attention deficit disorder (ADD) without hyperactivity  This is controlled.  He continues Adderall as prescribed.  Will monitor use.  He is to follow-up in 6 months.  - dextroamphetamine-amphetamine (ADDERALL) 15 mg Tab; Take 15 mg by mouth 2 (two) times a day.  Dispense: 60 tablet; Refill: 0    2. Hypertension  This is controlled.  He continues losartan-hydrochlorothiazide as prescribed.  He is to follow-up for lab work once COVID-19 pandemic improves.  - losartan-hydrochlorothiazide (HYZAAR) 100-12.5 mg per tablet; Take 1 tablet by mouth daily.  Dispense: 90 tablet; Refill: 1    3. Gastroesophageal reflux disease, esophagitis presence not specified  This is controlled with " omeprazole.    4. Gout  He denies any recent flares.  He continues allopurinol.    5. Chronic pain syndrome  Patient continues to see neurosurgery.  He is content with the plan and will follow-up in 6 months for medication management or sooner with any further concerns.        Subjective:     Rex is a 50 y.o. male presenting for a video visit.  Patient has multiple comorbidities including Crohn's disease, GERD, hypertension, hyperlipidemia, anxiety, ADD, chronic pain.  Patient is taking losartan-hydrochlorothiazide 100-12.5 mg daily for hypertension.  He has been monitoring his blood pressure at home and it has been 120/80.  He denies chest pain, shortness of breath with exertion, edema, orthopnea, syncope.  Patient has chronic pain from cervical radiculopathy.  He has been seeing a neurosurgeon.  He is taking omeprazole for esophageal reflux.  Patient has a history of gout.  He denies any recent gout flares.  He has cut out the consumption of beer.  He continues allopurinol.  Patient has ADD and is taking Adderall 15 mg twice daily.  He is tolerating the medication well without any side effects.  Patient feels as though the medication allows him to focus and complete tasks in a timely manner.    Review of Systems: A complete 14 point review of systems was obtained and is negative or as stated in the history of present illness.    Social History     Socioeconomic History     Marital status:      Spouse name: Lizzette     Number of children: 2     Years of education: Not on file     Highest education level: Not on file   Occupational History     Occupation: construction     Employer: [employer]   Social Needs     Financial resource strain: Not on file     Food insecurity     Worry: Not on file     Inability: Not on file     Transportation needs     Medical: Not on file     Non-medical: Not on file   Tobacco Use     Smoking status: Never Smoker     Smokeless tobacco: Current User     Types: Chew    Substance and Sexual Activity     Alcohol use: Yes     Alcohol/week: 0.8 standard drinks     Types: 1 Standard drinks or equivalent per week     Comment: occasionally mostly on the weekend, rarely drinks     Drug use: No     Sexual activity: Yes     Partners: Female     Birth control/protection: Surgical     Comment: vasectomy   Lifestyle     Physical activity     Days per week: Not on file     Minutes per session: Not on file     Stress: Not on file   Relationships     Social connections     Talks on phone: Not on file     Gets together: Not on file     Attends Baptism service: Not on file     Active member of club or organization: Not on file     Attends meetings of clubs or organizations: Not on file     Relationship status: Not on file     Intimate partner violence     Fear of current or ex partner: Not on file     Emotionally abused: Not on file     Physically abused: Not on file     Forced sexual activity: Not on file   Other Topics Concern     Not on file   Social History Narrative    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           Active Ambulatory Problems     Diagnosis Date Noted     Serum Enzyme Levels - ALT (SGPT) Elevated      Crohn's Disease      Esophageal Reflux      Gout      Hypertension      Herniated Intervertebral Disc      Diverticulosis      Soft Tissue Neoplasm Of The Abdomen      Testicular Neoplasm      Maisha-rectal abscess 04/14/2015     Diverticulosis of intestine without bleeding 10/06/2015     Internal hemorrhoids 10/06/2015     Chewing tobacco use 03/23/2016     Obesity 03/23/2016     Hyperlipidemia 03/23/2016     Anxiety 03/23/2016     Obesity (BMI 35.0-39.9) with comorbidity (H) 10/30/2018     Attention deficit disorder (ADD) without hyperactivity 09/06/2019     Chronic pain syndrome 10/22/2019     Resolved Ambulatory Problems     Diagnosis Date Noted     Hypokalemia      Cramp of limb      Skin Symptoms      Headache      Anxiety      Acute Gout      Foot Pain (Soft Tissue)       Acrochordon      Motion sickness      Lower Back Pain      Hyperlipidemia      Bright Red Blood Per Rectum      Arthropathy Of The Ankle / Foot      Furuncle of forehead 03/01/2017     Past Medical History:   Diagnosis Date     Arthritis      Crohn disease (H)      Diverticulosis      Gout      Herniated disc      HTN (hypertension)      Hypokalemia      Pancreatic cancer (H)        Family History   Problem Relation Age of Onset     Hypertension Mother      Thyroid disease Mother      Varicose Veins Mother      Diabetes Father      Hypertension Father      Heart attack Father      Heart disease Paternal Grandfather      Stroke Paternal Grandmother      Stroke Maternal Grandmother      Throat cancer Maternal Grandmother         Laryngeal cancer       Objective:     There were no vitals taken for this visit.       GENERAL: healthy, alert and no distress  EYES: Eyes grossly normal to inspection, conjunctivae and sclerae normal  HENT: normal cephalic/atraumatic.  External ears, nose and mouth without ulcers or lesions.  NECK: no asymmetry, masses or scars  RESP: no audible wheeze, cough, or visible cyanosis.  No visible retractions or increased work of breathing.  Able to speak fully in complete sentences.  MS: no gross musculoskeletal defects noted.  Normal range of motion.  NEURO: Cranial nerves grossly intact, mentation intact and speech normal  PSYCH: mentation appears normal, affect normal/bright, judgement and insight intact, normal speech and appearance well-groomed      Video-Visit Details    Type of service:  Video Visit    Video End Time (time video stopped): 8:25 AM   Originating Location (pt. Location): Home    Distant Location (provider location):  [facility] MED[facility]     Platform used for Video Visit: Mel Bush CNP

## 2021-06-07 NOTE — TELEPHONE ENCOUNTER
Who is calling:  Pt's wife Lizzette  Reason for Call:  Wife was checking on refill request.  Writer noted assistant has sent it to provider.  Date of last appointment with primary care: N/A  Okay to leave a detailed message: No

## 2021-06-07 NOTE — TELEPHONE ENCOUNTER
Controlled Substance Refill Request  Medication Name:   Requested Prescriptions     Pending Prescriptions Disp Refills     dextroamphetamine-amphetamine (ADDERALL) 15 mg Tab 60 tablet 0     Sig: Take 15 mg by mouth 2 (two) times a day.     Date Last Fill: 2/27/20  [facility]  Submit electronically to pharmacy  Controlled Substance Agreement on file:   Encounter-Level CSA Scan Date:    There are no encounter-level csa scan date.        Last office visit:  1/14/20

## 2021-06-07 NOTE — TELEPHONE ENCOUNTER
Controlled Substance Refill Request  Medication Name:   Requested Prescriptions     Pending Prescriptions Disp Refills     dextroamphetamine-amphetamine (ADDERALL) 15 mg Tab 60 tablet 0     Sig: Take 15 mg by mouth 2 (two) times a day.     Date Last Fill: 3/27/20  [facility]ed [facility]  Submit electronically to pharmacy  Controlled Substance Agreement on file:   Encounter-Level CSA Scan Date:    There are no encounter-level csa scan date.        Last office visit:  1/14/20

## 2021-06-08 NOTE — PROGRESS NOTES
Rex is a 46 y.o. male presenting to the clinic for concerns of a plugged sensation in his ears for 3 weeks.  He feels as though the ear canal is swollen.  He denies any recent swimming.  He has not noticed any drainage from the ear.  He's not had any recent cold symptoms including rhinorrhea, headache, stomachache, nausea, vomiting, fever, postnasal drainage.  He has not tried any over-the-counter products for his symptoms.  Patient was taking fluoxetine for anxiety.  3 weeks ago he stopped the medication because he ran out.  He feels as though the medication was not helping.  He continues to experience irritability.  He denies thoughts of suicide.    Review of Systems: A complete 14 point review of systems was obtained and is negative or as stated in the history of present illness.    Social History     Social History     Marital status:      Spouse name: Lizzette     Number of children: 2     Years of education: N/A     Occupational History     [employer]     Social History Main Topics     Smoking status: Never Smoker     Smokeless tobacco: Current User     Types: Chew     Alcohol use 0.5 oz/week     1 drink(s) per week      Comment: occasionally mostly on the weekend, rarely drinks     Drug use: No     Sexual activity: Yes     Partners: Female     Birth control/ protection: Surgical      Comment: vasectomy     Other Topics Concern     Not on file     Social History Narrative    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               Active Ambulatory Problems     Diagnosis Date Noted     Serum Enzyme Levels - ALT (SGPT) Elevated      Crohn's Disease      Cramp Of Limb      Anxiety      Esophageal Reflux      Acute Gout      Gout      Hyperlipidemia      Hypertension      Herniated Intervertebral Disc      Diverticulosis      Soft Tissue Neoplasm Of The Abdomen      Testicular Neoplasm      Bright Red Blood Per Rectum      Arthropathy Of The Ankle / Foot      Maisha-rectal abscess  04/14/2015     Diverticulosis of intestine without bleeding 10/06/2015     Internal hemorrhoids 10/06/2015     Chewing tobacco use 03/23/2016     Obesity 03/23/2016     Hyperlipidemia 03/23/2016     Anxiety 03/23/2016     Resolved Ambulatory Problems     Diagnosis Date Noted     Hypokalemia      Skin Symptoms      Headache      Foot Pain (Soft Tissue)      Acrochordon      Motion sickness      Lower Back Pain      Past Medical History   Diagnosis Date     Crohn disease      Diverticulosis      Gout      Herniated disc      HTN (hypertension)      Hypokalemia      Pancreatic cancer        Family History   Problem Relation Age of Onset     Hypertension Mother      Diabetes Father      Heart disease Paternal Grandfather      Stroke Paternal Grandmother      Stroke Maternal Grandmother      Hypertension Father      Thyroid disease Mother      Throat cancer Maternal Grandmother      Laryngeal cancer     Hypertension Father        OBJECTIVE:     Visit Vitals     /62 (Patient Site: Left Arm, Patient Position: Sitting, Cuff Size: Adult Large)     Pulse 78     Temp 98.1  F (36.7  C)     Ht 6' (1.829 m)     Wt (!) 258 lb 12.8 oz (117.4 kg)     SpO2 96%     BMI 35.1 kg/m2       Patient is alert, in no obvious distress.   Skin: Warm, dry.  No lesions or rashes.  Skin turgor rapid return.   HEENT:  Head normocephalic, atraumatic.  Eyes normal.  PERRL.  EOM's intact.  No nystagmus. Ears both ears are impacted with cerumen.  I removed a minimal amount with a curet. Ear lavage was performed.  After ear lavage, ears appear normal.  Nose patent, mucosa pink.  Oropharynx mucosa pink.  No lesions or tonsillar enlargement.   Neck: Supple, no lymphadenopathy.   Lungs:  Clear to auscultation. Respirations even and unlabored.  No wheezing or rales noted.   Heart:  Regular rate and rhythm.  No murmurs.      ASSESSMENT AND PLAN:     1. Anxiety  Discussed treatment options.  We'll start bupropion. Educated on its indications and side  effects.  He is to follow up in 1 month for medication management.  - buPROPion (WELLBUTRIN XL) 150 MG 24 hr tablet; Take 1 tablet (150 mg total) by mouth every morning.  Dispense: 30 tablet; Refill: 0    2. Bilateral impacted cerumen  Discussed symptomatic treatment.  Recommend Debrox drops in the future.  He is content with the plan.

## 2021-06-08 NOTE — TELEPHONE ENCOUNTER
Controlled Substance Refill Request  Medication Name:   Requested Prescriptions     Pending Prescriptions Disp Refills     dextroamphetamine-amphetamine (ADDERALL) 15 mg Tab 60 tablet 0     Sig: Take 15 mg by mouth 2 (two) times a day.     Date Last Fill: 4/30/20  [facility]ed [facility]  Submit electronically to pharmacy  Controlled Substance Agreement on file:   Encounter-Level CSA Scan Date:    There are no encounter-level csa scan date.        Last office visit:  4/30/20

## 2021-06-09 NOTE — PROGRESS NOTES
Rex is a 46 y.o. male presenting to the clinic for medication management.  Patient has anxiety and has taken sertraline in the past.  He felt as though this was not providing assistance anymore so we tried bupropion.  Patient states his symptoms worsened with bupropion.  He has become more irritable and has less patience.  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  Patient did stop the bupropion on Thursday.  He would like to restart her medication.  States his wife has noticed a significant difference.  He denies thoughts of suicide.    Review of Systems: A complete 14 point review of systems was obtained and is negative or as stated in the history of present illness.    Social History     Social History     Marital status:      Spouse name: Lizzette     Number of children: 2     Years of education: N/A     Occupational History     [employer]     Social History Main Topics     Smoking status: Never Smoker     Smokeless tobacco: Current User     Types: Chew     Alcohol use 0.5 oz/week     1 drink(s) per week      Comment: occasionally mostly on the weekend, rarely drinks     Drug use: No     Sexual activity: Yes     Partners: Female     Birth control/ protection: Surgical      Comment: vasectomy     Other Topics Concern     Not on file     Social History Narrative    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               Active Ambulatory Problems     Diagnosis Date Noted     Serum Enzyme Levels - ALT (SGPT) Elevated      Crohn's Disease      Cramp Of Limb      Anxiety      Esophageal Reflux      Acute Gout      Gout      Hyperlipidemia      Hypertension      Herniated Intervertebral Disc      Diverticulosis      Soft Tissue Neoplasm Of The Abdomen      Testicular Neoplasm      Bright Red Blood Per Rectum      Arthropathy Of The Ankle / Foot      Maisha-rectal abscess 04/14/2015     Diverticulosis of intestine without bleeding 10/06/2015      Internal hemorrhoids 10/06/2015     Chewing tobacco use 03/23/2016     Obesity 03/23/2016     Hyperlipidemia 03/23/2016     Anxiety 03/23/2016     Resolved Ambulatory Problems     Diagnosis Date Noted     Hypokalemia      Skin Symptoms      Headache      Foot Pain (Soft Tissue)      Acrochordon      Motion sickness      Lower Back Pain      Past Medical History:   Diagnosis Date     Anxiety      Crohn disease      Diverticulosis      Esophageal reflux      Gout      Herniated disc      HTN (hypertension)      Hyperlipidemia      Hypokalemia      Motion sickness      Pancreatic cancer        Family History   Problem Relation Age of Onset     Hypertension Mother      Diabetes Father      Heart disease Paternal Grandfather      Stroke Paternal Grandmother      Stroke Maternal Grandmother      Hypertension Father      Thyroid disease Mother      Throat cancer Maternal Grandmother      Laryngeal cancer     Hypertension Father        OBJECTIVE:     Visit Vitals     /82     Pulse 79     Wt (!) 260 lb (117.9 kg)  Comment: declined     SpO2 98%     BMI 35.26 kg/m2       Patient is alert, in no obvious distress.   Skin: Warm, dry.    Lungs:  Clear to auscultation. Respirations even and unlabored.  No wheezing or rales noted.   Heart:  Regular rate and rhythm.  No murmurs.  Abdomen: Soft, nontender.  No organomegaly. Bowel sounds normoactive. No guarding or masses noted.       ASSESSMENT AND PLAN:     1. Anxiety  Discussed treatment options.  Patient has discontinued bupropion.  We'll start fluoxetine 20 mg daily.  [facility] for medication management or sooner with any further concerns.  He is content with the plan.  - FLUoxetine (PROZAC) 20 MG tablet; Take 1 tablet (20 mg total) by mouth daily.  Dispense: 30 tablet; Refill: 0

## 2021-06-09 NOTE — PROGRESS NOTES
Assessment:     1. Furuncle  clindamycin (CLEOCIN) 300 MG capsule       Plan:     1. Furuncle  Treatment will be as follows.  The patient should apply warm moist packs to the area and follow up in 10 days  - clindamycin (CLEOCIN) 300 MG capsule; Take 1 capsule (300 mg total) by mouth 3 (three) times a day for 10 days.  Dispense: 30 capsule; Refill: 0      Subjective:   Patient developed a swelling on the posterior occipital area of his head approximately 3 days ago which is now becoming sore and swollen and he is also noticing another bump behind his ear, more towards the back of his hairline.  He and his friends were planning on incising and draining this area, but his wife insisted he be checked.  Exam reveals a furuncle at the right posterior occipital region of the scalp and a secondary lymph node below that area.  We will treat him with oral antibiotics and warm moist packs.  He'll be followed up 7-10 days to make sure that the infections under control.  He was advised to check with us if the swelling becomes worse or he develops other constitutional or systemic signs of generalized infection.  I did do a cardiovascular examination was negative.  I do not palpate any other lymph nodes in the head, neck or the axilla.  All questions that were asked were answered    Review of Systems: A complete 14 point review of systems was obtained and is negative or as stated in the history of present illness.    Past Medical History:   Diagnosis Date     Anxiety      Crohn disease      Diverticulosis      Esophageal reflux      Gout      Herniated disc      HTN (hypertension)      Hyperlipidemia      Hypokalemia      Motion sickness      Pancreatic cancer     mass removed at [place] 4/2012     Family History   Problem Relation Age of Onset     Hypertension Mother      Thyroid disease Mother      Diabetes Father      Hypertension Father      Heart disease Paternal Grandfather      Stroke Paternal Grandmother      Stroke Maternal  Grandmother      Throat cancer Maternal Grandmother      Laryngeal cancer     Past Surgical History:   Procedure Laterality Date     APPENDECTOMY  2003    Description: Appendectomy;  Recorded: 02/10/2009;  Comments: 11-0-03     BACK SURGERY  2014    L3,4,5 surgically repaired     excision of abdominal wall tumor      found out was pancreatic cancer     KNEE SURGERY Right      VASECTOMY  2000     Social History   Substance Use Topics     Smoking status: Never Smoker     Smokeless tobacco: Current User     Types: Chew     Alcohol use 0.5 oz/week     1 Standard drinks or equivalent per week      Comment: occasionally mostly on the weekend, rarely drinks         Objective:     Visit Vitals     BP (!) 148/100     Pulse 92     SpO2 96%       General Appearance:  Normal  Head:  Normal  Ears: Normal  Eyes:  Normal  Nose:  Normal  Throat:  Normal  Neck:  Normal  Back:  Normal  Chest/Breast:Normal  Lungs:  Normal  Heart:  Normal  Abdomen:  Normal  Musculoskeletal:  Normal  Lymphatic:  Normal  Skin/Hair/Nails:  Patient has a lesion on the right posterior occipital skull near the edge of his receding hairline which measures 1-1/2 cm in circumference.  He has all the appearance of a furuncle is due to an ingrown hair which is taken.  A U-turn and continued to secrete sebum and now has become secondarily infected.  There is a satellite palpable lymph node in the right posterior occipital chain.  Patient will be treated with systemic antibiotics orally.  Specifically Cleocin.  He is to follow-up with Anisa Bush 10 days.  He may use warm moist packs, but definitely should not try to I and D this on his own, as was his original plan  Neurologic:  Normal  Extremities:  Normal  Genitourinary: Normal  Pulses:  Normal           This note has been dictated using voice recognition software. Any grammatical or context distortions are unintentional and inherent to the the software.

## 2021-06-09 NOTE — TELEPHONE ENCOUNTER
Controlled Substance Refill Request  Medication Name:   Requested Prescriptions     Pending Prescriptions Disp Refills     dextroamphetamine-amphetamine (ADDERALL) 15 mg Tab 60 tablet 0     Sig: Take 15 mg by mouth 2 (two) times a day.     Date Last Fill: 6/2/20  [facility]  Submit electronically to pharmacy  Controlled Substance Agreement on file:   Encounter-Level CSA Scan Date:    There are no encounter-level csa scan date.        Last office visit:  4/30/20

## 2021-06-10 NOTE — TELEPHONE ENCOUNTER
Controlled Substance Refill Request  Medication Name:   Requested Prescriptions     Pending Prescriptions Disp Refills     dextroamphetamine-amphetamine (ADDERALL) 15 mg Tab 60 tablet 0     Sig: Take 15 mg by mouth 2 (two) times a day.     Date Last Fill: 6/30/20  [facility]ed [facility]  Submit electronically to pharmacy  Controlled Substance Agreement on file:   Encounter-Level CSA Scan Date:    There are no encounter-level csa scan date.        Last office visit:  4/30/20
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
## 2021-06-11 NOTE — TELEPHONE ENCOUNTER
Controlled Substance Refill Request  Medication Name:   Requested Prescriptions     Pending Prescriptions Disp Refills     dextroamphetamine-amphetamine (ADDERALL) 15 mg Tab 60 tablet 0     Sig: Take 15 mg by mouth 2 (two) times a day.      Date Last Fill: 07/31/2020  Requested [facility]acy: [facility]  Submit electronically to pharmacy  Controlled Substance Agreement on file:   Encounter-Level CSA Scan Date:    There are no encounter-level csa scan date.        Last office visit:  04/30/2020         Questioning if refill can be expedited, as patient will soon be out of medication.

## 2021-06-11 NOTE — TELEPHONE ENCOUNTER
Anisa pt is needing a refill on his adderall medication. I did not see the time frame he was stating about this medication. Last ordered by you was on 07/31/2020 with no refills. If appropriate, please order and sign medication for him. Thank you.
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
## 2021-06-11 NOTE — PROGRESS NOTES
Assessment / Plan:     Diagnoses and all orders for this visit:    Lumbalgia  -     gabapentin (NEURONTIN) 300 MG capsule; Take 1 capsule (300 mg total) by mouth daily.  Dispense: 30 capsule; Refill: 12    Lumbar radiculopathy  -     gabapentin (NEURONTIN) 300 MG capsule; Take 1 capsule (300 mg total) by mouth daily.  Dispense: 30 capsule; Refill: 12    Spondylosis of cervical region without myelopathy or radiculopathy  -     gabapentin (NEURONTIN) 300 MG capsule; Take 1 capsule (300 mg total) by mouth daily.  Dispense: 30 capsule; Refill: 12    Cervical radicular pain  -     gabapentin (NEURONTIN) 300 MG capsule; Take 1 capsule (300 mg total) by mouth daily.  Dispense: 30 capsule; Refill: 12          Rex LISA Varun Cervantes is a 47 y.o. y.o. male with past medical history significant for hypertension, periaortic mass, schwannoma, Crohn's disease, anxiety, hyperlipidemia, who presents today for follow-up regarding chronic neck pain, chronic low back pain, status post lumbar decompression May 2014 by Dr. Tena.  Patient is doing well with the chronic neck and low back pain with gabapentin 300 mg at nighttime.  He currently is not taking  pain medication.  He continues to do home exercises, and stretching.  I prescribed a refill for gabapentin 3 mg 1 tablet daily.  No red flags.    A shared decision making plan was used.  The patient's values and choices were respected.  The following represents what was discussed and decided upon by the physician and the patient.      1.  DIAGNOSTIC TESTS: I reviewed the lumbar MRI imaging and the report with the patient today.  He verbalizes understanding.  2.  PHYSICAL THERAPY: Patient will continue on physical therapy [facility] program.  3.  MEDICATIONS: Patient will continue on gabapentin 300 mg at nighttime.    4.  INTERVENTIONS: No therapeutic injections at this time.    5.  PATIENT EDUCATION: I thoroughly discussed the plan with the patient today.  He verbalizes understanding and  agrees with the plan.  6.  FOLLOW-UP: Patient will follow up with me on as-needed basis.  All questions were answered.      ROBERT Barba, MPA-C      Subjective:     Rex Bond Jr. is a 47 y.o. male who presents today for follow-up regarding chronic neck pain, and chronic low back pain.  Patient was seen last by Tara Doan CNP back in February 2016.  Patient with history of lumbar decompression back in May 2014 by Dr. Tena.  Patient stated that he has been doing well with his surgery in the lower back.  He has been taking gabapentin 300 mg every night and that seems to help with his symptoms.  Patient is not taking any pain medication.  He is here today for refill on his gabapentin.  At this time he reports 0 out of 10 intensity pain in his neck and in his lower back.  Patient states that his symptoms are aggravated by bending over, standing for too long or lifting and rates it at 2-3 out of 10 intensity on its worst.  His symptoms are alleviated by taking gabapentin 300 mg at nighttime, performing certain exercises to the lower back into the neck.  He does not feel that he has any need for any therapeutic steroid injection.  Patient had labs done back in October 2016 to the kidney function that were within normal limits.  Liver function was within normal limits back in 2015.Patient denies urinary or bowel incontinence, unintentional weight loss, saddle anesthesia, fever or chills, balance difficulties.      Review of Systems:  Very mild neck and low back pain.Patient denies urinary or bowel incontinence, unintentional weight loss, saddle anesthesia, fever or chills, balance difficulties.       Objective:   CONSTITUTIONAL:  Vital signs as above.  No acute distress.  The patient is well nourished and well groomed.    PSYCHIATRIC:  The patient is awake, alert, oriented to person, place and time.  The patient is answering questions appropriately with clear speech.  Normal affect.  SKIN:  Skin over the  face, posterior torso, bilateral upper and lower extremities is clean, dry, intact without rashes.  MUSCULOSKELETAL:  Gait is non-antalgic.  The patient is able to heel and toe walk without any difficulty.  No L4-L5, L5-S1 tenderness over the lumbar paraspinal muscles.      The patient has 5/5 strength for the bilateral hip flexors, knee flexors/extensors, ankle dorsiflexors/plantar flexors, ankle evertors/invertors.    NEUROLOGICAL:  2/4 patellar, medial hamstring, achilles reflexes which are symmetric bilaterally.  No ankle clonus bilaterally.  Sensation to light touch is intact in the bilateral L4, L5, and S1 dermatomes.       RESULTS:    MR CERVICAL SPINE W WO CONTRAST  11/26/2014 12:51 PM     INDICATION: Neck pain and bilateral shoulder and arm pain. Arm weakness. History pancreas cancer.  TECHNIQUE: Routine.?Additional postgadolinium T1 sequences were obtained.  CONTRAST: 20 mL Magnevist IV.  COMPARISON: None.     FINDINGS:  C2-C3: Normal disc height. Facet joints negative. No significant central canal or neural foraminal stenosis.     C3-C4: Normal disc height. Mild spondylotic ridging and slight annular bulge mild degenerative facet arthropathy on the left.. No significant central canal or neural foraminal stenosis.     C4-C5: Normal disc height. Slight spondylotic ridging posterolaterally on the right. Annular bulge and likely a very small shallow central disc protrusion. Slight degenerative facet arthropathy. Mild right foraminal stenosis. No significant central canal   or left foraminal stenosis..     C5-C6: Normal disc height. Slight posterolateral spondylotic ridging. There is small shallow central disc protrusion. Mild degenerative facet arthropathy. Very mild central canal stenosis. No significant foraminal stenosis..     C6-C7: Normal disc height. Mild annular bulge. Facet joints negative. No significant central canal or neural foraminal stenosis.     C7-T1: Normal disc height. Facet joints negative.  No significant central canal or neural foraminal stenosis.     Craniocervical junction negative. Cervical cord negative. No abnormal enhancement. No significant marrow signal abnormality. No significant paravertebral soft tissue abnormality.     IMPRESSION:   CONCLUSION:  1.  Mild degenerative changes cervical spine as described above.  2.  At C5-C6 there is very mild central canal stenosis secondary to a small shallow central disc protrusion.  3.  At C4-C5 there is mild right foraminal stenosis due to degenerative changes.  4.  No high-grade central canal or foraminal stenosis. No clear evidence for neural impingement.  5.  No evidence for metastatic disease to the cervical spine.    [facility]  MR LUMBAR SPINE W WO CONTRAST  9/12/2014, 11:48 AM     INDICATION: Bilateral lower extremity pain. Low back pain.  TECHNIQUE: Multiplanar T1- and T2-weighted images were obtained through the lumbar spine pre and post administration of contrast.  CONTRAST: 20 mL Magnevist IV.  SEDATION: None.  COMPARISON: MRI lumbar spine 12/12/2013.     FINDINGS: Examination assumes 5 lumbar type vertebral bodies. Lateral alignment is normal. Lumbar lordosis is preserved. Slight retrolisthesis L3 with respect to L4 is again seen, stable. Allowing for mild endplate degenerative changes and Schmorl node   formation, vertebral body heights are preserved and stable in the interval. Moderate Modic type II endplate degenerative changes are seen along the apposing L4-L5 endplates with more mild Modic type II endplate changes posteriorly at L3-L4. Small   hemangioma upper aspect L2 vertebral body. Visualized marrow space otherwise unremarkable.     New postsurgical changes are seen within the dorsal paraspinal soft tissues, and there have been interval laminectomies at the L3-L4 and L4-L5 levels. Canal is well decompressed dorsally at these levels. No abnormal collections are noted. Dorsal   paraspinal musculature and psoas muscles  otherwise unremarkable. Visualized aorta is normal in caliber.     Distal spinal cord and cauda equina are unremarkable. Conus terminates at the L1 level.     T12-L1: Mild ventral interspace narrowing and central loss of normal disc T2 prolongation. No disc bulge, canal or foraminal stenosis. Facets unremarkable.     L1-L2: Mild ventral interspace narrowing. Mild central loss of normal disc T2 prolongation. No significant disc bulge, canal or foraminal stenosis. Facets unremarkable.     L2-L3: Normal disc height. Mild loss of normal disc T2 prolongation. Slight right greater than left foraminal disc bulging mildly effaces the caudal neural foraminal but does not result in significant foraminal stenosis. There is dorsal prominence of the   epidural fat with mild AP flattening of the thecal sac. Mild hypertrophic facet changes.     L3-L4: Mild interspace narrowing. Loss of normal disc T2 prolongation. Broad-based posterior disc bulge and posterior interbody spurring. Mild facet arthropathy. There is effacement of the caudal neural foramina moderate right (series 102, image 15) and   borderline left foraminal narrowing. Degree of foraminal stenosis has progressed bilaterally. Interval laminectomy with dorsal decompression of the canal. Bilateral lateral recess narrowing. There is abutment of the traversing L4 nerve roots, left   greater than right (series 105, image 19).     L4-L5: Mild interspace narrowing. Loss of normal disc T2 prolongation. Broad-based posterior disc bulge and interbody spurring partially efface the caudal neural foraminal. Mild facet arthropathy effaces the dorsal superior neural foramina. Borderline   left and at least mild right neural foraminal narrowing. Degree of foraminal narrowing appears similar to prior. There is bilateral lateral recess narrowing without clear mass effect upon the traversing L5 nerve roots.     L5-S1: Shallow interspace versus mild interspace narrowing. Mild loss of  normal disc T2 prolongation. No disc bulge. At least mild facet arthropathy and ligamentum flavum thickening. Borderline left foraminal narrowing, stable.     IMPRESSION:   IMPRESSION:   1. Interval decompressive laminectomies L3-L4 and L4-L5 with dorsal decompression of the canal at these levels. There remains lateral recess narrowing at L3-L4 with abutment of the traversing L4 nerve roots, left more so than the right.  2. There has been mild progression of the degree of foraminal stenosis at L3-L4 with at least moderate or moderate to severe foraminal stenosis now seen on the right and more mild to borderline foraminal stenosis seen on the left. Progression   predominantly relates to increased mass effect by hypertrophic facet degenerative changes.  3. Combination of hypertrophic facet changes and disc osteophyte complex at L4-L5 results in borderline left and at least mild right foraminal narrowing, similar to prior.  4. Epidural lipomatosis effaces the dorsal thecal sac at L2-L3 and mildly narrows the thecal sac.

## 2021-06-12 NOTE — TELEPHONE ENCOUNTER
Controlled Substance Refill Request  Medication Name:   Requested Prescriptions     Pending Prescriptions Disp Refills     dextroamphetamine-amphetamine (ADDERALL) 15 mg Tab 60 tablet 0     Sig: Take 15 mg by mouth 2 (two) times a day.     Date Last Fill: 9/4/2020  [facility]ed [facility]  Submit electronically to pharmacy  Controlled Substance Agreement on file:   Encounter-Level CSA Scan Date:    There are no encounter-level csa scan date.        Last office visit:  4/30/2020 virtual visit with Anisa Bush CNP

## 2021-06-12 NOTE — PROGRESS NOTES
"eRx Bond Jr. is a 50 y.o. male who is being evaluated via a billable telephone visit.      The patient has been notified of following:     \"This telephone visit will be conducted via a call between you and your physician/provider. We have found that certain health care needs can be provided without the need for a physical exam.  This service lets us provide the care you need with a short phone conversation.  If a prescription is necessary we can send it directly to your pharmacy.  If lab work is needed we can place an order for that and you can then stop by our lab to have the test done at a later time.    Telephone visits are billed at different rates depending on your insurance coverage. During this emergency period, for some insurers they may be billed the same as an in-person visit.  Please reach out to your insurance provider with any questions.    If during the course of the call the physician/provider feels a telephone visit is not appropriate, you will not be charged for this service.\"    Patient has given verbal consent to a Telephone visit? Yes    What phone number would you like to be contacted at? [phone]    Additional provider notes:  50-year-old gentleman with known history of sinusitis is evaluated via telephone for concerns for recurrent sinus infection.  He states symptoms started on Saturday.  He started with some nasal congestion and drainage.  He has felt some mild headaches and also a mild sore throat.  He is just starting to cough today.  No pain in his chest.  No fevers that he is aware of.  His last sinus infection was a year ago.  He does work in construction.  He has had no known exposures to [employer] that he is aware of.  His daughter is 23 and is more social and out there and potentially could have had some exposures.    Assessment/Plan:  1. Acute non-recurrent maxillary sinusitis  Symptoms are consistent with recurrent acute sinusitis.  Will treat with Augmentin twice daily for " 10 days.  With the current COVID pandemic, I do recommend COVID testing.  An order is placed for him to have this testing done.  He is advised to isolate until he has had the testing done and has results available.  Recommend symptomatic relief with Tylenol and nasal saline rinses.  Follow-up with any further questions or concerns.  - amoxicillin-clavulanate (AUGMENTIN) 875-125 mg per tablet; Take 1 tablet by mouth 2 (two) times a day for 10 days.  Dispense: 20 tablet; Refill: 0  - Symptomatic COVID-19 Virus (CORONAVIRUS) PCR; Future        Phone call duration:  7 minutes    Gerda Beaevr, [facility]

## 2021-06-12 NOTE — TELEPHONE ENCOUNTER
Received referral for neurosurgery. Patient has previously seen Dr. Sifuentes. He said he will cb to schedule. Just needs a f/u appt dai Sifuentes.

## 2021-06-12 NOTE — TELEPHONE ENCOUNTER
Refill Approved    Rx renewed per Medication Renewal Policy. Medication was last renewed on 10/20/19.    Mattie Santana, [facility] Triage/Med Refill 10/24/2020     Requested Prescriptions   Pending Prescriptions Disp Refills     omeprazole (PRILOSEC) 20 MG capsule [Pharmacy Med Name: OMEPRAZOLE 20MG CAPSULES] 90 capsule 3     Sig: TAKE 1 CAPSULE(20 MG) BY MOUTH DAILY       GI Medications Refill Protocol Passed - 10/22/2020  8:15 PM        Passed - PCP or prescribing provider visit in last 12 or next 3 months.     Last office visit with prescriber/PCP: 9/5/2019 Anisa Bush CNP OR same dept: 1/14/2020 Earle Leach MD OR same specialty: 9/22/2020 Issac Lange MD  Last physical: 10/30/2018 Last MTM visit: Visit date not found   Next visit within 3 mo: Visit date not found  Next physical within 3 mo: Visit date not found  Prescriber OR PCP: Anisa Bush CNP  Last diagnosis associated with med order: 1. Esophageal reflux  - omeprazole (PRILOSEC) 20 MG capsule [Pharmacy Med Name: OMEPRAZOLE 20MG CAPSULES]; TAKE 1 CAPSULE(20 MG) BY MOUTH DAILY  Dispense: 90 capsule; Refill: 3    If protocol passes may refill for 12 months if within 3 months of last provider visit (or a total of 15 months).

## 2021-06-12 NOTE — TELEPHONE ENCOUNTER
Controlled Substance Refill Request  Medication Name:   Requested Prescriptions     Pending Prescriptions Disp Refills     dextroamphetamine-amphetamine (ADDERALL) 15 mg Tab 60 tablet 0     Sig: Take 15 mg by mouth 2 (two) times a day.     Date Last Fill: 10/4/2020  [facility]ed [facility]  Submit electronically to pharmacy  Controlled Substance Agreement on file:   Encounter-Level CSA Scan Date:    There are no encounter-level csa scan date.        Last office visit:  10/16/2020 Earle Leach MD

## 2021-06-12 NOTE — TELEPHONE ENCOUNTER
Controlled Substance Refill Request  Medication Name:   Requested Prescriptions     Pending Prescriptions Disp Refills     dextroamphetamine-amphetamine (ADDERALL) 15 mg Tab 60 tablet 0     Sig: Take 15 mg by mouth 2 (two) times a day.     Date Last Fill: 9/4/2020  Is patient out of medication?: N/A - electronic request  Patient notified refills processed within 3 business days: N/A - electronic request  [facility]ed [facility]  Submit electronically to pharmacy  Controlled Substance Agreement on file:   Encounter-Level CSA Scan Date:    There are no encounter-level csa scan date.        Last office visit:  9/22/2020

## 2021-06-12 NOTE — PROGRESS NOTES
"Rex Bond Jr. is a 50 y.o. male who is being evaluated via a billable video visit.      The patient has been notified of following:     \"This video visit will be conducted via a call between you and your physician/provider. We have found that certain health care needs can be provided without the need for an in-person physical exam.  This service lets us provide the care you need with a video conversation.  If a prescription is necessary we can send it directly to your pharmacy.  If lab work is needed we can place an order for that and you can then stop by our lab to have the test done at a later time.    Video visits are billed at different rates depending on your insurance coverage. Please reach out to your insurance provider with any questions.    If during the course of the call the physician/provider feels a video visit is not appropriate, you will not be charged for this service.\"    Patient has given verbal consent to a Video visit? Yes  How would you like to obtain your AVS? AVS Preference: [employer].  If dropped by the video visit, the video invitation should be sent to: Text to cell phone: [phone]  not using [employer], tect to cell   Will anyone else be joining your video visit? No        Video Start Time: 11:11 AM    Additional provider notes: GENERAL: Healthy, alert and no distress  EYES: Eyes grossly normal to inspection. No discharge or erythema, or obvious scleral/conjunctival abnormalities.  RESP: No audible wheeze, cough, or visible cyanosis.  No visible retractions or increased work of breathing.    NEURO: Cranial nerves grossly intact. Mentation and speech appropriate for age.  PSYCH: Mentation appears normal, affect normal/bright, judgement and insight intact, normal speech and appearance well-groomed     - Lizzette  2 daughters - 18 and 20  Work: [employer]  No smoke  EtOH: \"some beers\"  Surgeries: pancreatic mass excised; perianal surgery x 5  Mom - Cynthia Blanton - " Rex - NEELAM   2 parish - Anjana and Pao  1 ade (adopted) - Doni     Video visit completed today.  Controlled substance follow-up office visit.  [place] prescription monitoring program website reviewed.  Most recent refills on Adderall 15 mg twice daily were filled on September 4 as well as October 4.  Patient does admit that he does not need refills today.  Is wondering about increasing dose however to 20 mg and perhaps would not need to take an afternoon dose.  States that he took an extended release 30 mg dose in the past but did not like that variety and how he did with it.  Chart review without recent urine tox screen completion.  Patient is followed for hypertension and continues losartan hydrochlorothiazide 100/12.5 daily.  Blood pressure recently checked and was normal however does not recall what the blood pressure reading was.  Has been seen by Dr. Meño Arndt historically with cervical radiculopathy issues with moderate to severe right-sided foraminal stenosis at C4-C5 as well as milder issues at C3-C4 and C6-C7.  Follows with Dr. 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  Gabapentin 300 mg using 2 capsules 3 times daily.  This does cause some sedation.  Comprehensive review of systems as above otherwise all negative.      1. Attention deficit disorder (ADD) without hyperactivity  Patient seen for ADHD follow-up.  In general doing well with Adderall 15 mg twice daily with improved focus and concentration however patient is interested in considering increasing dose to 20mg/tab.  I have instructed patient to follow-up with his PCP in order to review this request otherwise does need controlled substance agreement form and urine tox screen etc. to be completed.  [place] prescription monitoring program use appears appropriate at this time.  Patient apparently had not tolerated the extended release 30 mg variety as well historically.    2. Hypertension  Continues use of losartan hydrochlorothiazide  100/12.5 daily for hypertension management and will reassess in office for blood pressure check next 3 to 6 months at time of physical exam.    3. Tremor  Continues to follow with Dr. Darling.  Gabapentin 300 mg using 2 capsules 3 times daily however some sedation.  May need to taper down to 300 mg 3 times daily but should discuss with Dr. Darling's office as well.           Video-Visit Details    Type of service:  Video Visit    Video End Time (time video stopped): 11:28 AM  Originating Location (pt. Location): Home    Distant Location (provider location):  [facility]     Platform used for Video Visit: [employer]      Earle Leach MD

## 2021-06-13 NOTE — PROGRESS NOTES
Chief Complaint   Patient presents with     Conjunctivitis     R eye redness x5 days. starting to lose vision,      Headache     Pressure headache on R side of head x3 weeks        HPI:    Rex Bond Jr. is a 50 y.o. male with history of hypertension, Crohn's disease, and GE reflux who presents today to the [facility] walk[facility]in [facility] complaining of right eye pain, redness,  visual change to the right eye over the past five days.  He also notes that he has a right-sided headache for the past 5 weeks.  The patient reports photophobia of the right eye and that eye is blinking frequently.  He states that right eye vision appears cloudy or smoky in the peripheral vision and that there are floaters in the visual field of the right eye.  He does not think that he has a foreign body in his eye.  He notes that he had a similar situation arise almost exactly a year ago with his right eye.  At that time, he developed redness in the eye, photophobia, and had a considerable work-up to determine the cause of the eye pain and redness.  He reports that he never had a final diagnosis for the cause of this right eye irritation last year.  The situation resolved but started again 5 days ago.   He says that headache pain is in the right temple and is fairly constant at a 6 out of 10 pain scale.  He takes between four and eight 200 mg tablets at a time to relieve the headache pain with little relief.  And has had some intermittent nausea and vomiting with his headache pain.  I did review with the patient the importance of taking, at most, 800 mg ibuprofen at a time (4 tablets) every 8 hours.    History obtained from the patient.    Problem List:  2019-10: Chronic pain syndrome  2019-09: Attention deficit disorder (ADD) without hyperactivity  2018-10: Obesity (BMI 35.0-39.9) with comorbidity (H)  2017-03: Furuncle of forehead  2016-03: Chewing tobacco use  2016-03: Obesity  2016-03: Hyperlipidemia  2016-03: Anxiety  2015-10:  Diverticulosis of intestine without bleeding  2015-10: Internal hemorrhoids  2015-04: Maisha-rectal abscess  Serum Enzyme Levels - ALT (SGPT) Elevated  Crohn's Disease  Hypokalemia  Cramp of limb  Skin Symptoms  Headache  Anxiety  Esophageal Reflux  Acute Gout  Foot Pain (Soft Tissue)  Gout  Acrochordon  Motion sickness  Lower Back Pain  Hyperlipidemia  Hypertension  Herniated Intervertebral Disc  Diverticulosis  Soft Tissue Neoplasm Of The Abdomen  Testicular Neoplasm  Bright Red Blood Per Rectum  Arthropathy Of The Ankle / Foot      Past Medical History:   Diagnosis Date     Anxiety      Arthritis      Crohn disease (H)      Diverticulosis      Esophageal reflux      Gout      Herniated disc      HTN (hypertension)      Hyperlipidemia      Hypokalemia      Motion sickness      Pancreatic cancer (H)     mass removed at [facility] 4/2012       Social History     Tobacco Use     Smoking status: Never Smoker     Smokeless tobacco: Current User     Types: Chew   Substance Use Topics     Alcohol use: Yes     Alcohol/week: 0.8 standard drinks     Types: 1 Standard drinks or equivalent per week     Comment: occasionally mostly on the weekend, rarely drinks       Review of Systems   Constitutional: Negative for activity change, chills and fever.   HENT: Negative for congestion, ear pain, hearing loss, rhinorrhea, sinus pressure, sinus pain and sore throat.    Eyes: Positive for photophobia, pain, redness and visual disturbance. Negative for discharge.        Right eye pain and redness for past five days.   Respiratory: Negative for cough, chest tightness and shortness of breath.    Cardiovascular: Positive for chest pain.   Gastrointestinal: Negative for abdominal pain.   Neurological: Positive for headaches. Negative for dizziness, seizures, weakness and light-headedness.        Fairly constant right temporal headache for about 5 weeks with occasional nausea and an episode of vomiting last week.   Hematological: Positive for  adenopathy.   Psychiatric/Behavioral: Negative.        Vitals:    12/08/20 1536   BP: (!) 158/111   Patient Site: Right Arm   Patient Position: Sitting   Cuff Size: Adult Regular   Pulse: (!) 104   Resp: 20   Temp: 98.3  F (36.8  C)   TempSrc: Oral   SpO2: 98%   Weight: (!) 249 lb 6.4 oz (113.1 kg)       Physical Exam  Constitutional:       Appearance: Normal appearance.   HENT:      Head: Normocephalic and atraumatic.      Comments: Right sided tenderness over temple.      Nose: Nose normal.      Mouth/Throat:      Mouth: Mucous membranes are moist.   Eyes:      General: No scleral icterus.        Right eye: No discharge.         Left eye: No discharge.      Extraocular Movements:      Right eye: Normal extraocular motion.      Left eye: Normal extraocular motion.      Conjunctiva/sclera:      Right eye: Right conjunctiva is injected. No exudate or hemorrhage.     Left eye: Left conjunctiva is not injected. No exudate or hemorrhage.     Comments: Right eye with notable erythema.  Left eye without injection, discomfort.  Patient is blinking right eye frequently.   Neck:      Musculoskeletal: Normal range of motion and neck supple.   Cardiovascular:      Rate and Rhythm: Normal rate and regular rhythm.   Pulmonary:      Effort: Pulmonary effort is normal.   Skin:     General: Skin is warm and dry.      Capillary Refill: Capillary refill takes less than 2 seconds.   Neurological:      General: No focal deficit present.      Mental Status: He is alert.          Hearing Screening    125Hz 250Hz 500Hz 1000Hz 2000Hz 3000Hz 4000Hz 6000Hz 8000Hz   Right ear:            Left ear:               Visual Acuity Screening    Right eye Left eye Both eyes   Without correction:      With correction: 10/32 10/16 10/12         Clinical Decision Making:    Differential diagnoses for the patient include allergic, bacterial, or viral conjunctivitis, corneal abrasion, episcleritis,Takayasu arteritis, retinal hemorrhage, and glaucoma.  The  patient does not have exudate or discharge from the right eye (although the eye appears watery).  The patient has experienced a previous episode of this scleral irritation, redness and pain in the right eye about one year ago.  The patient is clear to note he does not think he has foreign body irritation of the eye.  Snellen exam indicates decreased visual acuity of the right eye compared to the left eye.  Because the patient is likely in need of tonometry and perhaps CT or MRI imaging for further evaluation of his condition, he is referred to [facility] Emergency Department (per patient preference).     At the end of the encounter, I discussed results, diagnosis, medications. Discussed red flags for immediate return to clinic/ER, as well as indications for follow up if no improvement. Patient understood and agreed to plan. Patient was stable for discharge.    1. Visual disturbance           Patient Instructions   Complete evaluation at [facility] ED.

## 2021-06-13 NOTE — TELEPHONE ENCOUNTER
Spoke with patient and he was just since with dr dao oct 2020 for medication check and is wondering if there is another way of signing the control substance agreement instead of coming in for another visit.

## 2021-06-13 NOTE — TELEPHONE ENCOUNTER
Spoke with patient and he is not sure when he would be able to come in for lab appt. He is really busy at work and his boss has been out. He will call us back when he is able to make appt.

## 2021-06-13 NOTE — TELEPHONE ENCOUNTER
Controlled Substance Refill Request  Medication Name:   Requested Prescriptions     Pending Prescriptions Disp Refills     dextroamphetamine-amphetamine (ADDERALL) 15 mg Tab 60 tablet 0     Sig: Take 15 mg by mouth 2 (two) times a day.     Date Last Fill: 11/5/20  [facility]  Submit electronically to pharmacy  Controlled Substance Agreement on file:   Encounter-Level CSA Scan Date:    There are no encounter-level csa scan date.        Last office visit:  10/16/20

## 2021-06-13 NOTE — TELEPHONE ENCOUNTER
Triage Call:    -Patients wife calling, CTC on file.   -Patient is having R. Eye pain since Saturday, pain is moderate and constant.   -R. Sclera of eye is red per wife  -Patient needs glasses, but wife stated patient has had new blurred vision since Saturday that has not improved.   -Patient states there is NO redness in the upper or lower eye lid and denies any swelling around the right or left eye.   -Patient has had a moderate headache that is constant since Saturday as well.   -Wife denies any fevers/chills.    Wife denies any shortness of breath or chest pain.     Per protocol recommendations are for patient to be seen in UCC or ED now. RN recommends UCC/WIC at this time. Wife agrees with disposition and will take patient to C/WIC now. RN advised wife of patient to call back with any new or worsening sx once patient is seen in person. Wife verbalized understanding and agrees with plan.     Constance Payne RN, BSN Nurse Triage Advisor 1:24 PM 12/8/2020     Additional Information    Negative: Eye pain from foreign body in eye    Negative: Eye pain from chemical in the eye    Negative: Followed an eye injury    Negative: Has sinus pain or pressure    Negative: Severe eye pain    Negative: Complete loss of vision in one or both eyes    Negative: Eyelids are very swollen (shut or almost) and fever    Negative: Eyelid (outer) is very red and fever    Negative: Foreign body sensation ('feels like something is in there') and irrigation didn't help    Negative: Vomiting    Negative: Recent eye surgery and increasing eye pain    Negative: Ulcer or sore seen on the cornea (clear center part of the eye)    Blurred vision and new or worsening    Protocols used: EYE PAIN-A-[place]    COVID 19 Nurse Triage Plan/Patient Instructions    Please be aware that novel coronavirus (COVID-19) may be circulating in the community. If you develop symptoms such as fever, cough, or SOB or if you have concerns about the presence of another  infection including coronavirus (COVID-19), please contact your health care provider or visit [email or web address].     Disposition/Instructions    In-Person Visit with provider recommended. Reference Visit Selection Guide.    Thank you for taking steps to prevent the spread of this virus.  o Limit your contact with others.  o Wear a simple mask to cover your cough.  o Wash your hands well and often.    Resources    [facility]: About COVID-19: [email or web address]    CDC: What to Do If You're Sick: www.cdc.gov/coronavirus/2019-ncov/about/steps-when-sick.html    CDC: Ending Home Isolation: www.cdc.gov/coronavirus/2019-ncov/hcp/disposition-in-home-patients.html     CDC: Caring for Someone: www.cdc.gov/coronavirus/2019-ncov/if-you-are-sick/care-for-someone.html     [facility]: Interim Guidance for Hospital Discharge to Home: www.[facility].mn./diseases/coronavirus/hcp/hospdischarge.pdf    [facility] clinical trials (COVID-19 research studies): clinicalaffairs.[facility]-clinical-trials     Below are the COVID-19 hotlines at the [place] Department of Health ([facility]). Interpreters are available.   o For health questions: Call 779-461-6414 or 1-629.971.3351 (7 a.m. to 7 p.m.)  o For questions about schools and childcare: Call 264-218-1922 or 1-730.653.1670 (7 a.m. to 7 p.m.)

## 2021-06-13 NOTE — TELEPHONE ENCOUNTER
FYI: I called and notified patient. He is going to call back to arrange a lab only appointment as he was in the middle of unloading a truck at work.

## 2021-06-13 NOTE — TELEPHONE ENCOUNTER
We can have him stop in the clinic to sign a controlled substance agreement and leave a urine drug screen.  Thanks.

## 2021-06-14 NOTE — TELEPHONE ENCOUNTER
Controlled Substance Refill Request  Medication Name:   Requested Prescriptions     Pending Prescriptions Disp Refills     dextroamphetamine-amphetamine (ADDERALL) 15 mg Tab 60 tablet 0     Sig: Take 15 mg by mouth 2 (two) times a day.     Date Last Fill: 12/9/20  [facility]  Submit electronically to pharmacy  Controlled Substance Agreement on file:   Encounter-Level CSA Scan Date:    There are no encounter-level csa scan date.        Last office visit:  10/16/20

## 2021-06-14 NOTE — TELEPHONE ENCOUNTER
Refill fax from [facility] for Losartan/Hctz 100/12.5 mg tablets    Last refill: 09/10    Please refill if appropriate.

## 2021-06-15 NOTE — TELEPHONE ENCOUNTER
Controlled Substance Refill Request  Medication Name:   Requested Prescriptions     Pending Prescriptions Disp Refills     dextroamphetamine-amphetamine (ADDERALL) 15 mg Tab 60 tablet 0     Sig: Take 15 mg by mouth 2 (two) times a day.     Date Last Fill: 1/13/21  [facility]  Submit electronically to pharmacy  Controlled Substance Agreement on file:   Encounter-Level CSA Scan Date:    There are no encounter-level csa scan date.        Last office visit:  10/16/20

## 2021-06-15 NOTE — TELEPHONE ENCOUNTER
Controlled Substance Refill Request  Medication Name:   Requested Prescriptions     Pending Prescriptions Disp Refills     dextroamphetamine-amphetamine (ADDERALL) 15 mg Tab 60 tablet 0     Sig: Take 15 mg by mouth 2 (two) times a day.     Date Last Fill: 2/8/21  [facility]  Submit electronically to pharmacy  Controlled Substance Agreement on file:   Encounter-Level CSA Scan Date:    There are no encounter-level csa scan date.        Last office visit:  10/16/20

## 2021-06-16 NOTE — TELEPHONE ENCOUNTER
Controlled Substance Refill Request  Medication Name:   Requested Prescriptions     Pending Prescriptions Disp Refills     dextroamphetamine-amphetamine (ADDERALL) 15 mg Tab 60 tablet 0     Sig: Take 15 mg by mouth 2 (two) times a day.     Date Last Fill: 3/11/21  [facility]ed [facility]  Submit electronically to pharmacy  Controlled Substance Agreement on file:   Encounter-Level CSA Scan Date:    There are no encounter-level csa scan date.        Last office visit:  10/16/20

## 2021-06-16 NOTE — TELEPHONE ENCOUNTER
Telephone Encounter by Laurel Crow at 12/31/2019  8:36 AM     Author: Laurel Crow Service: -- Author Type: --    Filed: 12/31/2019  8:36 AM Encounter Date: 12/30/2019 Status: Signed    : Laurel Crow APPROVED:    Approval start date: 12/31/2019  Approval end date:  12/27/2022    Pharmacy has been notified of approval and will contact patient when medication is ready for pickup.

## 2021-06-16 NOTE — TELEPHONE ENCOUNTER
Telephone Encounter by Elena Michele at 2/6/2019  3:06 PM     Author: Elena Michele Service: -- Author Type: --    Filed: 2/6/2019  3:06 PM Encounter Date: 2/5/2019 Status: Signed    : Elena Michele APPROVED:    Approval start date: 2/5/2019  Approval end date: 2/5/2020    Pharmacy has been notified of approval and will contact patient when medication is ready for pickup.

## 2021-06-16 NOTE — PROGRESS NOTES
Assessment and Plan:     1. Anxiety  Will wean patient off of Fluoxetine.  Will decrease 20 mg daily for 2 weeks.  Then, he will stop Fluoxetine and we will start Venlafaxine 37.5 mg one capsule daily.  Educated on its indications and side effects. He will follow-up in one moth.    - venlafaxine (EFFEXOR XR) 37.5 MG 24 hr capsule; Take 1 capsule (37.5 mg total) by mouth daily.  Dispense: 30 capsule; Refill: 0    2. Acute non-recurrent maxillary sinusitis  Will treat with Augmentin.  Educated on its indications and side effects. Discussed symptomatic treatment including OTC nasal saline sprays.  If no improvement with symptoms, the patient is to follow-up.    - amoxicillin-clavulanate (AUGMENTIN) 875-125 mg per tablet; Take 1 tablet by mouth 2 (two) times a day for 10 days.  Dispense: 20 tablet; Refill: 0    3. Acute Gout  Will check uric acid.  Will continue Allopurinol 300 mg daily.  He will use Prednisone as needed for gout flare.   - Uric Acid  - allopurinol (ZYLOPRIM) 300 MG tablet; Take 1 tablet (300 mg total) by mouth 2 (two) times a day.  Dispense: 60 tablet; Refill: 5  - predniSONE (DELTASONE) 20 MG tablet; Take 1 tablet (20 mg total) by mouth 2 (two) times a day for 5 days.  Dispense: 10 tablet; Refill: 0    4. Essential hypertension  We will discontinue losartan and start losartan-hydrochlorothiazide 50-12 0.5 mg daily.  Educated on its indications and side effects.  He will follow-up in 2-3 weeks for blood pressure recheck.  - losartan-hydrochlorothiazide (HYZAAR) 50-12.5 mg per tablet; Take 1 tablet by mouth daily.  Dispense: 30 tablet; Refill: 0    5. Medication management  - Basic Metabolic Panel    6. Health care maintenance  - Influenza, Seasonal,Quad Inj, 36+ MOS    The patient is content with the plan and will follow-up in 2-3 weeks for medication management or sooner with any further concerns. I spent 40 minutes with the patient with greater than 50% spent discussing symptoms, treatment options,  and coordination of care.         Subjective:     Rex is a 47 y.o. male presenting to the clinic for multiple concerns today.  Patient has been experiencing cold symptoms for 3 weeks.  He complains of sinus congestion or purulent drainage, facial pain and pressure, postnasal drainage, teeth pain, productive cough.  He denies stomachache, nausea, vomiting, shortness of breath, chest tightness, wheezing, fever.  He has tried multiple over-the-counter products with no relief.  No one else around him is ill.  Patient is concerned of ongoing anxiety and irritability.  He has tried Sertraline and Wellbutrin the past.  He is currently taking Fluoxetine 40 mg daily.  He has had difficulty focusing and feels more anxious during the winter.  He denies thoughts of suicide.  He is interested in trying another medication.  Patient states he recently had a gout flare after traveling to [place].  He did eat more red meat than typical.  He has been taking allopurinol 300 mg daily.  He did not take anything for his recent gout flare but would like to have prednisone on hand in case.  Lastly, patient's blood pressure is elevated today.  He is taking losartan 50 mg daily.  He denies chest pain, shortness of breath with exertion, edema, orthopnea, and syncope.    Review of Systems: A complete 14 point review of systems was obtained and is negative or as stated in the history of present illness.    Social History     Social History     Marital status:      Spouse name: Lizzette     Number of children: 2     Years of education: N/A     Occupational History     [employer]     Social History Main Topics     Smoking status: Never Smoker     Smokeless tobacco: Current User     Types: Chew     Alcohol use 0.5 oz/week     1 Standard drinks or equivalent per week      Comment: occasionally mostly on the weekend, rarely drinks     Drug use: No     Sexual activity: Yes     Partners: Female     Birth control/  protection: Surgical      Comment: vasectomy     Other Topics Concern     Not on file     Social History Narrative    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               Active Ambulatory Problems     Diagnosis Date Noted     Serum Enzyme Levels - ALT (SGPT) Elevated      Crohn's Disease      Cramp Of Limb      Anxiety      Esophageal Reflux      Acute Gout      Gout      Hyperlipidemia      Hypertension      Herniated Intervertebral Disc      Diverticulosis      Soft Tissue Neoplasm Of The Abdomen      Testicular Neoplasm      Bright Red Blood Per Rectum      Arthropathy Of The Ankle / Foot      Maisha-rectal abscess 04/14/2015     Diverticulosis of intestine without bleeding 10/06/2015     Internal hemorrhoids 10/06/2015     Chewing tobacco use 03/23/2016     Obesity 03/23/2016     Hyperlipidemia 03/23/2016     Anxiety 03/23/2016     Furuncle of forehead 03/01/2017     Resolved Ambulatory Problems     Diagnosis Date Noted     Hypokalemia      Skin Symptoms      Headache      Foot Pain (Soft Tissue)      Acrochordon      Motion sickness      Lower Back Pain      Past Medical History:   Diagnosis Date     Anxiety      Crohn disease      Diverticulosis      Esophageal reflux      Gout      Herniated disc      HTN (hypertension)      Hyperlipidemia      Hypokalemia      Motion sickness      Pancreatic cancer        Family History   Problem Relation Age of Onset     Hypertension Mother      Thyroid disease Mother      Diabetes Father      Hypertension Father      Heart disease Paternal Grandfather      Stroke Paternal Grandmother      Stroke Maternal Grandmother      Throat cancer Maternal Grandmother      Laryngeal cancer       Objective:     BP (!) 152/100  Pulse (!) 108  Temp 98.1  F (36.7  C)  Ht 6' (1.829 m)  Wt (!) 257 lb 12.8 oz (116.9 kg)  SpO2 97%  BMI 34.96 kg/m2    Patient is alert, in no obvious distress.   Skin: Warm, dry.  No lesions or rashes.  Skin turgor rapid return.   HEENT:  Head normocephalic,  atraumatic.  Eyes normal. Ears normal.  Nose patent, mucosa red.  Oropharynx mucosa pink.  No lesions or tonsillar enlargement.   Sinuses:  Tenderness to palpation of bilateral maxillary sinuses.  Neck: Supple, no lymphadenopathy.   Lungs:  Clear to auscultation. Respirations even and unlabored.  No wheezing or rales noted.   Heart:  Regular rate and rhythm.  No murmurs.   Abdomen: Soft, nontender.  No organomegaly. Bowel sounds normoactive. No guarding or masses noted.   Musculoskeletal:  No edema present in bilateral extremities.

## 2021-06-16 NOTE — TELEPHONE ENCOUNTER
Refill Approved    Rx renewed per Medication Renewal Policy. Medication was last renewed on 3/7/20.    Nima Melo, [facility] Triage/Med Refill 3/30/2021     Requested Prescriptions   Pending Prescriptions Disp Refills     DULoxetine (CYMBALTA) 60 MG capsule 90 capsule 3     Sig: TAKE 1 CAPSULE(60 MG) BY MOUTH DAILY       Tricyclics/Misc Antidepressant/Antianxiety Meds Refill Protocol Passed - 3/29/2021 12:07 PM        Passed - PCP or prescribing provider visit in last year     Last office visit with prescriber/PCP: 9/5/2019 Anisa Bush CNP OR same dept: Visit date not found OR same specialty: 9/22/2020 Issac Lange MD  Last physical: 10/30/2018 Last MTM visit: Visit date not found   Next visit within 3 mo: Visit date not found  Next physical within 3 mo: Visit date not found  Prescriber OR PCP: Anisa Bush CNP  Last diagnosis associated with med order: 1. Irritability  - DULoxetine (CYMBALTA) 60 MG capsule; TAKE 1 CAPSULE(60 MG) BY MOUTH DAILY  Dispense: 90 capsule; Refill: 3    2. Chronic bilateral low back pain with bilateral sciatica  - DULoxetine (CYMBALTA) 60 MG capsule; TAKE 1 CAPSULE(60 MG) BY MOUTH DAILY  Dispense: 90 capsule; Refill: 3    If protocol passes may refill for 12 months if within 3 months of last provider visit (or a total of 15 months).

## 2021-06-17 NOTE — TELEPHONE ENCOUNTER
----- Message from Anisa Bush CNP sent at 5/9/2021  5:39 PM CDT -----  Please notify the patient that his labs are normal except his urine is showing marijuana.  As we discussed, I encourage him to avoid this. If he would like to continue using marijuana, I can place a referral to psychiatry for treatment of his ADD.  I am willing to prescribe until he has an appointment with them.  Otherwise, I recommend lab only recheck in 1-2 months.  As for his finger, I placed a referral to orthopedics for further evaluation.  Please provide information for [facility].  Thanks.

## 2021-06-17 NOTE — PROGRESS NOTES
Assessment and Plan:     1. Pain of finger of right hand  Differentials include tendinitis, fracture, gout, rheumatoid arthritis.  X-ray shows no acute fracture or osteoarthritic arthritis.  Will have radiology review.  Will obtain uric acid to rule out gout.  Patient states indomethacin has worked the best for him for gout flares.  We did discuss that this could potentially cause a Crohn flare, and patient understands this.  We will also obtain our RA and NAILA to rule out rheumatoid arthritis.  - XR Hand Right 3 or More VWS; Future  - Uric Acid  - Rheumatoid Factor Quant  - Antinuclear Antibody (NAILA) Cascade  - indomethacin (INDOCIN) 50 MG capsule; Take 1 capsule (50 mg total) by mouth 3 (three) times a day with meals.  Dispense: 30 capsule; Refill: 1    2. Attention deficit disorder (ADD) without hyperactivity  This is controlled with Adderall 15 mg twice daily.  Will obtain urine drug screen.  He does admit to intermittent marijuana use.  I discussed that as of current this is an illicit drug.  I discouraged the use of this.  I obtained a new controlled substance agreement today.  I reviewed the prescription drug monitoring program and it appears as though he has been prescribed Lyrica, gabapentin, Tylenol with codeine, and Vicodin in the past for his chronic pain.    3. Chronic pain syndrome  He continues duloxetine.  He uses marijuana for pain control.    4. Crohn's disease without complication, unspecified gastrointestinal tract location (H)  This is controlled.  He is aware that indomethacin may exacerbate Crohn's symptoms.    5. Hypertension  This is controlled with losartan-hydrochlorothiazide.  Will check BMP.    6. Medication management  - Basic Metabolic Panel  - Drug Abuse 1+, Urine    I encouraged him to schedule a fasting physical.  He is also due to see [place] due to history of abdominal neoplasm.    Subjective:     Rex is a 51 y.o. male presenting to the clinic for concerns for right hand pain.   Patient has a history of bilateral carpal tunnel surgery.  He continues to experience right wrist pain.  Over the past 8 days, he has had pain within the MCP and PIP joints of the right hand pointer finger.  He complains of difficulty with range of motion within the PIP joint.  He has had some swelling.  He has not noticed redness.  He denies any known injury.  He has not been taking any pain medications.  He does have a history of gout.  He takes allopurinol for gout prophylaxis.  Patient has hypertension which is controlled with losartan-hydrochlorothiazide.  He has a history of Crohn's and denies any recent flares.  He is taking Adderall 15 mg twice daily for ADD.  He feels as though the medication allows him to focus and complete tasks in a timely manner.  He does admit to marijuana use due to chronic neck and back pain.  He has been prescribed pramipexole and gabapentin to assist with polyneuropathy.  He discontinued the medication on his own.  He finds that the marijuana works the best.  He is taking duloxetine 60 mg for chronic pain.    Review of Systems: A complete 14 point review of systems was obtained and is negative or as stated in the history of present illness.    Social History     Socioeconomic History     Marital status:      Spouse name: Lizzette     Number of children: 2     Years of education: Not on file     Highest education level: Not on file   Occupational History     Occupation: construction     Employer: [employer]   Social Needs     Financial resource strain: Not on file     Food insecurity     Worry: Not on file     Inability: Not on file     Transportation needs     Medical: Not on file     Non-medical: Not on file   Tobacco Use     Smoking status: Never Smoker     Smokeless tobacco: Current User     Types: Chew   Substance and Sexual Activity     Alcohol use: Yes     Alcohol/week: 0.8 standard drinks     Types: 1 Standard drinks or equivalent per week     Comment:  occasionally mostly on the weekend, rarely drinks     Drug use: No     Sexual activity: Yes     Partners: Female     Birth control/protection: Surgical     Comment: vasectomy   Lifestyle     Physical activity     Days per week: Not on file     Minutes per session: Not on file     Stress: Not on file   Relationships     Social connections     Talks on phone: Not on file     Gets together: Not on file     Attends Church service: Not on file     Active member of club or organization: Not on file     Attends meetings of clubs or organizations: Not on file     Relationship status: Not on file     Intimate partner violence     Fear of current or ex partner: Not on file     Emotionally abused: Not on file     Physically abused: Not on file     Forced sexual activity: Not on file   Other Topics Concern     Not on file   Social History Narrative    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           Active Ambulatory Problems     Diagnosis Date Noted     Serum Enzyme Levels - ALT (SGPT) Elevated      Crohn's Disease      Esophageal Reflux      Gout      Hypertension      Herniated Intervertebral Disc      Diverticulosis      Soft Tissue Neoplasm Of The Abdomen      Testicular Neoplasm      Maisha-rectal abscess 04/14/2015     Diverticulosis of intestine without bleeding 10/06/2015     Internal hemorrhoids 10/06/2015     Chewing tobacco use 03/23/2016     Obesity 03/23/2016     Hyperlipidemia 03/23/2016     Anxiety 03/23/2016     Obesity (BMI 35.0-39.9) with comorbidity (H) 10/30/2018     Attention deficit disorder (ADD) without hyperactivity 09/06/2019     Chronic pain syndrome 10/22/2019     Resolved Ambulatory Problems     Diagnosis Date Noted     Hypokalemia      Cramp of limb      Skin Symptoms      Headache      Anxiety      Acute Gout      Foot Pain (Soft Tissue)      Acrochordon      Motion sickness      Lower Back Pain      Hyperlipidemia      Bright Red Blood Per Rectum      Arthropathy Of The Ankle / Foot       Furuncle of forehead 03/01/2017     Past Medical History:   Diagnosis Date     Arthritis      Crohn disease (H)      Diverticulosis      Gout      Herniated disc      HTN (hypertension)      Hypokalemia      Pancreatic cancer (H)        Family History   Problem Relation Age of Onset     Hypertension Mother      Thyroid disease Mother      Varicose Veins Mother      Diabetes Father      Hypertension Father      Heart attack Father      Heart disease Paternal Grandfather      Stroke Paternal Grandmother      Stroke Maternal Grandmother      Throat cancer Maternal Grandmother         Laryngeal cancer       Objective:     /80 (Patient Site: Right Arm, Patient Position: Sitting, Cuff Size: Adult Large)   Pulse 80   Wt (!) 253 lb (114.8 kg)   BMI 34.31 kg/m      Patient is alert, in no obvious distress.   Skin: Warm, dry.    Lungs:  Clear to auscultation. Respirations even and unlabored.  No wheezing or rales noted.   Heart:  Regular rate and rhythm.  No murmurs.   Abdomen: Soft, nontender.  No organomegaly. Bowel sounds normoactive.  Musculoskeletal: He is tender to palpation of the MCP and PIP joints of the right hand pointer finger.  There is some slight swelling within the PIP joint.    LABORATORY: I ordered and personally reviewed right hand x-rays showing no obvious fracture.  Will have radiology review.

## 2021-06-17 NOTE — PROGRESS NOTES
Complains of burning legs all the time for years. History of back pain. Has tried compression socks with no help.Plan to see rheumatology for restless leg syndrome. Did offer ultrasound but patient declined it.

## 2021-06-17 NOTE — PROGRESS NOTES
Assessment and Plan:     1. Lumbar radiculopathy  gabapentin (NEURONTIN) 300 MG capsule    Ambulatory referral to [facility]   2. Pain in both lower extremities  Basic Metabolic Panel     I suspect that he is expecting lumbar radiculopathy.  Will increase gabapentin to 300 mg twice daily.  Will refer to spine clinic for further evaluation.  Patient does not want a repeat MRI.  Will check BMP to rule out electrolyte imbalance.  He recently had a normal magnesium.  Will also refer to vascular center for further evaluation.  He may have underlying peripheral vascular disease or insufficiency. He is content with the plan.    Subjective:     Rex is a 48 y.o. male presenting to the clinic for concerns for lower extremity pain.  Patient states over the past 2 years, he has had cramps within his lower extremities.  It has worsened over the past 6 months.  Patient is now experiencing cramping from his feet to his upper thighs.  Occasionally he has pain that radiates into his left groin.  He saw a vascular specialist who recommended compression socks.  He has found no relief with this.  He feels as though he has muscle spasms and an intermittent burning and throbbing sensation.  He feels as though his left leg is going to give out.  He denies any back pain.  Walking occasionally helps the pain.  Sitting exacerbates the pain.  He denies any recent travel.  He does take gabapentin 300 mg in the afternoon.  He has a history of a lumbar MRI on 9/9/14 showing the following:    IMPRESSION:   1. Interval decompressive laminectomies L3-L4 and L4-L5 with dorsal decompression of the canal at these levels. There remains lateral recess narrowing at L3-L4 with abutment of the traversing L4 nerve roots, left more so than the right.  2. There has been mild progression of the degree of foraminal stenosis at L3-L4 with at least moderate or moderate to severe foraminal stenosis now seen on the right and more mild to borderline foraminal  stenosis seen on the left. Progression   predominantly relates to increased mass effect by hypertrophic facet degenerative changes.  3. Combination of hypertrophic facet changes and disc osteophyte complex at L4-L5 results in borderline left and at least mild right foraminal narrowing, similar to prior.  4. Epidural lipomatosis effaces the dorsal thecal sac at L2-L3 and mildly narrows the thecal sac.    Review of Systems: A complete 14 point review of systems was obtained and is negative or as stated in the history of present illness.    Social History     Social History     Marital status:      Spouse name: Lizzette     Number of children: 2     Years of education: N/A     Occupational History     [employer]     Social History Main Topics     Smoking status: Never Smoker     Smokeless tobacco: Current User     Types: Chew     Alcohol use 0.5 oz/week     1 Standard drinks or equivalent per week      Comment: occasionally mostly on the weekend, rarely drinks     Drug use: No     Sexual activity: Yes     Partners: Female     Birth control/ protection: Surgical      Comment: vasectomy     Other Topics Concern     Not on file     Social History Narrative    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               Active Ambulatory Problems     Diagnosis Date Noted     Serum Enzyme Levels - ALT (SGPT) Elevated      Crohn's Disease      Cramp Of Limb      Esophageal Reflux      Acute Gout      Gout      Hypertension      Herniated Intervertebral Disc      Diverticulosis      Soft Tissue Neoplasm Of The Abdomen      Testicular Neoplasm      Bright Red Blood Per Rectum      Arthropathy Of The Ankle / Foot      Maisha-rectal abscess 04/14/2015     Diverticulosis of intestine without bleeding 10/06/2015     Internal hemorrhoids 10/06/2015     Chewing tobacco use 03/23/2016     Obesity 03/23/2016     Hyperlipidemia 03/23/2016     Anxiety 03/23/2016     Furuncle of forehead 03/01/2017     Resolved  Ambulatory Problems     Diagnosis Date Noted     Hypokalemia      Skin Symptoms      Headache      Anxiety      Foot Pain (Soft Tissue)      Acrochordon      Motion sickness      Lower Back Pain      Hyperlipidemia      Past Medical History:   Diagnosis Date     Anxiety      Crohn disease      Diverticulosis      Esophageal reflux      Gout      Herniated disc      HTN (hypertension)      Hyperlipidemia      Hypokalemia      Motion sickness      Pancreatic cancer        Family History   Problem Relation Age of Onset     Hypertension Mother      Thyroid disease Mother      Diabetes Father      Hypertension Father      Heart disease Paternal Grandfather      Stroke Paternal Grandmother      Stroke Maternal Grandmother      Throat cancer Maternal Grandmother      Laryngeal cancer       Objective:     /80  Pulse 90  Ht 6' (1.829 m)  Wt (!) 263 lb 8 oz (119.5 kg)  BMI 35.74 kg/m2    Patient is alert, in no obvious distress.   Skin: Warm, dry.    Lungs:  Clear to auscultation. Respirations even and unlabored.  No wheezing or rales noted.   Heart:  Regular rate and rhythm.  No murmurs.   Musculoskeletal:  Full ROM of extremities.  He has difficulty climbing on the exam table with his left leg.  He is nontender to palpation of the musculature of his back.  Patellar and Achilles reflexes +2.  Straight leg raise is negative bilaterally.

## 2021-06-17 NOTE — TELEPHONE ENCOUNTER
Left message to call back for: pt  Information to relay to patient:  Left message to call and schedule medication check with Anisa Bush re:  fadi

## 2021-06-17 NOTE — PROGRESS NOTES
Assessment and Plan:     1. Hypertension  Blood pressure remains uncontrolled.  Will increase losartan-hydrochlorothiazide to 100-12.5 mg daily.  Educated on its indications and side effects.   He will follow-up in 2-3 weeks for blood pressure recheck.  - losartan-hydrochlorothiazide (HYZAAR) 100-12.5 mg per tablet; Take 1 tablet by mouth daily.  Dispense: 30 tablet; Refill: 0    2. Mixed hyperlipidemia  Unfortunately, patient has not presented to the clinic fasting for cholesterol recheck.  He states he recently had his cholesterol checked at work and it was normal.    3. Pain in both lower extremities  Differentials include autoimmune disease, low magnesium, electrolyte imbalance, restless leg syndrome, venous insufficiency, varicose veins, lumbar radiculopathy.  Will rule out electrolyte imbalance and autoimmune disease.  Will refer to vascular center for further evaluation.  He is content with the plan.  - Basic Metabolic Panel  - Magnesium  - Sedimentation Rate  - Antinuclear Antibody (NAILA) Cascade  - Rheumatoid Factor Quant  - Ambulatory referral to Vascular Center        Subjective:     Rex is a 48 y.o. male presenting to the clinic for blood pressure recheck.  Patient was seen in clinic on 2/23/18 where his blood pressure was noted to be elevated.  We discontinued Losartan and started losartan-hydrochlorothiazide 50-12.5 mg daily.  He is tolerating the medication well.  He denies any side effects.  He denies chest pain, shortness of breath with exertion, edema, orthopnea, syncope.  He does experience bilateral lower extremity pain in the evenings.  It has worsened over the past year.  He is now having difficulty sleeping due to the discomfort.  He complains of a burning sensation.  He denies any recent travel.  He does not smoke but he does chew tobacco.  He occasionally has stiffness within his wrist and hands.  He is involved in a [employer] company.  He has not noticed any redness or  swelling of his joints.  Patient does have anxiety.  We recently started venlafaxine 37.5 mg daily.  He is tolerating the medication well.    Review of Systems: A complete 14 point review of systems was obtained and is negative or as stated in the history of present illness.    Social History     Social History     Marital status:      Spouse name: Lizzette     Number of children: 2     Years of education: N/A     Occupational History     [employer]     Social History Main Topics     Smoking status: Never Smoker     Smokeless tobacco: Current User     Types: Chew     Alcohol use 0.5 oz/week     1 Standard drinks or equivalent per week      Comment: occasionally mostly on the weekend, rarely drinks     Drug use: No     Sexual activity: Yes     Partners: Female     Birth control/ protection: Surgical      Comment: vasectomy     Other Topics Concern     Not on file     Social History Narrative    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               Active Ambulatory Problems     Diagnosis Date Noted     Serum Enzyme Levels - ALT (SGPT) Elevated      Crohn's Disease      Cramp Of Limb      Esophageal Reflux      Acute Gout      Gout      Hypertension      Herniated Intervertebral Disc      Diverticulosis      Soft Tissue Neoplasm Of The Abdomen      Testicular Neoplasm      Bright Red Blood Per Rectum      Arthropathy Of The Ankle / Foot      Maisha-rectal abscess 04/14/2015     Diverticulosis of intestine without bleeding 10/06/2015     Internal hemorrhoids 10/06/2015     Chewing tobacco use 03/23/2016     Obesity 03/23/2016     Hyperlipidemia 03/23/2016     Anxiety 03/23/2016     Furuncle of forehead 03/01/2017     Resolved Ambulatory Problems     Diagnosis Date Noted     Hypokalemia      Skin Symptoms      Headache      Anxiety      Foot Pain (Soft Tissue)      Acrochordon      Motion sickness      Lower Back Pain      Hyperlipidemia      Past Medical History:   Diagnosis Date     Anxiety       Crohn disease      Diverticulosis      Esophageal reflux      Gout      Herniated disc      HTN (hypertension)      Hyperlipidemia      Hypokalemia      Motion sickness      Pancreatic cancer        Family History   Problem Relation Age of Onset     Hypertension Mother      Thyroid disease Mother      Diabetes Father      Hypertension Father      Heart disease Paternal Grandfather      Stroke Paternal Grandmother      Stroke Maternal Grandmother      Throat cancer Maternal Grandmother      Laryngeal cancer       Objective:     /88  Pulse 90  Ht 6' (1.829 m)  Wt (!) 262 lb 8 oz (119.1 kg)  BMI 35.6 kg/m2    Patient is alert, in no obvious distress.   Skin: Warm, dry.    Lungs:  Clear to auscultation. Respirations even and unlabored.  No wheezing or rales noted.   Heart:  Regular rate and rhythm.  No murmurs, S3, S4, gallops, or rubs.    Abdomen: Soft, nontender.  No organomegaly. Bowel sounds normoactive. No guarding or masses noted.   Musculoskeletal: He has full range of motion of his lower extremities.  There are some bulging varicose veins present.  Pedal pulses present palpable.  Sensations intact.

## 2021-06-17 NOTE — PROGRESS NOTES
Assessment / Plan:     Diagnoses and all orders for this visit:    Lumbalgia  -     MR Cervical Spine Without Contrast; Future; Expected date: 5/11/18  -     MR Lumbar Spine Without Contrast; Future; Expected date: 5/11/18  -     predniSONE (DELTASONE) 50 MG tablet; Take 1 tablet (50 mg total) by mouth daily for 5 days.  Dispense: 5 tablet; Refill: 0  -     HYDROcodone-acetaminophen 5-325 mg per tablet; Take 1 tablet by mouth at bedtime as needed for pain.  Dispense: 20 tablet; Refill: 0    Lumbar radiculitis  -     MR Cervical Spine Without Contrast; Future; Expected date: 5/11/18  -     MR Lumbar Spine Without Contrast; Future; Expected date: 5/11/18  -     predniSONE (DELTASONE) 50 MG tablet; Take 1 tablet (50 mg total) by mouth daily for 5 days.  Dispense: 5 tablet; Refill: 0  -     HYDROcodone-acetaminophen 5-325 mg per tablet; Take 1 tablet by mouth at bedtime as needed for pain.  Dispense: 20 tablet; Refill: 0    Myofascial pain  -     MR Cervical Spine Without Contrast; Future; Expected date: 5/11/18  -     MR Lumbar Spine Without Contrast; Future; Expected date: 5/11/18  -     predniSONE (DELTASONE) 50 MG tablet; Take 1 tablet (50 mg total) by mouth daily for 5 days.  Dispense: 5 tablet; Refill: 0  -     HYDROcodone-acetaminophen 5-325 mg per tablet; Take 1 tablet by mouth at bedtime as needed for pain.  Dispense: 20 tablet; Refill: 0    Cervicalgia  -     MR Cervical Spine Without Contrast; Future; Expected date: 5/11/18  -     MR Lumbar Spine Without Contrast; Future; Expected date: 5/11/18  -     predniSONE (DELTASONE) 50 MG tablet; Take 1 tablet (50 mg total) by mouth daily for 5 days.  Dispense: 5 tablet; Refill: 0  -     HYDROcodone-acetaminophen 5-325 mg per tablet; Take 1 tablet by mouth at bedtime as needed for pain.  Dispense: 20 tablet; Refill: 0    Cervical radiculitis  -     MR Cervical Spine Without Contrast; Future; Expected date: 5/11/18  -     MR Lumbar Spine Without Contrast; Future;  Expected date: 5/11/18  -     predniSONE (DELTASONE) 50 MG tablet; Take 1 tablet (50 mg total) by mouth daily for 5 days.  Dispense: 5 tablet; Refill: 0  -     HYDROcodone-acetaminophen 5-325 mg per tablet; Take 1 tablet by mouth at bedtime as needed for pain.  Dispense: 20 tablet; Refill: 0          Rex Bond Jr. is a 48 y.o. y.o. male with past medical history significant for Crohn disease, gout, hypertension, diverticulosis, obesity, hyperlipidemia, anxiety, who presents today for follow-up regarding bilateral low back pain with radicular pain to the left medial thigh, medial lower leg, left foot since December 2017 after slipping and falling on ice.  Patient symptoms could be possibly due to disc herniation at the lower lumbar spine.  Patient status post lumbar decompression back in May 2014 with Dr. Tena.  No red flags.    Cervicalgia with cervical radiculitis to the upper extremity bilaterally and the C8 nerve distribution after his fall.  Should symptoms could be due to disc herniation in the cervical spine.  No red flags.    A shared decision making plan was used.  The patient's values and choices were respected.  The following represents what was discussed and decided upon by the physician and the patient.      1.  DIAGNOSTIC TESTS: I ordered lumbar and cervical MRI to evaluate for possible disc herniation to be causing his symptoms.  2.  PHYSICAL THERAPY: Patient will continue on home exercises that he has learned from the physical therapy program.  3.  MEDICATIONS: Patient will start on prednisone 50 mg 1 tablet for 5 days.  I recommend patient to increase gabapentin 300 mg into 1 tablet 3 times a day gradually.  I prescribed hydrocodone-acetaminophen 5-325 mg to be taken 1 tablet only at nighttime for severe pain.   reviewed.  No refills.  Side effects of the medication discussed with the patient today.  4.  INTERVENTIONS: No therapeutic steroid injection at this time.  5.  PATIENT  EDUCATION: I thoroughly discussed the plan with the patient today.  He verbalizes understanding and agrees with the plan.  6.  FOLLOW-UP: Patient will follow up with me in 2  weeks.  All questions were answered.      ROBERT Barba, MPA-C      Subjective:     Rex Bond Jr. is a 48 y.o. male who presents today for follow-up regarding bilateral low back pain, radicular pain to the left medial leg, left medial lower leg, and the left foot.  Patient also reports bilateral neck pain and radicular pain and paresthesias to the medial arm, left medial forearm and the fourth and the fifth digits bilaterally.  Patient states that her symptoms started back in December 2017 when he fell and landed on ice.  Patient tried to take medication, doing exercises without any pain relief.  He noticed worsening of his symptoms in the last several weeks.  He tried to take gabapentin 300 mg 1 tablet at nighttime without significant pain relief.  His last cervical and lumbar MRI was done back in 2014.  He has no recent imaging.  At this time he reports 8 out of 10 intensity pain in the neck, and in the lower back with the radicular pain and paresthesias to the upper and to the lower extremity.  His symptoms are mildly alleviated by taking gabapentin 300 mg.Patient denies urinary or bowel incontinence, unintentional weight loss, saddle anesthesia, fever or chills, balance difficulties.      Review of Systems:  Bilateral neck pain radiating to the upper extremity bilaterally.  Bilateral low back pain with radicular pain to the left lower extremity.Patient denies urinary or bowel incontinence, unintentional weight loss, saddle anesthesia, fever or chills, balance difficulties.       Objective:   CONSTITUTIONAL:  Vital signs as above.  No acute distress.  The patient is well nourished and well groomed.    PSYCHIATRIC:  The patient is awake, alert, oriented to person, place and time.  The patient is answering questions appropriately  with clear speech.  Normal affect.  SKIN:  Skin over the face, posterior torso, bilateral upper and lower extremities is clean, dry, intact without rashes.  MUSCULOSKELETAL:  Gait is non-antalgic.  The patient is able to heel and toe walk without any difficulty.  Mild tenderness over the L4-L5, L5-S1.  Lumbar paraspinal muscles.      The patient has 5/5 strength for the bilateral hip flexors, knee flexors/extensors, ankle dorsiflexors/plantar flexors, ankle evertors/invertors.    NEUROLOGICAL:  2/4 patellar, medial hamstring, achilles reflexes which are symmetric bilaterally.  No ankle clonus bilaterally.  Sensation to light touch is intact in the bilateral L4, L5, and S1 dermatomes.  Negative straight leg raise bilaterally.  Negative hip impingement Shabbir test bilaterally.    MUSCULOSKELETAL: The patient has 5/5 strength for the bilateral shoulder abductors, elbow flexors/extensors, wrist extensors, finger flexors/abductors.   NEUROLOGICAL:  2/4  Biceps, brachioradialis, triceps reflexes bilaterally.  Negative Abraham's bilaterally.  Sensation to pinprick is intact.  Positive Spurling test on the left.  Decreased range of motion of the cervical spine due to pain.  Tenderness present at the upper trapezius bilaterally.       RESULTS: No new imaging to review today.

## 2021-06-17 NOTE — TELEPHONE ENCOUNTER
Controlled Substance Refill Request  Medication Name:   Requested Prescriptions     Pending Prescriptions Disp Refills     dextroamphetamine-amphetamine (ADDERALL) 15 mg Tab 60 tablet 0     Sig: Take 15 mg by mouth 2 (two) times a day.     Date Last Fill: 4/7/21  [facility]ed [facility]acy: [facility]  Submit electronically to pharmacy  Controlled Substance Agreement on file:   Encounter-Level CSA Scan Date:    There are no encounter-level csa scan date.        Last office visit:  10/16/20

## 2021-06-17 NOTE — PROGRESS NOTES
[facility] Surgery Follow up    HPI:    48 y.o. year old male who returns for a follow up.  He presents today for clinic for leg pain he states his pain does not come until night his legs are very restless can keep him still he has this pain is acute him up at night.  I saw here a few years ago for similar count of problems with some some compression we did an ultrasound workup for venous disease and it was negative for having major vein issues or problems.  He tried the socks for a short time.  Thought maybe they helped a little bit but the symptoms returned in the went back to his primary.  He has these continued ongoing problems sounds a lot like restless legs and comes in today to seek if venous issues may be a problem.  Otherwise his normal state of health very active angela with work he is on his feet all the time and walking continuously.    Allergies:Review of patient's allergies indicates no known allergies.    Past Medical History:   Diagnosis Date     Anxiety      Crohn disease      Diverticulosis      Esophageal reflux      Gout      Herniated disc      HTN (hypertension)      Hyperlipidemia      Hypokalemia      Motion sickness      Pancreatic cancer     mass removed at [place] 4/2012       Past Surgical History:   Procedure Laterality Date     APPENDECTOMY  2003    Description: Appendectomy;  Recorded: 02/10/2009;  Comments: 11-0-03     BACK SURGERY  2014    L3,4,5 surgically repaired     excision of abdominal wall tumor      found out was pancreatic cancer     KNEE SURGERY Right      VASECTOMY  2000       CURRENT MEDS:  Current Outpatient Prescriptions on File Prior to Visit   Medication Sig Dispense Refill     allopurinol (ZYLOPRIM) 300 MG tablet TAKE 1 TABLET(300 MG) BY MOUTH TWICE DAILY 180 tablet 3     gabapentin (NEURONTIN) 300 MG capsule TAKE 1 CAPSULE(300 MG) BY MOUTH TWICE DAILY 180 capsule 0     losartan-hydrochlorothiazide (HYZAAR) 100-12.5 mg per tablet Take 1 tablet by mouth daily. 90 tablet 4      omeprazole (PRILOSEC) 20 MG capsule Take 2 capsules (40 mg total) by mouth daily before breakfast. 90 capsule 0     venlafaxine (EFFEXOR-XR) 37.5 MG 24 hr capsule TAKE 1 CAPSULE(37.5 MG) BY MOUTH DAILY 90 capsule 0     No current facility-administered medications on file prior to visit.        Family History   Problem Relation Age of Onset     Hypertension Mother      Thyroid disease Mother      Diabetes Father      Hypertension Father      Heart disease Paternal Grandfather      Stroke Paternal Grandmother      Stroke Maternal Grandmother      Throat cancer Maternal Grandmother      Laryngeal cancer        reports that he has never smoked. His smokeless tobacco use includes Chew. He reports that he drinks about 0.5 oz of alcohol per week  He reports that he does not use illicit drugs.    Review of Systems:  Negative except leg pain bilaterally.  Pain most every night restless, legs and cannot get comfortable.  Quit smoking years ago. Otherwise twelve system of review is negative.      OBJECTIVE:  Vitals:    05/09/18 0956   BP: 130/90   Pulse: 76   Resp: 12   Temp: 98.7  F (37.1  C)   TempSrc: Oral   Weight: (!) 258 lb (117 kg)     Body mass index is 34.99 kg/(m^2).    EXAM:  GENERAL: This is a well-developed 48 y.o. male who appears his stated age  HEAD: normocephalic  HEENT: Pupils equal and reactive bilaterally  CARDIAC: RRR without murmur  CHEST/LUNG:  Clear to auscultation  ABDOMEN: Soft, nontender, nondistended, no masses    NEUROLOGIC: Focally intact, nonfocal  VASCULAR: Pulses intact, symmetrical upper and lower extremities.        LABS:  Lab Results   Component Value Date    WBC 10.2 09/25/2017    WBC 13.1 (H) 06/24/2015    HGB 14.5 09/25/2017    HCT 41.1 09/25/2017    MCV 86 09/25/2017     09/25/2017     INR/Prothrombin Time      No results found for: HGBA1C  Lab Results   Component Value Date    ALT 23 09/25/2017    AST 22 09/25/2017    ALKPHOS 64 09/25/2017    BILITOT 0.5 09/25/2017           Assessment/Plan:   1. Leg pain  He has got pain similarly presented to couple years ago for me in 2015 working with her with an ultrasound really found no major venous issues at all.  Put in some compression socks I do not think that we need to put him through another workup again today he has no outward signs of varicose veins no spider veins scattered great pulses in the very active angela.  I did off the ultrasound with.and told him that I did not think would be much results and he opts to not get it today  The symptomatology and issues is her sounds like restless legs and I will get him a neurology consultation hopefully can help him  - Ambulatory referral to Neurology    2. Restless legs syndrome  As above  - Ambulatory referral to Neurology      Return if symptoms worsen or fail to improve.     Lázaro Barney MD  [facility] Department of Surgery

## 2021-06-18 NOTE — LETTER
Letter by Anisa Bush CNP at      Author: Anisa Bush CNP Service: -- Author Type: --    Filed:  Encounter Date: 2/4/2019 Status: (Other)         [facility]/OB  02/04/19    Patient: Rex Bond Jr.  YOB: 1970  Medical Record Number: [id number]  CSN: [id number]                                                                              Non-opioid Controlled Substance Agreement    I understand that my care provider has prescribed a controlled substance to help manage my condition(s). I am taking this medicine to help me function or work. I know this is strong medicine, and that it can cause serious side effects. Controlled substances can be sedating, addicting and may cause a dependency on the drug. They can affect my ability to drive or think, and cause depression. They need to be taken exactly as prescribed. Combining controlled substances with certain medicines or chemicals (such as cocaine, sedatives and tranquilizers, sleeping pills, meth) can be dangerous or even fatal. Also, if I stop controlled substances suddenly, I may have severe withdrawal symptoms.  If not helpful, I may be asked to stop them.    The risks, benefits, and side effects of these medicine(s) were explained to me. I agree that:    1. I will take part in other treatments as advised by my care team. This may be psychiatry or counseling, physical therapy, behavioral therapy, group treatment or a referral to a pain clinic. I will reduce or stop my medicine when my care team tells me to do so.  2. I will take my medicines as prescribed. I will not change the dose or schedule unless my care team tells me to. There will be no refills if I run out early.  I may be contactedwithout warning and asked to complete a urine drug test or pill count at any time.   3. I will keep all my appointments, and understand this is part of the monitoring of controlled substances. My care team may require an office visit for EVERY  controlled substance refill. If I miss appointments or dont follow instructions, my care team may stop my medicine.  4. I will not ask other providers to prescribe controlled substances, and I will not accept controlled substances from other people. If I need another prescribed controlled substance for a new reason, I will tell my care team within 1 business day.  5. I will use one pharmacy to fill all of my controlled substance prescriptions, and it is up to me to make sure that I do not run out of my medicines on weekends or holidays. If my care team is willing to refill my controlled substance prescription without a visit, I must request refills only during office hours, refills may take up to 3 days to process, and it may take up to 5 to 7 days for my medicine to be mailed and ready at my pharmacy. Prescriptions will not be mailed anywhere except my pharmacy.    6. I am responsible for my prescriptions. If the medicine/prescription is lost or stolen, it will not be replaced. I also agree not to share controlled substance medicines with anyone.          [facility]/OB  02/04/19  Patient:  Rex Bond .  YOB: 1970  Medical Record Number: [id number]  CSN: [id number]    7. I agree to not use ANY illegal or recreational drugs. This includes marijuana, cocaine, bath salts or other drugs. I agree not to use alcohol unless my care team says I may. I agree to give urine samples whenever asked. If I dont give a urine sample, the care team may stop my medicine.    8. If I enroll in the [place] Medical Marijuana program, I will tell my care team. I will also sign an agreement to share my medical records with my care team.    9. I will bring in my list of medicines (or my medicine bottles) each time I come to the clinic.   10. I will tell my care team right away if I become pregnant or have a new medical problem treated outside of my regular clinic.  11. I understand that this medicine can  affect my thinking and judgment. It may be unsafe for me to drive, use machinery and do dangerous tasks. I will not do any of these things until I know how the medicine affects me. If my dose changes, I will wait to see how it affects me. I will contact my care team if I have concerns about medicine side effects.    I understand that if I do not follow any of the conditions above, my prescriptions or treatment may be stopped.      I agree that my provider, clinic care team, and pharmacy may work with any city, state or federal law enforcement agency that investigates the misuse, sale, or other diversion of my controlled medicine. I will allow my provider to discuss my care with or share a copy of this agreement with any other treating provider, pharmacy or emergency room where I receive care. I agree to give up (waive) any right of privacy or confidentiality with respect to these consents.   I have read this agreement and have asked questions about anything I did not understand.    ___________________________________________________________________________  Patient signature - Date/Time  -Rex Bond Jr.                                      ___________________________________________________________________________  Witness signature                                                                    ___________________________________________________________________________  Provider signature- Anisa Bush CNP

## 2021-06-18 NOTE — PROGRESS NOTES
Assessment / Plan:     Diagnoses and all orders for this visit:    Lumbalgia  -     HYDROcodone-acetaminophen 5-325 mg per tablet; Take 1 tablet by mouth at bedtime as needed for pain.  Dispense: 25 tablet; Refill: 0    Lumbar radiculitis  -     Ambulatory referral to Physical Therapy  -     HYDROcodone-acetaminophen 5-325 mg per tablet; Take 1 tablet by mouth at bedtime as needed for pain.  Dispense: 25 tablet; Refill: 0    Lumbar disc herniation  -     Ambulatory referral to Physical Therapy    Cervicalgia  -     Ambulatory referral to Physical Therapy  -     HYDROcodone-acetaminophen 5-325 mg per tablet; Take 1 tablet by mouth at bedtime as needed for pain.  Dispense: 25 tablet; Refill: 0  -     gabapentin (NEURONTIN) 300 MG capsule; Take 1 capsule (300 mg total) by mouth 3 (three) times a day.  Dispense: 180 capsule; Refill: 0    Cervical radiculitis  -     Ambulatory referral to Physical Therapy  -     HYDROcodone-acetaminophen 5-325 mg per tablet; Take 1 tablet by mouth at bedtime as needed for pain.  Dispense: 25 tablet; Refill: 0  -     gabapentin (NEURONTIN) 300 MG capsule; Take 1 capsule (300 mg total) by mouth 3 (three) times a day.  Dispense: 180 capsule; Refill: 0    Cervical disc herniation  -     Ambulatory referral to Physical Therapy  -     gabapentin (NEURONTIN) 300 MG capsule; Take 1 capsule (300 mg total) by mouth 3 (three) times a day.  Dispense: 180 capsule; Refill: 0    Myofascial pain  -     Ambulatory referral to Physical Therapy  -     HYDROcodone-acetaminophen 5-325 mg per tablet; Take 1 tablet by mouth at bedtime as needed for pain.  Dispense: 25 tablet; Refill: 0  -     gabapentin (NEURONTIN) 300 MG capsule; Take 1 capsule (300 mg total) by mouth 3 (three) times a day.  Dispense: 180 capsule; Refill: 0    Lumbar radiculopathy  -     gabapentin (NEURONTIN) 300 MG capsule; Take 1 capsule (300 mg total) by mouth 3 (three) times a day.  Dispense: 180 capsule; Refill: 0          Rex J  Varun Cervantes is a 48 y.o. y.o. male with past medical history significant for  Crohn disease, gout, hypertension, diverticulosis, obesity, hyperlipidemia, anxiety, who presents today for follow-up regarding bilateral low back pain with radicular pain, to the anterior medial lower extremity.  Patient symptoms are likely due to the moderate disc bulge at the L3-L4 that is causing severe right neural foraminal stenosis, and mild to moderate left neural foraminal stenosis at the L3-L4, that is probably contributing to patient symptoms of the radicular pain to the lower extremity.  Cervicalgia with radicular pain and paresthesias to the upper extremity bilaterally with the left side being worse than the right side.  The cervical MRI shows central disc osteophyte complex at the C5-C6 with severe facet arthropathy that is contributing to bilateral neuroforaminal stenosis that could be contributing to patient's symptoms of the pain and paresthesias to the upper extremity bilaterally.  No red flags.      A shared decision making plan was used.  The patient's values and choices were respected.  The following represents what was discussed and decided upon by the physician and the patient.      1.  DIAGNOSTIC TESTS: I reviewed the lumbar, cervical MRI imaging and the report with the patient today.  The patient verbalizes understanding.  2.  PHYSICAL THERAPY: I ordered physical therapy today.  Patient will start on physical therapy program   3.  MEDICATIONS: Patient will continue on gabapentin 300 mg.  I recommend patient to increase gabapentin 10 mg into 1 tablet 3 times a day.  4.  INTERVENTIONS: If his symptoms of the radicular pain and numbness and tingling to the lower extremity does not improve the PT and medication we will consider bilateral L3-L4 transforaminal epidural steroid injection.  If his symptoms of the upper extremity pain and numbness and tingling does not improve with PT and medication we will consider EMG  testing for the upper extremity.  Patient is in agreement with the plan.  5.  PATIENT EDUCATION: I thoroughly discussed the plan with the patient today.  He verbalizes understanding and agrees with the plan.  6.  FOLLOW-UP: Patient will follow up with me in 6  weeks.  All questions were answered.            Subjective:     Rex Bond Jr. is a 48 y.o. male who presents today for follow-up regarding bilateral low back pain, radicular pain and numbness and tingling to the anterior, medial thighs, and legs.  Today patient returns and reports mild improvement in his symptoms however he still has the radicular pain and numbness and tingling to the lower extremity and rates it at 5 out of 10 intensity pain.  His lumbar MRI showed moderate disc bulge at the L3-L4 that is causing bilateral neural foraminal stenosis at the right being worse than the left that is probably contributing to patient symptoms of the radicular pain to the lower extremity.     Patient also reports neck pain with radicular pain and numbness and tingling to the upper extremity in nondermatomal fashion.  Patient states that sometimes he will feel the numbness and tingling in the fourth and the fifth digits bilaterally, and sometimes it will cover the whole middle 3 fingers.  His left upper extremity pain and numbness and tingling is worse than the right upper extremity.  The cervical MRI showed disc protrusion at the C3-C4, C4-C5, C5-C6 as well as mild to moderate spinal canal stenosis at the C7-T1.  Patient denies having EMG testing to the upper extremity bilaterally in the past.  Patient is taking gabapentin 300 mg 1 tablets 3 times daily.  Patient stated that prednisone course helped a lot with his symptoms.Patient denies urinary or bowel incontinence, unintentional weight loss, saddle anesthesia, fever or chills, balance difficulties.    Review of Systems:  Low back pain with radicular pain to the anterior medial or lower extremity  bilaterally.  Neck pain with radicular pain and paresthesias to the upper extremity bilaterally. Patient denies urinary or bowel incontinence, unintentional weight loss, saddle anesthesia, fever or chills, balance difficulties.       Objective:   CONSTITUTIONAL:  Vital signs as above.  No acute distress.  The patient is well nourished and well groomed.    PSYCHIATRIC:  The patient is awake, alert, oriented to person, place and time.  The patient is answering questions appropriately with clear speech.  Normal affect.  SKIN:  Skin over the face, posterior torso, bilateral upper and lower extremities is clean, dry, intact without rashes.  MUSCULOSKELETAL:  Gait is non-antalgic.  The patient is able to heel and toe walk without any difficulty.  Mild tenderness over the L4-L5, L5-S1.  Lumbar paraspinal muscles.      The patient has 5/5 strength for the bilateral hip flexors, knee flexors/extensors, ankle dorsiflexors/plantar flexors, ankle evertors/invertors.    NEUROLOGICAL:  2/4 patellar, medial hamstring, achilles reflexes which are symmetric bilaterally.  No ankle clonus bilaterally.  Sensation to light touch is intact in the bilateral L4, L5, and S1 dermatomes.  Negative straight leg raise bilaterally.  Negative hip impingement Shabbir test bilaterally.     MUSCULOSKELETAL: The patient has 5/5 strength for the bilateral shoulder abductors, elbow flexors/extensors, wrist extensors, finger flexors/abductors.   NEUROLOGICAL:  2/4  Biceps, brachioradialis, triceps reflexes bilaterally.  Negative Abraham's bilaterally.  Sensation to pinprick is intact.  Positive Spurling test on the left.  Decreased range of motion of the cervical spine due to pain.  Tenderness present at the upper trapezius bilaterally.       RESULTS:      [facility]  MR CERVICAL SPINE WO CONTRAST, MR LUMBAR SPINE WO CONTRAST  5/17/2018 8:25 PM      INDICATION: Neck pain with radiation into bilateral arms, left hand numbness cervical radiculitis  in the c8 nerve dermatome  TECHNIQUE: Without IV contrast.  CONTRAST: None  COMPARISON: None.     FINDINGS:  MRI CERVICAL SPINE:  Normal vertebral body heights, alignment and marrow signal. Normal appearance of the craniocervical junction and C1-C2. No abnormal cord signal. The visualized intracranial contents are unremarkable. Grossly normal paraspinal soft tissues. The vertebral   artery flow voids are preserved.     C2-C3: Normal disc height. No herniation. Moderate right and no left facet arthropathy. No spinal canal stenosis. No right neural foraminal stenosis. No left neural foraminal stenosis.     C3-C4: Normal disc height. Small central disc protrusion. Mild to moderate bilateral facet arthropathy. Mild spinal canal stenosis. Moderate to severe right neural foraminal stenosis. Severe left neural foraminal stenosis.     C4-C5: Normal disc height. Central disc osteophyte complex. Moderate to severe bilateral facet arthropathy. Mild to moderate spinal canal stenosis. Severe right neural foraminal stenosis. Mild to moderate left neural foraminal stenosis.       C5-C6: Normal disc height. Central disc osteophyte complex. Severe bilateral facet arthropathy. Mild to moderate spinal canal stenosis. Moderate right neural foraminal stenosis. Moderate to severe left neural foraminal stenosis.     C6-C7: Normal disc height. Central disc osteophyte complex. Mild bilateral facet arthropathy. Mild to moderate spinal canal stenosis. Mild right neural foraminal stenosis. Mild left neural foraminal stenosis.     C7-T1: Normal disc height. No herniation. Mild bilateral facet arthropathy. Mild to moderate spinal canal stenosis. No right neural foraminal stenosis. No left neural foraminal stenosis.     MRI LUMBAR SPINE:  Nomenclature is based on 5 lumbar type vertebral bodies. Normal vertebral body heights. Mild retrolisthesis of L3 on L4. There is a T1-weighted hypointense lesion in the posterior right aspect of the L3 inferior  endplate which is hyperintense on STIR.   The morphologic appearance is most suggestive of a degenerative Schmorl's node. No suspicious bone marrow signal abnormality identified. No pars defect. The conus tip is identified at L1. T2-weighted hyperintense lesion in the left kidney may represent a   renal cyst. No abdominal aortic aneurysm. The visualized portions of the bony pelvis are normal for age.     T12-L1: Normal disc height and signal. No herniation. No facet arthropathy. No spinal canal stenosis. No right neural foraminal stenosis. No left neural foraminal stenosis.     L1-L2: Normal disc height. Mild loss of T2-weighted signal in the disc. Mild disc bulge. Mild bilateral facet arthropathy. Mild spinal canal stenosis. No right neural foraminal stenosis. No left neural foraminal stenosis.     L2-L3: Normal disc height. Mild loss of T2-weighted signal in the disc. Moderate disc bulge which contacts and may mildly compress the traversing left L3 nerve root. It contacts but does not definitely compress the traversing right L3 nerve root.   Moderate bilateral facet arthropathy. Mild to moderate spinal canal stenosis. No right neural foraminal stenosis. No left neural foraminal stenosis.     L3-L4: Mild to moderate loss of disc height and moderate loss of T2-weighted signal in the disc. Central annular fissure. Moderate disc bulge. Right subarticular/foraminal disc protrusion which contacts and posteriorly displaces the traversing right L4   nerve root. Moderate bilateral facet arthropathy. Mild spinal canal stenosis. The facet hypertrophy contacts and compresses the foraminal portion of the exiting right L3 nerve root within the neural foramen. There is severe right neural foraminal   stenosis. Mild to moderate left neural foraminal stenosis.     L4-L5: Moderate to severe loss of disc height and T2-weighted signal in the disc. L4 laminectomy. Moderate disc bulge with severe narrowing of the bilateral lateral  recesses. Severe bilateral facet arthropathy. Mild spinal canal stenosis. Mild right   neural foraminal stenosis. Mild left neural foraminal stenosis.     L5-S1: Normal disc height. Mild loss of T2-weighted signal in the disc. Small central disc protrusion. Moderate bilateral No facet arthropathy. No spinal canal stenosis. No right neural foraminal stenosis. No left neural foraminal stenosis.     IMPRESSION:   CONCLUSION:  MRI CERVICAL SPINE:  1.  There is mild diffuse congenital spinal canal stenosis with superimposed degenerative changes.     2.   At C3-C4, there is moderate to severe right and severe left neural foraminal stenosis.     3.  At C4-C5, there is severe right neural foraminal stenosis.     4.  At C5-C6, there is moderate right and moderate to severe left neural foraminal stenosis.     5.  At C7-T1, there is mild to moderate spinal canal stenosis.     MRI LUMBAR SPINE:   1.  At L3-L4, there is a right subarticular/foraminal disc protrusion which contacts and posteriorly displaces the traversing right L4 nerve root. Moderate facet arthropathy contacts and compresses the foraminal portion of the exiting right L3 nerve root   within the neural foramen with associated severe right neural foraminal stenosis.     2.  At L4-L5, there is severe bilateral facet arthropathy.     3.  Moderate disc bulge with a left subarticular.

## 2021-06-19 NOTE — LETTER
Letter by Anisa Bush CNP at      Author: Anisa Bush CNP Service: -- Author Type: --    Filed:  Encounter Date: 4/16/2019 Status: (Other)         Rex LISA Varun Carranza.  [facility] [facility] [address]             April 16, 2019         Dear Mr. Bond,    Below are the results from your recent visit:    Resulted Orders   Testosterone, Free and Total   Result Value Ref Range    Testosterone, Total 547 240 - 950 ng/dL      Comment:      This test was developed and its performance characteristics determined by the  [facility],  Special [facility] Laboratory. It has  not been cleared or approved by the FDA. The laboratory is regulated under CLIA  as qualified to perform high-complexity testing. This test is used for clinical  purposes. It should not be regarded as investigational or for research.    Sex Hormone Bind Globulin 37 11 - 80 nmol/L    Free Testosterone Calc 10.86 4.7 - 24.4 ng/dL      Comment:      Performed and/or entered by:  [address] [address]    Thyroid Cascade   Result Value Ref Range    TSH 0.69 0.30 - 5.00 uIU/mL     Your lab results are normal.     Please call with questions or contact us using Som[employer].    Sincerely,        Electronically signed by Anisa Bush CNP

## 2021-06-19 NOTE — LETTER
Letter by Anisa Bush CNP at      Author: Anisa Bush CNP Service: -- Author Type: --    Filed:  Encounter Date: 5/1/2019 Status: (Other)         Rex Bond Jr.  [facility] [facility] [address]             May 1, 2019         Dear Kylee Bond,    Below are the results from your recent visit:    Resulted Orders   Magnesium   Result Value Ref Range    Magnesium 2.2 1.8 - 2.6 mg/dL   Comprehensive Metabolic Panel   Result Value Ref Range    Sodium 141 136 - 145 mmol/L    Potassium 4.4 3.5 - 5.0 mmol/L    Chloride 102 98 - 107 mmol/L    CO2 30 22 - 31 mmol/L    Anion Gap, Calculation 9 5 - 18 mmol/L    Glucose 94 70 - 125 mg/dL    BUN 15 8 - 22 mg/dL    Creatinine 1.04 0.70 - 1.30 mg/dL    GFR MDRD Af Amer >60 >60 mL/min/1.73m2    GFR MDRD Non Af Amer >60 >60 mL/min/1.73m2    Bilirubin, Total 0.5 0.0 - 1.0 mg/dL    Calcium 9.9 8.5 - 10.5 mg/dL    Protein, Total 7.2 6.0 - 8.0 g/dL    Albumin 4.4 3.5 - 5.0 g/dL    Alkaline Phosphatase 67 45 - 120 U/L    AST 28 0 - 40 U/L    ALT 32 0 - 45 U/L    Narrative    Fasting Glucose reference range is 70-99 mg/dL per  American Diabetes Association (ADA) guidelines.       Your lab results are normal.     Please call with questions or contact us using tict.    Sincerely,        Electronically signed by Anisa Bush CNP

## 2021-06-20 NOTE — LETTER
Letter by Anisa Bush CNP at      Author: Anisa Bush CNP Service: -- Author Type: --    Filed:  Encounter Date: 10/5/2020 Status: (Other)         Rex Bond Jr.  [address] [facility] [address]      October 5, 2020      Dear Rex Bond Jr.,    Per our refill protocol, you are due for a medication check office visit. Your prescription for ADDERALL was sent to your pharmacy (10.4.2020). Please call [phone] to schedule an VIRTUAL VISIT with ANISA BUSH  before your next refill is needed to avoid delays.    Thank you,  [facility]

## 2021-06-21 NOTE — LETTER
Letter by Anisa Bush CNP at      Author: Anisa Bush CNP Service: -- Author Type: --    Filed:  Encounter Date: 5/6/2021 Status: (Other)       Non-Opioid Controlled Substance Agreement    This is an agreement between you and your provider regarding safe and appropriate controlled substance prescribing.? Controlled substances are?medicines that can cause physical and mental dependence. The manufacturing, possession and use of these medicines are regulated by law.  We here at [facility] are making a commitment to work with you in your efforts to get better.? To support you in this work, we will help you schedule regular appointments for medicine refills. If we must cancel or change your appointment for any reason, we will make sure you have enough medication to last until your next appointment.      As a Provider, I will:     Listen carefully to your concerns while treating you with dignity.     Recommend a treatment plan that I believe is in your best interest and may involve therapies other than medication.      Review the chance of benefit and the chance of harm of this medicine with you regularly and evaluate the safety and effectiveness of this therapy.       Provide a plan on how to discontinue if the decision is made to stop this medicine.       As a Patient, I understand controlled substances:       Are prescribed by my care provider to help me function or work and manage my condition(s).?    Are strong medicines and can cause serious side effects.      Need to be taken exactly as prescribed.?Combining controlled substances with certain medicines or chemicals (such as illegal drugs, alcohol, sedatives, sleeping pills, and benzodiazepines) can be dangerous or even fatal.? If I stop taking my medicines suddenly, I may have severe withdrawal symptoms.     The risks, benefits, and side effects of these medicine(s) were explained to me. I agree that:    1. I will take part in other treatments as  advised by my care team. This may be psychiatry or counseling, physical therapy, behavioral therapy, group treatment or a referral to specialist.    2. I will keep all my appointments and understand this is part of the monitoring of controlled substance.?My care team may require an office visit for EVERY controlled substance refill. If I miss appointments or dont follow instructions, my care team may stop my medicine    3. I will take my medicines as prescribed. I will not change the dose or schedule unless my care team tells me to. There will be no refills if I run out early.      4. I may be contactedwithout warning and asked to complete a urine drug test or pill count at any time. If I dont give a urine sample or participate in a pill count, the care team may stop my medicine.    5. I will only receive controlled substance prescriptions from this clinic. If treated by another provider, I will let them know I am taking controlled substances and that I have a treatment agreement with this provider. I will inform this care team within one business day if I am given a prescription for any controlled substance by another healthcare provider. This care team may contact other providers and pharmacists about my use of the medicines.     6. It is up to me to make sure that I do not run out of my medicines on weekends or holidays.?If my care team is willing to refill my prescription without a visit, I must request refills only during office hours. Refills may take up to 3 business days to process.  I will use one pharmacy to fill all my controlled substance prescriptions.  I will notify the clinic if any changes are made due to insurance changes or medication availability.    7. I am responsible for my prescriptions. If the medicine/prescription is lost, stolen or destroyed, it will not be replaced.?I also agree not to share controlled substance medicines with anyone.     8. I am aware I should not use any illegal or  recreational drugs. I agree not to drink alcohol unless my care team says I can.     9. If I enroll in the [place] [facility] Cannabis program, I will tell my care team.?    10. I will tell my care team right away if I become pregnant, have a new medical problem treated outside of my regular clinic, or have a change in my medications.     11. I understand that this medicine can affect my thinking, judgment and reaction time.? Alcohol and drugs affect the brain and body, which can affect the safety of a persons driving. Being under the influence of alcohol or drugs can affect a persons decision-making, behaviors, personal safety, and the safety of others. Driving while impaired (DWI) can occur if a person is driving, operating, or in physical control of a car, motorcycle, boat, snowmobile, ATV, motorbike, off-road vehicle, or any other motor vehicle ([place] Statute 169A.20). I understand the risk if I choose to drive or operate machinery  I understand that if I do not follow any of the conditions above, my prescriptions or treatment may be stopped or changed.     I agree that my provider, clinic care team, and pharmacy may work with any city, state or federal law enforcement agency that investigates the misuse, sale, or other diversion of my controlled medicine. I will allow my provider to discuss my care with or share a copy of this agreement with any other treating provider, pharmacy or emergency room where I receive care.?    I have read this agreement and have asked questions about anything I did not understand.    ________________________________________________________  Patient Signature - Rex Bond Jr.     ___________________                   Date     ________________________________________________________  Provider Signature - Anisa Bush CNP       ___________________                   Date     ________________________________________________________  Witness Signature (required if provider not present  while patient signing)          ___________________                   Date

## 2021-06-21 NOTE — PROGRESS NOTES
Assessment and Plan:   1. Routine general medical examination at a health care facility  Discussed consuming a healthy diet and exercising.  Discussed importance routine sunscreen.  He is up-to-date on vaccinations.    2. Hypertension  This is controlled with losartan-hydrochlorothiazide.    3. Mixed hyperlipidemia  Patient states his cholesterol was checked through his workplace and was normal.  He plans on providing a copy of these results.    4. Gout  He continues allopurinol per for prophylaxis.    5. Morbid obesity (H)  The following high BMI interventions were performed this visit: encouragement to exercise and lifestyle education regarding diet    6. Regional enteritis (H)  He continues to see gastroenterology.  He denies any recent Crohn's flares.    7. Anxiety  Will increase venlafaxine to 75 mg daily.  Educated on its indications and side effects.  He will notify me via my chart in 1 month if he is doing well.  If not, I encouraged follow-up in clinic for dosing adjustments.  He is content with the plan.  - venlafaxine (EFFEXOR XR) 75 MG 24 hr capsule; Take 1 capsule (75 mg total) by mouth daily.  Dispense: 90 capsule; Refill: 1    8. Health care maintenance  - Influenza, Seasonal,Quad Inj, 36+ MOS      Subjective:     Rex is a 48 y.o. male presenting to the clinic for a male physical and other concerns today.  Patient is  with 2 children.  He has no concerns for STDs.  He does not consume a healthy diet.  He works in construction and concrete for exercise.  He has a history of irritability and mood swings.  He is currently taking venlafaxine 37.5 mg daily.  This was controlling his symptoms, but over the past month, he has noticed an increase in his irritability.  He is finding that he needs to occasionally refrain from speaking because he feels as though he is going to yell.  His wife has noticed a difference.  He denies thoughts of suicide.  He has decreased his alcohol intake and will go  weeks without consuming alcohol.  He had lipid and glucose screening at work which was normal.  He plans on bringing a copy of the labs into the clinic.  He is taking losartan-hydrochlorothiazide for hypertension which is controlled.  He denies chest pain, shortness of breath with exertion, edema, orthopnea, syncope.  He is taking gabapentin due to lower lumbar radiculopathy.  His symptoms are tolerable.  He is taking allopurinol 300 mg daily for gout prophylaxis.  He denies any recent gout flares.    Review of Systems: A complete 14 point review of systems was obtained and is negative or as stated in the history of present illness.    Social History     Social History     Marital status:      Spouse name: Lizzette     Number of children: 2     Years of education: N/A     Occupational History     [employer]     Social History Main Topics     Smoking status: Never Smoker     Smokeless tobacco: Current User     Types: Chew     Alcohol use 0.5 oz/week     1 Standard drinks or equivalent per week      Comment: occasionally mostly on the weekend, rarely drinks     Drug use: No     Sexual activity: Yes     Partners: Female     Birth control/ protection: Surgical      Comment: vasectomy     Other Topics Concern     Not on file     Social History Narrative    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               Active Ambulatory Problems     Diagnosis Date Noted     Serum Enzyme Levels - ALT (SGPT) Elevated      Crohn's Disease      Cramp Of Limb      Esophageal Reflux      Acute Gout      Gout      Hypertension      Herniated Intervertebral Disc      Diverticulosis      Soft Tissue Neoplasm Of The Abdomen      Testicular Neoplasm      Bright Red Blood Per Rectum      Arthropathy Of The Ankle / Foot      Maisha-rectal abscess 04/14/2015     Diverticulosis of intestine without bleeding 10/06/2015     Internal hemorrhoids 10/06/2015     Chewing tobacco use 03/23/2016     Obesity 03/23/2016      "Hyperlipidemia 03/23/2016     Anxiety 03/23/2016     Furuncle of forehead 03/01/2017     Resolved Ambulatory Problems     Diagnosis Date Noted     Hypokalemia      Skin Symptoms      Headache      Anxiety      Foot Pain (Soft Tissue)      Acrochordon      Motion sickness      Lower Back Pain      Hyperlipidemia      Past Medical History:   Diagnosis Date     Anxiety      Crohn disease (H)      Diverticulosis      Esophageal reflux      Gout      Herniated disc      HTN (hypertension)      Hyperlipidemia      Hypokalemia      Motion sickness      Pancreatic cancer (H)        Family History   Problem Relation Age of Onset     Hypertension Mother      Thyroid disease Mother      Diabetes Father      Hypertension Father      Heart disease Paternal Grandfather      Stroke Paternal Grandmother      Stroke Maternal Grandmother      Throat cancer Maternal Grandmother      Laryngeal cancer       Objective:     /78 (Patient Site: Right Arm, Patient Position: Sitting, Cuff Size: Adult Large)  Pulse 80  Temp 98.3  F (36.8  C) (Oral)   Resp 20  Ht 5' 11.5\" (1.816 m)  Wt (!) 254 lb 12.8 oz (115.6 kg)  BMI 35.04 kg/m2    General Appearance:    Alert, cooperative, no distress, appears stated age   Head:    Normocephalic, without obvious abnormality, atraumatic   Eyes:    PERRL, conjunctiva/corneas clear, EOM's intact, fundi     benign, both eyes        Ears:    Normal TM's and external ear canals, both ears   Nose:   Nares normal, septum midline, mucosa normal, no drainage    or sinus tenderness   Throat:   Lips, mucosa, and tongue normal; teeth and gums normal   Neck:   Supple, symmetrical, trachea midline, no adenopathy;        thyroid:  No enlargement/tenderness/nodules; no carotid    bruit or JVD   Back:     Symmetric, no curvature, ROM normal, no CVA tenderness   Lungs:     Clear to auscultation bilaterally, respirations unlabored   Chest wall:    No tenderness or deformity   Heart:    Regular rate and rhythm, S1 " and S2 normal, no murmur, rub    or gallop   Abdomen:     Soft, non-tender, bowel sounds active all four quadrants,     no masses, no organomegaly   Genitalia:    Deferred per patient    Rectal:    Deferred per patient    Extremities:   Extremities normal, atraumatic, no cyanosis or edema   Pulses:   2+ and symmetric all extremities   Skin:   Skin color, texture, turgor normal, no rashes or lesions   Lymph nodes:   Cervical, supraclavicular, and axillary nodes normal   Neurologic:   CNII-XII intact. Normal strength, sensation and reflexes       throughout

## 2021-06-23 NOTE — TELEPHONE ENCOUNTER
Prior Authorization Request  Who s requesting:  Spouse, Lizzette. Consent to communicate is on file  Pharmacy Name and Location: [facility]  Medication Name: dextroamphetamine-amphetamine (ADDERALL XR) 20 MG 24 hr capsule  Insurance Plan: Blue Cross / Blue Shield  Insurance Member ID Number:  [id number]  Informed patient that prior authorizations can take up to 10 business days for response:   Yes  Okay to leave a detailed message: Yes

## 2021-06-23 NOTE — TELEPHONE ENCOUNTER
Central PA team  [phone]    PA has been initiated.     Initiated via manual fax to [email or web address] [phone]  Will await determination

## 2021-06-23 NOTE — TELEPHONE ENCOUNTER
RN cannot approve Refill Request    RN can NOT refill this medication medication not on med list.       Mattie Santana, [facility] Triage/Med Refill 1/11/2019    Requested Prescriptions   Pending Prescriptions Disp Refills     omeprazole (PRILOSEC) 20 MG capsule [Pharmacy Med Name: OMEPRAZOLE 20MG CAPSULES] 90 capsule 0     Sig: TAKE 1 CAPSULE BY MOUTH EVERY DAY    GI Medications Refill Protocol Passed - 1/10/2019  2:24 PM       Passed - PCP or prescribing provider visit in last 12 or next 3 months.    Last office visit with prescriber/PCP: 6/24/2015 Earnestine Macedo MD OR same dept: 5/2/2018 Anisa Bush CNP OR same specialty: 5/2/2018 Anisa Bush CNP  Last physical: Visit date not found Last MTM visit: Visit date not found   Next visit within 3 mo: Visit date not found  Next physical within 3 mo: Visit date not found  Prescriber OR PCP: Earnestine Macedo MD  Last diagnosis associated with med order: 1. Esophageal reflux  - omeprazole (PRILOSEC) 20 MG capsule [Pharmacy Med Name: OMEPRAZOLE 20MG CAPSULES]; TAKE 1 CAPSULE BY MOUTH EVERY DAY  Dispense: 90 capsule; Refill: 0    If protocol passes may refill for 12 months if within 3 months of last provider visit (or a total of 15 months).

## 2021-06-23 NOTE — PROGRESS NOTES
Assessment and Plan:     1. Attention deficit hyperactivity disorder (ADHD), combined type  We will obtain records from [facility].  I discussed that I am willing to prescribe 1 month worth of medication, but he will need to have his records present at his follow-up appointment.  Discussed treatment options.  Will start Adderall XR 20 mg daily.  Educated on its indications and side effects.  Will monitor blood pressure.  Obtained controlled substance agreement.  He will follow-up in 1 month.  - dextroamphetamine-amphetamine (ADDERALL XR) 20 MG 24 hr capsule; Take 1 capsule (20 mg total) by mouth every morning.  Dispense: 30 capsule; Refill: 0    2. Anxiety  He continues venlafaxine.  This is controlled.    3. Hypertension  This is controlled with losartan-hydrochlorothiazide.  We will continue to monitor.  He is content with the plan.    Subjective:     Rex is a 48 y.o. male presenting to the clinic for medication management.  Patient was diagnosed with ADHD through [facility].  Unfortunately, I do not have these records.  Patient states since childhood, he has had difficulty concentrating.  He forgets to complete tasks.  He does not feel as though he retains information that he hears from others.  Patient has felt hyper and irritable.  Patient did try his wife's Adderall and felt more calm while taking this.  He is interested in starting Adderall today.  He has hypertension and takes losartan-hydrochlorothiazide.  This is controlled.  He denies chest pain, shortness of breath with exertion, edema, orthopnea, syncope.  He has a history of anxiety and takes venlafaxine.  He states this is controlled.    Review of Systems: A complete 14 point review of systems was obtained and is negative or as stated in the history of present illness.    Social History     Socioeconomic History     Marital status:      Spouse name: Lizzette     Number of children: 2     Years of education: Not on file      Highest education level: Not on file   Social Needs     Financial resource strain: Not on file     Food insecurity - worry: Not on file     Food insecurity - inability: Not on file     Transportation needs - medical: Not on file     Transportation needs - non-medical: Not on file   Occupational History     Occupation: construction     Employer: [employer]   Tobacco Use     Smoking status: Never Smoker     Smokeless tobacco: Current User     Types: Chew   Substance and Sexual Activity     Alcohol use: Yes     Alcohol/week: 0.5 oz     Types: 1 Standard drinks or equivalent per week     Comment: occasionally mostly on the weekend, rarely drinks     Drug use: No     Sexual activity: Yes     Partners: Female     Birth control/protection: Surgical     Comment: vasectomy   Other Topics Concern     Not on file   Social History Narrative    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           Active Ambulatory Problems     Diagnosis Date Noted     Serum Enzyme Levels - ALT (SGPT) Elevated      Crohn's Disease      Cramp Of Limb      Esophageal Reflux      Acute Gout      Gout      Hypertension      Herniated Intervertebral Disc      Diverticulosis      Soft Tissue Neoplasm Of The Abdomen      Testicular Neoplasm      Bright Red Blood Per Rectum      Arthropathy Of The Ankle / Foot      Maisha-rectal abscess 04/14/2015     Diverticulosis of intestine without bleeding 10/06/2015     Internal hemorrhoids 10/06/2015     Chewing tobacco use 03/23/2016     Obesity 03/23/2016     Hyperlipidemia 03/23/2016     Anxiety 03/23/2016     Furuncle of forehead 03/01/2017     Obesity (BMI 35.0-39.9) with comorbidity (H) 10/30/2018     Resolved Ambulatory Problems     Diagnosis Date Noted     Hypokalemia      Skin Symptoms      Headache      Anxiety      Foot Pain (Soft Tissue)      Acrochordon      Motion sickness      Lower Back Pain      Hyperlipidemia      Past Medical History:   Diagnosis Date     Anxiety      Crohn disease  (H)      Diverticulosis      Esophageal reflux      Gout      Herniated disc      HTN (hypertension)      Hyperlipidemia      Hypokalemia      Motion sickness      Pancreatic cancer (H)        Family History   Problem Relation Age of Onset     Hypertension Mother      Thyroid disease Mother      Diabetes Father      Hypertension Father      Heart disease Paternal Grandfather      Stroke Paternal Grandmother      Stroke Maternal Grandmother      Throat cancer Maternal Grandmother         Laryngeal cancer       Objective:     /80   Pulse 80   Ht 6' (1.829 m)   Wt (!) 258 lb 9.6 oz (117.3 kg)   SpO2 97%   BMI 35.07 kg/m      Patient is alert, in no obvious distress.   Skin: Warm, dry.    Lungs:  Clear to auscultation. Respirations even and unlabored.  No wheezing or rales noted.   Heart:  Regular rate and rhythm.  No murmurs, S3, S4, gallops, or rubs.    Abdomen: Soft, nontender.  No organomegaly. Bowel sounds normoactive. No guarding or masses noted.

## 2021-06-24 NOTE — TELEPHONE ENCOUNTER
RN cannot approve Refill Request    RN can NOT refill this medication PCP messaged that patient is overdue for Labs.       Mattie Santana, [facility] Triage/Med Refill 2/28/2019    Requested Prescriptions   Pending Prescriptions Disp Refills     venlafaxine (EFFEXOR-XR) 75 MG 24 hr capsule [Pharmacy Med Name: VENLAFAXINE ER 75MG CAPSULES] 90 capsule 0     Sig: TAKE 1 CAPSULE(75 MG) BY MOUTH DAILY    Venlafaxine/Desvenlafaxine Refill Protocol Failed - 2/28/2019  3:09 PM       Failed - LFT or AST or ALT in last year    Albumin   Date Value Ref Range Status   09/25/2017 3.9 3.5 - 5.0 g/dL Final     Bilirubin, Total   Date Value Ref Range Status   09/25/2017 0.5 0.0 - 1.0 mg/dL Final     Bilirubin, Direct   Date Value Ref Range Status   03/23/2012 0.2 <0.6 mg/dL Final     Alkaline Phosphatase   Date Value Ref Range Status   09/25/2017 64 45 - 120 U/L Final     AST   Date Value Ref Range Status   09/25/2017 22 0 - 40 U/L Final     ALT   Date Value Ref Range Status   09/25/2017 23 0 - 45 U/L Final     Protein, Total   Date Value Ref Range Status   09/25/2017 6.8 6.0 - 8.0 g/dL Final               Failed - Fasting lipid cascade in last year    Cholesterol   Date Value Ref Range Status   03/22/2016 175 <=199 mg/dL Final     Triglycerides   Date Value Ref Range Status   03/22/2016 142 <=149 mg/dL Final     HDL Cholesterol   Date Value Ref Range Status   03/22/2016 42 >=40 mg/dL Final     LDL Calculated   Date Value Ref Range Status   03/22/2016 105 <=129 mg/dL Final     Patient Fasting > 8hrs?   Date Value Ref Range Status   03/22/2016 Yes  Final            Passed - PCP or prescribing provider visit in last year    Last office visit with prescriber/PCP: 2/4/2019 Anisa Bush CNP OR same dept: 2/4/2019 Anisa Bush CNP OR same specialty: 2/4/2019 Anisa Bush CNP  Last physical: 10/30/2018 Last MTM visit: Visit date not found   Next visit within 3 mo: Visit date not found  Next physical within 3 mo: Visit date  not found  Prescriber OR PCP: Anisa Bush, RATNA  Last diagnosis associated with med order: 1. Anxiety  - venlafaxine (EFFEXOR-XR) 75 MG 24 hr capsule [Pharmacy Med Name: VENLAFAXINE ER 75MG CAPSULES]; TAKE 1 CAPSULE(75 MG) BY MOUTH DAILY  Dispense: 90 capsule; Refill: 0    If protocol passes may refill for 12 months if within 3 months of last provider visit (or a total of 15 months).            Passed - Blood Pressure in last year    BP Readings from Last 1 Encounters:   02/04/19 130/80

## 2021-06-24 NOTE — PROGRESS NOTES
Assessment and Plan:     1. Attention deficit hyperactivity disorder (ADHD), combined type  Provided prescription for Adderall XR 20 mg daily.  I am reluctant to increase the dose as I do not have his records.  Patient plans on traveling to [facility] today to obtain the records.  Recommend follow-up in 1 month.  - dextroamphetamine-amphetamine (ADDERALL XR) 20 MG 24 hr capsule; Take 1 capsule (20 mg total) by mouth every morning.  Dispense: 30 capsule; Refill: 0    2. Anxiety  Patient would like to wean off of the venlafaxine.  Will decrease dose to 75 mg daily.  He decided the would like tosee how he will tolerate this.  In 1 month, if he is doing well, will decrease dose to 37.5 mg.  - venlafaxine (EFFEXOR-XR) 75 MG 24 hr capsule; TAKE 1 CAPSULE(75 MG) BY MOUTH DAILY  Dispense: 30 capsule; Refill: 0    3. Morbid obesity (H)  The following high BMI interventions were performed this visit: encouragement to exercise and lifestyle education regarding diet    4. Hypertension  This is controlled with losartan-hydrochlorothiazide.    The patient is content with the plan and will follow-up in 1 month for medication management or sooner with any further concerns.     Subjective:     Rex is a 48 y.o. male presenting to the clinic for medication management.  Patient was diagnosed with ADHD through [facility].  He was seen on 2/4/19 where he was treated with Adderall XR 20 mg daily.  He tolerates medication well without side effects.  For years, he has been experiencing irritability.  Patient feels as though the cause of his irritability is the ADHD.  Since he started the medication, he has felt more calm and less on edge throughout the day.  He denies any side effects to the medication.  He is sleeping well at night.  He does occasionally feel as though the medication wears off in the afternoon.  Patient is interested in weaning off of the venlafaxine.  He does not feel as though it is working.   He does have hypertension which is controlled with losartan-hydrochlorothiazide.    Review of Systems: A complete 14 point review of systems was obtained and is negative or as stated in the history of present illness.    Social History     Socioeconomic History     Marital status:      Spouse name: Lizzette     Number of children: 2     Years of education: Not on file     Highest education level: Not on file   Occupational History     Occupation: construction     Employer: [employer]   Social Needs     Financial resource strain: Not on file     Food insecurity:     Worry: Not on file     Inability: Not on file     Transportation needs:     Medical: Not on file     Non-medical: Not on file   Tobacco Use     Smoking status: Never Smoker     Smokeless tobacco: Current User     Types: Chew   Substance and Sexual Activity     Alcohol use: Yes     Alcohol/week: 0.5 oz     Types: 1 Standard drinks or equivalent per week     Comment: occasionally mostly on the weekend, rarely drinks     Drug use: No     Sexual activity: Yes     Partners: Female     Birth control/protection: Surgical     Comment: vasectomy   Lifestyle     Physical activity:     Days per week: Not on file     Minutes per session: Not on file     Stress: Not on file   Relationships     Social connections:     Talks on phone: Not on file     Gets together: Not on file     Attends Sabianist service: Not on file     Active member of club or organization: Not on file     Attends meetings of clubs or organizations: Not on file     Relationship status: Not on file     Intimate partner violence:     Fear of current or ex partner: Not on file     Emotionally abused: Not on file     Physically abused: Not on file     Forced sexual activity: Not on file   Other Topics Concern     Not on file   Social History Narrative    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           Active Ambulatory Problems     Diagnosis Date Noted     Serum Enzyme Levels - ALT  (SGPT) Elevated      Crohn's Disease      Cramp Of Limb      Esophageal Reflux      Acute Gout      Gout      Hypertension      Herniated Intervertebral Disc      Diverticulosis      Soft Tissue Neoplasm Of The Abdomen      Testicular Neoplasm      Bright Red Blood Per Rectum      Arthropathy Of The Ankle / Foot      Maisha-rectal abscess 04/14/2015     Diverticulosis of intestine without bleeding 10/06/2015     Internal hemorrhoids 10/06/2015     Chewing tobacco use 03/23/2016     Obesity 03/23/2016     Hyperlipidemia 03/23/2016     Anxiety 03/23/2016     Furuncle of forehead 03/01/2017     Obesity (BMI 35.0-39.9) with comorbidity (H) 10/30/2018     Resolved Ambulatory Problems     Diagnosis Date Noted     Hypokalemia      Skin Symptoms      Headache      Anxiety      Foot Pain (Soft Tissue)      Acrochordon      Motion sickness      Lower Back Pain      Hyperlipidemia      Past Medical History:   Diagnosis Date     Anxiety      Crohn disease (H)      Diverticulosis      Esophageal reflux      Gout      Herniated disc      HTN (hypertension)      Hyperlipidemia      Hypokalemia      Motion sickness      Pancreatic cancer (H)        Family History   Problem Relation Age of Onset     Hypertension Mother      Thyroid disease Mother      Diabetes Father      Hypertension Father      Heart disease Paternal Grandfather      Stroke Paternal Grandmother      Stroke Maternal Grandmother      Throat cancer Maternal Grandmother         Laryngeal cancer       Objective:     /82   Pulse 84   Ht 6' (1.829 m)   Wt (!) 252 lb (114.3 kg)   SpO2 98%   BMI 34.18 kg/m       Patient is alert, in no obvious distress.   Skin: Warm, dry.   Lungs:  Clear to auscultation. Respirations even and unlabored.  No wheezing or rales noted.   Heart:  Regular rate and rhythm.  No murmurs.   Abdomen: Soft, nontender.  No organomegaly. Bowel sounds normoactive. No guarding or masses noted.

## 2021-06-25 NOTE — TELEPHONE ENCOUNTER
RN cannot approve Refill Request    RN can NOT refill this medication PCP messaged that patient is overdue for Labs.       Mattie Santana, [facility] Triage/Med Refill 3/20/2019    Requested Prescriptions   Pending Prescriptions Disp Refills     allopurinol (ZYLOPRIM) 300 MG tablet [Pharmacy Med Name: ALLOPURINOL 300MG TABLETS] 60 tablet 0     Sig: TAKE 1 TABLET(300 MG) BY MOUTH TWICE DAILY    Allopurinol/Febuxostat Refill Protocol  Failed - 3/18/2019  2:06 PM       Failed - LFT or AST or ALT in last 12 months    Albumin   Date Value Ref Range Status   09/25/2017 3.9 3.5 - 5.0 g/dL Final     Bilirubin, Total   Date Value Ref Range Status   09/25/2017 0.5 0.0 - 1.0 mg/dL Final     Bilirubin, Direct   Date Value Ref Range Status   03/23/2012 0.2 <0.6 mg/dL Final     Alkaline Phosphatase   Date Value Ref Range Status   09/25/2017 64 45 - 120 U/L Final     AST   Date Value Ref Range Status   09/25/2017 22 0 - 40 U/L Final     ALT   Date Value Ref Range Status   09/25/2017 23 0 - 45 U/L Final     Protein, Total   Date Value Ref Range Status   09/25/2017 6.8 6.0 - 8.0 g/dL Final               Passed - Visit with PCP or prescribing provider visit in past 12 months    Last office visit with prescriber/PCP: 3/4/2019 Anisa Bush CNP OR same dept: 3/4/2019 Anisa Bush CNP OR same specialty: 3/4/2019 Anisa Bush CNP  Last physical: 10/30/2018 Last MTM visit: Visit date not found   Next visit within 3 mo: Visit date not found  Next physical within 3 mo: Visit date not found  Prescriber OR PCP: Anisa Bush CNP  Last diagnosis associated with med order: 1. Acute Gout  - allopurinol (ZYLOPRIM) 300 MG tablet [Pharmacy Med Name: ALLOPURINOL 300MG TABLETS]; TAKE 1 TABLET(300 MG) BY MOUTH TWICE DAILY  Dispense: 60 tablet; Refill: 0    If protocol passes may refill for 12 months if within 3 months of last provider visit (or a total of 15 months).

## 2021-07-03 NOTE — ADDENDUM NOTE
Addendum Note by Laurel Joseph CMA at 10/22/2019 10:40 AM     Author: Laurel Joseph CMA Service: -- Author Type: Certified Medical Assistant    Filed: 10/31/2019 12:38 PM Date of Service: 10/22/2019 10:40 AM Status: Signed    : Laurel Joseph CMA (Certified Medical Assistant)    Encounter addended by: Laurel Joseph CMA on: 10/31/2019 12:38 PM      Actions taken: Diagnosis association updated, Order list changed

## 2021-07-03 NOTE — ADDENDUM NOTE
Addendum Note by Laurel Joseph CMA at 10/22/2019 10:40 AM     Author: Laurel Joseph CMA Service: -- Author Type: Certified Medical Assistant    Filed: 10/28/2019  3:11 PM Date of Service: 10/22/2019 10:40 AM Status: Signed    : Laurel Joseph CMA (Certified Medical Assistant)    Encounter addended by: Laurel Joseph CMA on: 10/28/2019  3:11 PM      Actions taken: Child order released for a procedure order

## 2021-10-11 ENCOUNTER — HEALTH MAINTENANCE LETTER (OUTPATIENT)
Age: 51
End: 2021-10-11

## 2022-01-30 ENCOUNTER — HEALTH MAINTENANCE LETTER (OUTPATIENT)
Age: 52
End: 2022-01-30

## 2022-09-24 ENCOUNTER — HEALTH MAINTENANCE LETTER (OUTPATIENT)
Age: 52
End: 2022-09-24

## 2023-03-13 NOTE — TELEPHONE ENCOUNTER
Who is calling:  Patient's wife, Lizzette  Reason for Call:    Lizzette states she will try and get to the clinic today to  hard copy of Adderall prescription for patient.  Date of last appointment with primary care: N/A  Okay to leave a detailed message: Yes      
No indicators present

## 2023-05-08 ENCOUNTER — HEALTH MAINTENANCE LETTER (OUTPATIENT)
Age: 53
End: 2023-05-08

## 2023-10-23 RX ORDER — HYDROCHLOROTHIAZIDE 25 MG/1
25 TABLET ORAL DAILY
COMMUNITY

## 2023-10-23 RX ORDER — LOSARTAN POTASSIUM 50 MG/1
50 TABLET ORAL DAILY
COMMUNITY

## 2023-10-24 ENCOUNTER — OFFICE VISIT (OUTPATIENT)
Dept: FAMILY MEDICINE | Facility: CLINIC | Age: 53
End: 2023-10-24
Payer: COMMERCIAL

## 2023-10-24 VITALS
DIASTOLIC BLOOD PRESSURE: 82 MMHG | HEIGHT: 72 IN | SYSTOLIC BLOOD PRESSURE: 121 MMHG | HEART RATE: 64 BPM | OXYGEN SATURATION: 98 % | WEIGHT: 266.7 LBS | BODY MASS INDEX: 36.12 KG/M2 | TEMPERATURE: 97.3 F | RESPIRATION RATE: 16 BRPM

## 2023-10-24 DIAGNOSIS — F41.1 GENERALIZED ANXIETY DISORDER: ICD-10-CM

## 2023-10-24 DIAGNOSIS — Z79.899 MEDICATION MANAGEMENT: ICD-10-CM

## 2023-10-24 DIAGNOSIS — F98.8 ATTENTION DEFICIT DISORDER (ADD) WITHOUT HYPERACTIVITY: Primary | ICD-10-CM

## 2023-10-24 DIAGNOSIS — Z23 NEED FOR IMMUNIZATION AGAINST INFLUENZA: ICD-10-CM

## 2023-10-24 DIAGNOSIS — I10 PRIMARY HYPERTENSION: ICD-10-CM

## 2023-10-24 LAB
AMPHETAMINES UR QL SCN: ABNORMAL
BARBITURATES UR QL SCN: ABNORMAL
BENZODIAZ UR QL SCN: ABNORMAL
BZE UR QL SCN: ABNORMAL
CANNABINOIDS UR QL SCN: ABNORMAL
FENTANYL UR QL: ABNORMAL
OPIATES UR QL SCN: ABNORMAL
PCP QUAL URINE (ROCHE): ABNORMAL

## 2023-10-24 PROCEDURE — 99214 OFFICE O/P EST MOD 30 MIN: CPT | Mod: 25 | Performed by: NURSE PRACTITIONER

## 2023-10-24 PROCEDURE — 80307 DRUG TEST PRSMV CHEM ANLYZR: CPT | Performed by: NURSE PRACTITIONER

## 2023-10-24 PROCEDURE — 90471 IMMUNIZATION ADMIN: CPT | Performed by: NURSE PRACTITIONER

## 2023-10-24 PROCEDURE — 90682 RIV4 VACC RECOMBINANT DNA IM: CPT | Performed by: NURSE PRACTITIONER

## 2023-10-24 RX ORDER — DEXTROAMPHETAMINE SACCHARATE, AMPHETAMINE ASPARTATE, DEXTROAMPHETAMINE SULFATE AND AMPHETAMINE SULFATE 3.75; 3.75; 3.75; 3.75 MG/1; MG/1; MG/1; MG/1
15 TABLET ORAL 2 TIMES DAILY
Qty: 60 TABLET | Refills: 0 | Status: SHIPPED | OUTPATIENT
Start: 2023-10-24 | End: 2023-11-17

## 2023-10-24 ASSESSMENT — PAIN SCALES - GENERAL: PAINLEVEL: NO PAIN (0)

## 2023-10-24 NOTE — PROGRESS NOTES
Assessment and Plan:     Attention deficit disorder (ADD) without hyperactivity  Discussed treatment options.  We will start Adderall 15 mg twice daily.  Educated on its indications and side effects.  I reviewed the PDMP and there are no concerns today.  Will obtain urine drug screen and controlled substance agreement.  He is to follow-up in 1 month.  We will monitor blood pressure.  - amphetamine-dextroamphetamine (ADDERALL) 15 MG tablet  Dispense: 60 tablet; Refill: 0    Primary hypertension  This is controlled.  He continues losartan 50 mg and hydrochlorothiazide 25 mg.    Generalized anxiety disorder  This is controlled with sertraline 150 mg daily.    Medication management  - Urine Drug Screen        Subjective:     Rex is a 53 year old male presenting to the clinic for medication management.  Patient has been seen at a clinic at his workplace.  He has hypertension which is controlled losartan 50 mg daily and hydrochlorothiazide 25 mg daily.  He monitors his blood pressure at work and it has been normal.  Patient is taking sertraline 150 mg daily for anxiety.  This is well controlled.  He has a history of ADHD and would like to resume medication.  He was taking Adderall 15 mg twice daily.  Patient states his mind has been racing.  He cannot complete tasks in a timely manner.  He is frustrated easily.  He denies thoughts of suicide.    Reviewof Systems: A complete 14 point review of systems was obtained and is negative or as stated in the history of present illness.    Social History     Socioeconomic History    Marital status:      Spouse name: Not on file    Number of children: Not on file    Years of education: Not on file    Highest education level: Not on file   Occupational History    Not on file   Tobacco Use    Smoking status: Some Days     Types: Dip, chew, snus or snuff    Smokeless tobacco: Current     Types: Chew   Substance and Sexual Activity    Alcohol use: Yes    Drug use: Not on file     Sexual activity: Not on file   Other Topics Concern    Not on file   Social History Narrative    Not on file     Social Determinants of Health     Financial Resource Strain: Low Risk  (10/21/2023)    Financial Resource Strain     Within the past 12 months, have you or your family members you live with been unable to get utilities (heat, electricity) when it was really needed?: No   Food Insecurity: Low Risk  (10/21/2023)    Food Insecurity     Within the past 12 months, did you worry that your food would run out before you got money to buy more?: No     Within the past 12 months, did the food you bought just not last and you didn t have money to get more?: No   Transportation Needs: Low Risk  (10/21/2023)    Transportation Needs     Within the past 12 months, has lack of transportation kept you from medical appointments, getting your medicines, non-medical meetings or appointments, work, or from getting things that you need?: No   Physical Activity: Not on file   Stress: Not on file   Social Connections: Not on file   Interpersonal Safety: Low Risk  (10/24/2023)    Interpersonal Safety     Do you feel physically and emotionally safe where you currently live?: Yes     Within the past 12 months, have you been hit, slapped, kicked or otherwise physically hurt by someone?: No     Within the past 12 months, have you been humiliated or emotionally abused in other ways by your partner or ex-partner?: No   Housing Stability: Low Risk  (10/21/2023)    Housing Stability     Do you have housing? : Yes     Are you worried about losing your housing?: No       Active Ambulatory Problems     Diagnosis Date Noted    Restless legs syndrome 09/03/2020    Idiopathic progressive polyneuropathy 09/03/2020    Hypertension 09/03/2020    Hyperlipidemia 03/23/2016    Generalized anxiety disorder 09/03/2020    Essential tremor 09/03/2020    Bilateral carpal tunnel syndrome 09/03/2020    Attention deficit disorder (ADD) without  hyperactivity 09/06/2019    Anxiety 03/23/2016    Chronic pain disorder 10/22/2019    Chewing tobacco use 03/23/2016    Displacement of intervertebral disc, site unspecified, without myelopathy 09/04/2020    Diverticulosis of intestine without bleeding 10/06/2015    Esophageal reflux 09/04/2020    Gout 09/04/2020    Internal hemorrhoids 10/06/2015    Morbid obesity (H) 10/30/2018    Neoplasm of testis 09/04/2020    Regional enteritis (H) 09/04/2020     Resolved Ambulatory Problems     Diagnosis Date Noted    Bright Red Blood Per Rectum      Past Medical History:   Diagnosis Date    Arthritis     Crohn disease (H)     Diverticulosis     Herniated disc     HTN (hypertension)     Hypokalemia     Motion sickness     Pancreatic cancer (H)     Pancreatic cancer (H)        Family History   Problem Relation Age of Onset    Hypertension Mother     Thyroid Disease Mother     Diabetes Father     Hypertension Father     Cancer Maternal Grandmother     Cerebrovascular Disease Maternal Grandmother     Cerebrovascular Disease Paternal Grandmother     Heart Disease Paternal Grandfather     Varicose Veins Mother     Coronary Artery Disease Father     Throat cancer Maternal Grandmother         Laryngeal cancer       Objective:     /82   Pulse 64   Temp 97.3  F (36.3  C) (Oral)   Resp 16   Ht 1.829 m (6')   Wt 121 kg (266 lb 11.2 oz)   SpO2 98%   BMI 36.17 kg/m      Patient is alert, in no obvious distress.   Skin: Warm, dry.    Lungs:  Clear to auscultation. Respirations even and unlabored.  No wheezing or rales noted.   Heart:  Regular rate and rhythm.  No murmurs.         Answers submitted by the patient for this visit:  General Questionnaire (Submitted on 10/21/2023)  Chief Complaint: Chronic problems general questions HPI Form  What is the reason for your visit today? : annual checkup  How many servings of fruits and vegetables do you eat daily?: 0-1  On average, how many sweetened beverages do you drink each day  (Examples: soda, juice, sweet tea, etc.  Do NOT count diet or artificially sweetened beverages)?: 3  How many minutes a day do you exercise enough to make your heart beat faster?: 30 to 60  How many days a week do you exercise enough to make your heart beat faster?: 5  How many days per week do you miss taking your medication?: 0

## 2023-10-24 NOTE — PROGRESS NOTES
Prior to immunization administration, verified patients identity using patient s name and date of birth. Please see Immunization Activity for additional information.     Screening Questionnaire for Adult Immunization    Are you sick today?   No   Do you have allergies to medications, food, a vaccine component or latex?   No   Have you ever had a serious reaction after receiving a vaccination?   No   Do you have a long-term health problem with heart, lung, kidney, or metabolic disease (e.g., diabetes), asthma, a blood disorder, no spleen, complement component deficiency, a cochlear implant, or a spinal fluid leak?  Are you on long-term aspirin therapy?   No   Do you have cancer, leukemia, HIV/AIDS, or any other immune system problem?   No   Do you have a parent, brother, or sister with an immune system problem?   No   In the past 3 months, have you taken medications that affect  your immune system, such as prednisone, other steroids, or anticancer drugs; drugs for the treatment of rheumatoid arthritis, Crohn s disease, or psoriasis; or have you had radiation treatments?   No   Have you had a seizure, or a brain or other nervous system problem?   No   During the past year, have you received a transfusion of blood or blood    products, or been given immune (gamma) globulin or antiviral drug?   No   For women: Are you pregnant or is there a chance you could become       pregnant during the next month?   No   Have you received any vaccinations in the past 4 weeks?   No     Immunization questionnaire answers were all negative.      Patient instructed to remain in clinic for 15 minutes afterwards, and to report any adverse reactions.     Screening performed by Serena Santana CMA on 10/24/2023 at 10:07 AM.       Answers submitted by the patient for this visit:  General Questionnaire (Submitted on 10/21/2023)  Chief Complaint: Chronic problems general questions HPI Form  What is the reason for your visit today? : annual  checkup  How many servings of fruits and vegetables do you eat daily?: 0-1  On average, how many sweetened beverages do you drink each day (Examples: soda, juice, sweet tea, etc.  Do NOT count diet or artificially sweetened beverages)?: 3  How many minutes a day do you exercise enough to make your heart beat faster?: 30 to 60  How many days a week do you exercise enough to make your heart beat faster?: 5  How many days per week do you miss taking your medication?: 0

## 2023-10-24 NOTE — LETTER
[employer]  10/24/23  Patient: Rex Bond  YOB: 1970  Medical Record Number: [id number]                                                                                  Non-Opioid Controlled Substance Agreement    This is an agreement between you and your provider regarding safe and appropriate use of controlled substances prescribed by your care team. Controlled substances are?medicines that can cause physical and mental dependence (abuse).     There are strict laws about having and using these medicines. We here at [facility] are  committed to working with you in your efforts to get better. To support you in this work, we'll help you schedule regular office appointments for medicine refills. If we must cancel or change your appointment for any reason, we'll make sure you have enough medicine to last until your next appointment.     As a Provider, I will:   Listen carefully to your concerns while treating you with respect.   Recommend a treatment plan that I believe is in your best interest and may involve therapies other than medicine.    Talk with you often about the possible benefits and the risk of harm of any medicine that we prescribe for you.  Assess the safety of this medicine and check how well it works.    Provide a plan on how to taper (discontinue or go off) using this medicine if the decision is made to stop its use.      ::  As a Patient, I understand controlled substances:     Are prescribed by my care provider to help me function or work and manage my condition(s).?  Are strong medicines and can cause serious side effects.     Need to be taken exactly as prescribed.?Combining controlled substances with certain medicines or chemicals (such as illegal drugs, alcohol, sedatives, sleeping pills, and benzodiazepines) can be dangerous or even fatal.? If I stop taking my medicines suddenly, I may have severe withdrawal symptoms.     The risks, benefits, and  side effects of these medicine(s) were explained to me. I agree that:    I will take part in other treatments as advised by my care team. This may be psychiatry or counseling, physical therapy, behavioral therapy, group treatment or a referral to specialist.    I will keep all my appointments and understand this is part of the monitoring of controlled substances.?My care team may require an office visit for EVERY controlled substance refill. If I miss appointments or don t follow instructions, my care team may stop my medicine    I will take my medicines as prescribed. I will not change the dose or schedule unless my care team tells me to. There will be no refills if I run out early.      I may be asked to come to the clinic and complete a urine drug test or complete a pill count. If I don t give a urine sample or participate in a pill count, the care team may stop my medicine.    I will only receive controlled substance prescriptions from this clinic. If I am treated by another provider, I will tell them that I am taking controlled substances and that I have a treatment agreement with this provider. I will inform my [facility] care team within one business day if I am given a prescription for any controlled substance by another healthcare provider. My [facility] care team can contact other providers and pharmacists about my use of any medicines.    It is up to me to make sure that I don't run out of my medicines on weekends or holidays.?If my care team is willing to refill my prescription without a visit, I must request refills only during office hours. Refills may take up to 3 business days to process. I will use one pharmacy to fill all my controlled substance prescriptions. I will notify the clinic about any changes to my insurance or medicine availability.    I am responsible for my prescriptions. If the medicine/prescription is lost, stolen or destroyed, it will not be replaced.?I also agree not  to share controlled substance medicines with anyone.     I am aware I should not use any illegal or recreational drugs. I agree not to drink alcohol unless my care team says I can.     If I enroll in the [place] Medical Cannabis program, I will tell my care team before my next refill.    I will tell my care team right away if I become pregnant, have a new medical problem treated outside of my regular clinic, or have a change in my medicines.     I understand that this medicine can affect my thinking, judgment and reaction time.? Alcohol and drugs affect the brain and body, which can affect the safety of my driving. Being under the influence of alcohol or drugs can affect my decision-making, behaviors, personal safety and the safety of others. Driving while impaired (DWI) can occur if a person is driving, operating or in physical control of a car, motorcycle, boat, snowmobile, ATV, motorbike, off-road vehicle or any other motor vehicle ([place] Statute 169A.20). I understand the risk if I choose to drive or operate any vehicle or machinery.    I understand that if I do not follow any of the conditions above, my prescriptions or treatment may be stopped or changed.   I agree that my provider, clinic care team and pharmacy may work with any city, state or federal law enforcement agency that investigates the misuse, sale or other diversion of my controlled medicine. I will allow my provider to discuss my care with, or share a copy of, this agreement with any other treating provider, pharmacy or emergency room where I receive care.     I have read this agreement and have asked questions about anything I did not understand.    ________________________________________________________  Patient Signature - Rex Bond     ___________________                   Date     ________________________________________________________  Provider Signature - THANIA Prater CNP       ___________________                   Date      ________________________________________________________  Witness Signature (required if provider not present while patient signing)          ___________________                   Date

## 2023-11-12 ENCOUNTER — MYC REFILL (OUTPATIENT)
Dept: FAMILY MEDICINE | Facility: CLINIC | Age: 53
End: 2023-11-12
Payer: COMMERCIAL

## 2023-11-12 DIAGNOSIS — F98.8 ATTENTION DEFICIT DISORDER (ADD) WITHOUT HYPERACTIVITY: ICD-10-CM

## 2023-11-12 RX ORDER — DEXTROAMPHETAMINE SACCHARATE, AMPHETAMINE ASPARTATE, DEXTROAMPHETAMINE SULFATE AND AMPHETAMINE SULFATE 3.75; 3.75; 3.75; 3.75 MG/1; MG/1; MG/1; MG/1
15 TABLET ORAL 2 TIMES DAILY
Qty: 60 TABLET | Refills: 0 | Status: CANCELLED | OUTPATIENT
Start: 2023-11-12

## 2023-12-11 ENCOUNTER — MYC REFILL (OUTPATIENT)
Dept: FAMILY MEDICINE | Facility: CLINIC | Age: 53
End: 2023-12-11
Payer: COMMERCIAL

## 2023-12-11 DIAGNOSIS — F98.8 ATTENTION DEFICIT DISORDER (ADD) WITHOUT HYPERACTIVITY: ICD-10-CM

## 2023-12-11 RX ORDER — DEXTROAMPHETAMINE SACCHARATE, AMPHETAMINE ASPARTATE, DEXTROAMPHETAMINE SULFATE AND AMPHETAMINE SULFATE 3.75; 3.75; 3.75; 3.75 MG/1; MG/1; MG/1; MG/1
15 TABLET ORAL 2 TIMES DAILY
Qty: 60 TABLET | Refills: 0 | Status: SHIPPED | OUTPATIENT
Start: 2023-12-11 | End: 2024-01-10

## 2023-12-11 NOTE — TELEPHONE ENCOUNTER
Medication last filled:     Last clinic visit: 10/24/2023    Clinic visit frequency required: Q 6  months    Next clinic visit: 12/29/2023    Controlled substance agreement on file: Yes:  Date 10/30/2023.    Urine Drug Screen on file:  Yes    Pending Prescriptions:                       Disp   Refills    amphetamine-dextroamphetamine (ADDERALL) *60 tab*0            Sig: Take 1 tablet (15 mg) by mouth 2 times daily        Orbicularis Oris Muscle Flap Text: The defect edges were debeveled with a #15 scalpel blade.  Given that the defect affected the competency of the oral sphincter an obicularis oris muscle flap was deemed most appropriate to restore this competency and normal muscle function.  Using a sterile surgical marker, an appropriate flap was drawn incorporating the defect. The area thus outlined was incised with a #15 scalpel blade.

## 2023-12-28 PROBLEM — R74.02 NONSPECIFIC ELEVATION OF LEVELS OF TRANSAMINASE OR LACTIC ACID DEHYDROGENASE (LDH): Status: ACTIVE | Noted: 2021-07-29

## 2023-12-28 PROBLEM — R74.01 NONSPECIFIC ELEVATION OF LEVELS OF TRANSAMINASE OR LACTIC ACID DEHYDROGENASE (LDH): Status: ACTIVE | Noted: 2021-07-29

## 2023-12-29 ENCOUNTER — OFFICE VISIT (OUTPATIENT)
Dept: FAMILY MEDICINE | Facility: CLINIC | Age: 53
End: 2023-12-29
Attending: NURSE PRACTITIONER
Payer: COMMERCIAL

## 2023-12-29 VITALS
TEMPERATURE: 98.5 F | OXYGEN SATURATION: 98 % | HEART RATE: 73 BPM | DIASTOLIC BLOOD PRESSURE: 83 MMHG | BODY MASS INDEX: 35.87 KG/M2 | SYSTOLIC BLOOD PRESSURE: 123 MMHG | RESPIRATION RATE: 12 BRPM | WEIGHT: 264.5 LBS

## 2023-12-29 DIAGNOSIS — F41.1 GENERALIZED ANXIETY DISORDER: ICD-10-CM

## 2023-12-29 DIAGNOSIS — I10 PRIMARY HYPERTENSION: ICD-10-CM

## 2023-12-29 DIAGNOSIS — K50.90 CROHN'S DISEASE WITHOUT COMPLICATION, UNSPECIFIED GASTROINTESTINAL TRACT LOCATION (H): ICD-10-CM

## 2023-12-29 DIAGNOSIS — F98.8 ATTENTION DEFICIT DISORDER (ADD) WITHOUT HYPERACTIVITY: Primary | ICD-10-CM

## 2023-12-29 DIAGNOSIS — E66.01 MORBID OBESITY (H): ICD-10-CM

## 2023-12-29 PROCEDURE — 99214 OFFICE O/P EST MOD 30 MIN: CPT | Performed by: NURSE PRACTITIONER

## 2023-12-29 ASSESSMENT — PAIN SCALES - GENERAL: PAINLEVEL: NO PAIN (0)

## 2023-12-29 NOTE — PROGRESS NOTES
Assessment and Plan:     Attention deficit disorder (ADD) without hyperactivity  Patient would like to increase dose of Adderall.  Will increase dose to 20 mg twice daily when he is due for his next refill.  He is to follow-up in 1 month to recheck blood pressure.    Crohn's disease without complication, unspecified gastrointestinal tract location (H)  This is controlled.  He denies recent flares.  He is followed by gastroenterology.    Morbid obesity (H)  Recommend consuming a healthy diet and exercising.  This is contributing to hypertension.    Generalized anxiety disorder  He continues sertraline.    Primary hypertension  He continues losartan and hydrochlorothiazide.      Subjective:     Rex is a 53 year old male presenting to the clinic for medication management.  Patient has ADD and is taking Adderall 15 mg twice daily.  He is tolerating medication well.  He feels as though the medication allows him to focus and complete tasks in a timely manner.  He is less irritable and anxious.  Patient would like to increase the dose, though.  He took 20 mg twice daily in the past and has found that this dose works best for him.  He has a history of hypertension and is taking losartan 50 mg daily and hydrochlorothiazide 25 mg daily.  Anxiety is also controlled with sertraline 50 mg daily.  He denies thoughts of suicide.  He feels well supported.  He has a history of Crohn's and denies recent flares.  He is followed by gastroenterology.    Reviewof Systems: A complete 14 point review of systems was obtained and is negative or as stated in the history of present illness.    Social History     Socioeconomic History    Marital status:      Spouse name: Not on file    Number of children: Not on file    Years of education: Not on file    Highest education level: Not on file   Occupational History    Not on file   Tobacco Use    Smoking status: Some Days     Types: Dip, chew, snus or snuff     Passive exposure: Past     Smokeless tobacco: Current     Types: Chew   Vaping Use    Vaping Use: Never used   Substance and Sexual Activity    Alcohol use: Yes    Drug use: Not on file    Sexual activity: Not on file   Other Topics Concern    Not on file   Social History Narrative    Not on file     Social Determinants of Health     Financial Resource Strain: Low Risk  (12/29/2023)    Financial Resource Strain     Within the past 12 months, have you or your family members you live with been unable to get utilities (heat, electricity) when it was really needed?: No   Food Insecurity: Low Risk  (12/29/2023)    Food Insecurity     Within the past 12 months, did you worry that your food would run out before you got money to buy more?: No     Within the past 12 months, did the food you bought just not last and you didn t have money to get more?: No   Transportation Needs: Low Risk  (12/29/2023)    Transportation Needs     Within the past 12 months, has lack of transportation kept you from medical appointments, getting your medicines, non-medical meetings or appointments, work, or from getting things that you need?: No   Physical Activity: Not on file   Stress: Not on file   Social Connections: Not on file   Interpersonal Safety: Low Risk  (10/24/2023)    Interpersonal Safety     Do you feel physically and emotionally safe where you currently live?: Yes     Within the past 12 months, have you been hit, slapped, kicked or otherwise physically hurt by someone?: No     Within the past 12 months, have you been humiliated or emotionally abused in other ways by your partner or ex-partner?: No   Housing Stability: Low Risk  (12/29/2023)    Housing Stability     Do you have housing? : Yes     Are you worried about losing your housing?: No       Active Ambulatory Problems     Diagnosis Date Noted    Restless legs syndrome 09/03/2020    Idiopathic progressive polyneuropathy 09/03/2020    Hypertension 09/03/2020    Hyperlipidemia 03/23/2016    Generalized  anxiety disorder 09/03/2020    Essential tremor 09/03/2020    Bilateral carpal tunnel syndrome 09/03/2020    Attention deficit disorder (ADD) without hyperactivity 09/06/2019    Anxiety 03/23/2016    Chronic pain disorder 10/22/2019    Chewing tobacco use 03/23/2016    Displacement of intervertebral disc, site unspecified, without myelopathy 09/04/2020    Diverticulosis of intestine without bleeding 10/06/2015    Esophageal reflux 09/04/2020    Gout 09/04/2020    Internal hemorrhoids 10/06/2015    Morbid obesity (H) 10/30/2018    Neoplasm of testis 09/04/2020    Regional enteritis (H) 09/04/2020    Nonspecific elevation of levels of transaminase or lactic acid dehydrogenase (LDH) 07/29/2021    Maisha-rectal abscess 04/14/2015     Resolved Ambulatory Problems     Diagnosis Date Noted    Bright Red Blood Per Rectum      Past Medical History:   Diagnosis Date    Arthritis     Crohn disease (H)     Diverticulosis     Herniated disc     HTN (hypertension)     Hypokalemia     Motion sickness     Pancreatic cancer (H)     Pancreatic cancer (H)        Family History   Problem Relation Age of Onset    Hypertension Mother     Thyroid Disease Mother     Diabetes Father     Hypertension Father     Cancer Maternal Grandmother     Cerebrovascular Disease Maternal Grandmother     Cerebrovascular Disease Paternal Grandmother     Heart Disease Paternal Grandfather     Varicose Veins Mother     Coronary Artery Disease Father     Throat cancer Maternal Grandmother         Laryngeal cancer       Objective:     /83   Pulse 73   Temp 98.5  F (36.9  C)   Resp 12   Wt 120 kg (264 lb 8 oz)   SpO2 98%   BMI 35.87 kg/m      Patient is alert, in no obvious distress.   Skin: Warm, dry.    Lungs:  Clear to auscultation. Respirations even and unlabored.  No wheezing or rales noted.   Heart:  Regular rate and rhythm.  No murmurs, S3, S4, gallops, or rubs.    Abdomen: Soft, nontender.  No organomegaly. Bowel sounds normoactive. No  guarding or masses noted.               Answers submitted by the patient for this visit:  Hypertension Visit (Submitted on 12/29/2023)  Chief Complaint: Chronic problems general questions HPI Form  Do you check your blood pressure regularly outside of the clinic?: Yes  Are your blood pressures ever more than 140 on the top number (systolic) OR more than 90 on the bottom number (diastolic)? (For example, greater than 140/90): No  Are you following a low salt diet?: Yes  General Questionnaire (Submitted on 12/29/2023)  Chief Complaint: Chronic problems general questions HPI Form  How many servings of fruits and vegetables do you eat daily?: 2-3  How many minutes a day do you exercise enough to make your heart beat faster?: 60 or more  How many days per week do you miss taking your medication?: 0

## 2024-01-10 DIAGNOSIS — F98.8 ATTENTION DEFICIT DISORDER (ADD) WITHOUT HYPERACTIVITY: Primary | ICD-10-CM

## 2024-01-10 RX ORDER — DEXTROAMPHETAMINE SACCHARATE, AMPHETAMINE ASPARTATE, DEXTROAMPHETAMINE SULFATE AND AMPHETAMINE SULFATE 5; 5; 5; 5 MG/1; MG/1; MG/1; MG/1
20 TABLET ORAL 2 TIMES DAILY
Qty: 60 TABLET | Refills: 0 | Status: SHIPPED | OUTPATIENT
Start: 2024-01-10 | End: 2024-02-05

## 2024-02-05 ENCOUNTER — MYC REFILL (OUTPATIENT)
Dept: FAMILY MEDICINE | Facility: CLINIC | Age: 54
End: 2024-02-05
Payer: COMMERCIAL

## 2024-02-05 DIAGNOSIS — F98.8 ATTENTION DEFICIT DISORDER (ADD) WITHOUT HYPERACTIVITY: ICD-10-CM

## 2024-02-05 RX ORDER — DEXTROAMPHETAMINE SACCHARATE, AMPHETAMINE ASPARTATE, DEXTROAMPHETAMINE SULFATE AND AMPHETAMINE SULFATE 5; 5; 5; 5 MG/1; MG/1; MG/1; MG/1
20 TABLET ORAL 2 TIMES DAILY
Qty: 60 TABLET | Refills: 0 | Status: SHIPPED | OUTPATIENT
Start: 2024-02-06 | End: 2024-03-04

## 2024-03-04 ENCOUNTER — MYC REFILL (OUTPATIENT)
Dept: FAMILY MEDICINE | Facility: CLINIC | Age: 54
End: 2024-03-04
Payer: COMMERCIAL

## 2024-03-04 DIAGNOSIS — F98.8 ATTENTION DEFICIT DISORDER (ADD) WITHOUT HYPERACTIVITY: ICD-10-CM

## 2024-03-04 RX ORDER — DEXTROAMPHETAMINE SACCHARATE, AMPHETAMINE ASPARTATE, DEXTROAMPHETAMINE SULFATE AND AMPHETAMINE SULFATE 5; 5; 5; 5 MG/1; MG/1; MG/1; MG/1
20 TABLET ORAL 2 TIMES DAILY
Qty: 60 TABLET | Refills: 0 | Status: SHIPPED | OUTPATIENT
Start: 2024-03-04 | End: 2024-04-01

## 2024-03-20 ENCOUNTER — ALLIED HEALTH/NURSE VISIT (OUTPATIENT)
Dept: FAMILY MEDICINE | Facility: CLINIC | Age: 54
End: 2024-03-20
Payer: COMMERCIAL

## 2024-03-20 VITALS
SYSTOLIC BLOOD PRESSURE: 120 MMHG | RESPIRATION RATE: 18 BRPM | TEMPERATURE: 97.6 F | OXYGEN SATURATION: 99 % | HEART RATE: 98 BPM | DIASTOLIC BLOOD PRESSURE: 80 MMHG

## 2024-03-20 DIAGNOSIS — I10 PRIMARY HYPERTENSION: Primary | ICD-10-CM

## 2024-03-20 PROCEDURE — 99207 PR NO CHARGE NURSE ONLY: CPT

## 2024-03-20 ASSESSMENT — PAIN SCALES - GENERAL: PAINLEVEL: NO PAIN (0)

## 2024-03-20 NOTE — PROGRESS NOTES
I met with Rex Bond at the request of Anisa Bush to recheck his blood pressure.  Blood pressure medications on the med list were reviewed with patient.    Patient has taken all medications as per usual regimen: Yes  Patient reports tolerating them without any issues or concerns: Yes    Vitals:    03/20/24 1444   BP: 120/80   Pulse: 98   Resp: 18   Temp: 97.6  F (36.4  C)   SpO2: 99%       Blood pressure was taken, previous encounter was reviewed, recorded blood pressure below 140/90.  Patient was discharged and the note will be sent to the provider for final review.

## 2024-04-01 ENCOUNTER — MYC REFILL (OUTPATIENT)
Dept: FAMILY MEDICINE | Facility: CLINIC | Age: 54
End: 2024-04-01
Payer: COMMERCIAL

## 2024-04-01 DIAGNOSIS — F98.8 ATTENTION DEFICIT DISORDER (ADD) WITHOUT HYPERACTIVITY: ICD-10-CM

## 2024-04-01 RX ORDER — DEXTROAMPHETAMINE SACCHARATE, AMPHETAMINE ASPARTATE, DEXTROAMPHETAMINE SULFATE AND AMPHETAMINE SULFATE 5; 5; 5; 5 MG/1; MG/1; MG/1; MG/1
20 TABLET ORAL 2 TIMES DAILY
Qty: 60 TABLET | Refills: 0 | Status: SHIPPED | OUTPATIENT
Start: 2024-04-01 | End: 2024-04-27

## 2024-04-27 ENCOUNTER — MYC REFILL (OUTPATIENT)
Dept: FAMILY MEDICINE | Facility: CLINIC | Age: 54
End: 2024-04-27
Payer: COMMERCIAL

## 2024-04-27 DIAGNOSIS — F98.8 ATTENTION DEFICIT DISORDER (ADD) WITHOUT HYPERACTIVITY: ICD-10-CM

## 2024-04-30 RX ORDER — DEXTROAMPHETAMINE SACCHARATE, AMPHETAMINE ASPARTATE, DEXTROAMPHETAMINE SULFATE AND AMPHETAMINE SULFATE 5; 5; 5; 5 MG/1; MG/1; MG/1; MG/1
20 TABLET ORAL 2 TIMES DAILY
Qty: 60 TABLET | Refills: 0 | Status: SHIPPED | OUTPATIENT
Start: 2024-04-30 | End: 2024-05-27

## 2024-04-30 NOTE — TELEPHONE ENCOUNTER
Medication last filled:     Last clinic visit: 12/29/2023    Clinic visit frequency required: Q 6  months    Next clinic visit: N/A    Controlled substance agreement on file: Yes:  Date 10/30/2023.    Urine Drug Screen on file:  Yes    Pending Prescriptions:                       Disp   Refills    amphetamine-dextroamphetamine (ADDERALL) *60 tab*0            Sig: Take 1 tablet (20 mg) by mouth 2 times daily

## 2024-05-27 ENCOUNTER — MYC REFILL (OUTPATIENT)
Dept: FAMILY MEDICINE | Facility: CLINIC | Age: 54
End: 2024-05-27
Payer: COMMERCIAL

## 2024-05-27 DIAGNOSIS — F98.8 ATTENTION DEFICIT DISORDER (ADD) WITHOUT HYPERACTIVITY: ICD-10-CM

## 2024-05-29 RX ORDER — DEXTROAMPHETAMINE SACCHARATE, AMPHETAMINE ASPARTATE, DEXTROAMPHETAMINE SULFATE AND AMPHETAMINE SULFATE 5; 5; 5; 5 MG/1; MG/1; MG/1; MG/1
20 TABLET ORAL 2 TIMES DAILY
Qty: 60 TABLET | Refills: 0 | Status: SHIPPED | OUTPATIENT
Start: 2024-05-29 | End: 2024-06-24

## 2024-06-24 ENCOUNTER — MYC REFILL (OUTPATIENT)
Dept: FAMILY MEDICINE | Facility: CLINIC | Age: 54
End: 2024-06-24
Payer: COMMERCIAL

## 2024-06-24 DIAGNOSIS — F98.8 ATTENTION DEFICIT DISORDER (ADD) WITHOUT HYPERACTIVITY: ICD-10-CM

## 2024-06-24 RX ORDER — DEXTROAMPHETAMINE SACCHARATE, AMPHETAMINE ASPARTATE, DEXTROAMPHETAMINE SULFATE AND AMPHETAMINE SULFATE 5; 5; 5; 5 MG/1; MG/1; MG/1; MG/1
20 TABLET ORAL 2 TIMES DAILY
Qty: 60 TABLET | Refills: 0 | Status: SHIPPED | OUTPATIENT
Start: 2024-06-24 | End: 2024-07-26

## 2024-07-14 ENCOUNTER — HEALTH MAINTENANCE LETTER (OUTPATIENT)
Age: 54
End: 2024-07-14

## 2024-07-26 ENCOUNTER — OFFICE VISIT (OUTPATIENT)
Dept: FAMILY MEDICINE | Facility: CLINIC | Age: 54
End: 2024-07-26
Payer: COMMERCIAL

## 2024-07-26 VITALS
HEIGHT: 72 IN | SYSTOLIC BLOOD PRESSURE: 120 MMHG | TEMPERATURE: 97.3 F | DIASTOLIC BLOOD PRESSURE: 83 MMHG | HEART RATE: 96 BPM | OXYGEN SATURATION: 98 % | BODY MASS INDEX: 34.54 KG/M2 | RESPIRATION RATE: 16 BRPM | WEIGHT: 255 LBS

## 2024-07-26 DIAGNOSIS — F98.8 ATTENTION DEFICIT DISORDER (ADD) WITHOUT HYPERACTIVITY: Primary | ICD-10-CM

## 2024-07-26 DIAGNOSIS — I10 PRIMARY HYPERTENSION: ICD-10-CM

## 2024-07-26 PROCEDURE — 99214 OFFICE O/P EST MOD 30 MIN: CPT | Performed by: NURSE PRACTITIONER

## 2024-07-26 RX ORDER — DEXTROAMPHETAMINE SACCHARATE, AMPHETAMINE ASPARTATE, DEXTROAMPHETAMINE SULFATE AND AMPHETAMINE SULFATE 5; 5; 5; 5 MG/1; MG/1; MG/1; MG/1
20 TABLET ORAL 2 TIMES DAILY
Qty: 60 TABLET | Refills: 0 | Status: CANCELLED | OUTPATIENT
Start: 2024-07-26

## 2024-07-26 RX ORDER — DEXTROAMPHETAMINE SACCHARATE, AMPHETAMINE ASPARTATE, DEXTROAMPHETAMINE SULFATE AND AMPHETAMINE SULFATE 5; 5; 5; 5 MG/1; MG/1; MG/1; MG/1
20 TABLET ORAL 3 TIMES DAILY
Qty: 90 TABLET | Refills: 0 | Status: SHIPPED | OUTPATIENT
Start: 2024-07-26 | End: 2024-08-19

## 2024-07-26 ASSESSMENT — PAIN SCALES - GENERAL: PAINLEVEL: MILD PAIN (3)

## 2024-07-26 NOTE — PROGRESS NOTES
Assessment and Plan:     Attention deficit disorder (ADD) without hyperactivity  Primary hypertension  This is uncontrolled.  Discussed treatment options.  Will increase Adderall to 20 mg 3 times daily.  Educated on indications and side effects.  He is up-to-date on controlled substance agreement and urine drug screen.  I reviewed the PDMP and there are no concerns today.  Will monitor blood pressure closely.  He continues losartan and hydrochlorothiazide.  Patient will follow-up for fasting physical 1 month.  He is content with the plan.  - amphetamine-dextroamphetamine (ADDERALL) 20 MG tablet  Dispense: 90 tablet; Refill: 0        Subjective:     Rex is a 54 year old male presenting to the clinic for medication management.  Patient has ADD and is taking Adderall 20 mg twice daily.  He is interested in increasing the dose.  He feels as though he is not focusing well midday.  He feels overwhelmed easily and is irritable.  He typically takes his first dose around 5:30 AM will take a second dose around noon.  He is sleeping well at night.  He denies any side effects of the medication.  He does have hypertension which we are monitoring closely.  This is controlled with losartan 50 mg daily and hydrochlorothiazide 25 mg daily.    Reviewof Systems: A complete 14 point review of systems was obtained and is negative or as stated in the history of present illness.    Social History     Socioeconomic History    Marital status:      Spouse name: Not on file    Number of children: Not on file    Years of education: Not on file    Highest education level: Not on file   Occupational History    Not on file   Tobacco Use    Smoking status: Some Days     Types: Dip, chew, snus or snuff     Passive exposure: Past    Smokeless tobacco: Current     Types: Chew   Vaping Use    Vaping status: Never Used   Substance and Sexual Activity    Alcohol use: Yes    Drug use: Not on file    Sexual activity: Not on file   Other Topics  Concern    Not on file   Social History Narrative    Not on file     Social Determinants of Health     Financial Resource Strain: Low Risk  (12/29/2023)    Financial Resource Strain     Within the past 12 months, have you or your family members you live with been unable to get utilities (heat, electricity) when it was really needed?: No   Food Insecurity: Low Risk  (12/29/2023)    Food Insecurity     Within the past 12 months, did you worry that your food would run out before you got money to buy more?: No     Within the past 12 months, did the food you bought just not last and you didn t have money to get more?: No   Transportation Needs: Low Risk  (12/29/2023)    Transportation Needs     Within the past 12 months, has lack of transportation kept you from medical appointments, getting your medicines, non-medical meetings or appointments, work, or from getting things that you need?: No   Physical Activity: Not on file   Stress: Not on file   Social Connections: Not on file   Interpersonal Safety: Low Risk  (7/26/2024)    Interpersonal Safety     Do you feel physically and emotionally safe where you currently live?: Yes     Within the past 12 months, have you been hit, slapped, kicked or otherwise physically hurt by someone?: No     Within the past 12 months, have you been humiliated or emotionally abused in other ways by your partner or ex-partner?: No   Housing Stability: Low Risk  (12/29/2023)    Housing Stability     Do you have housing? : Yes     Are you worried about losing your housing?: No       Active Ambulatory Problems     Diagnosis Date Noted    Restless legs syndrome 09/03/2020    Idiopathic progressive polyneuropathy 09/03/2020    Hypertension 09/03/2020    Hyperlipidemia 03/23/2016    Generalized anxiety disorder 09/03/2020    Essential tremor 09/03/2020    Bilateral carpal tunnel syndrome 09/03/2020    Attention deficit disorder (ADD) without hyperactivity 09/06/2019    Anxiety 03/23/2016    Chronic  pain disorder 10/22/2019    Chewing tobacco use 03/23/2016    Displacement of intervertebral disc, site unspecified, without myelopathy 09/04/2020    Diverticulosis of intestine without bleeding 10/06/2015    Esophageal reflux 09/04/2020    Gout 09/04/2020    Internal hemorrhoids 10/06/2015    Morbid obesity (H) 10/30/2018    Neoplasm of testis 09/04/2020    Regional enteritis (H) 09/04/2020    Nonspecific elevation of levels of transaminase or lactic acid dehydrogenase (LDH) 07/29/2021    Maisha-rectal abscess 04/14/2015     Resolved Ambulatory Problems     Diagnosis Date Noted    Bright Red Blood Per Rectum      Past Medical History:   Diagnosis Date    Arthritis     Crohn disease (H)     Diverticulosis     Herniated disc     HTN (hypertension)     Hypokalemia     Motion sickness     Pancreatic cancer (H)     Pancreatic cancer (H)        Family History   Problem Relation Age of Onset    Hypertension Mother     Thyroid Disease Mother     Diabetes Father     Hypertension Father     Cancer Maternal Grandmother     Cerebrovascular Disease Maternal Grandmother     Cerebrovascular Disease Paternal Grandmother     Heart Disease Paternal Grandfather     Varicose Veins Mother     Coronary Artery Disease Father     Throat cancer Maternal Grandmother         Laryngeal cancer       Objective:     /83   Pulse 96   Temp 97.3  F (36.3  C)   Resp 16   Ht 1.829 m (6')   Wt 115.7 kg (255 lb)   SpO2 98%   BMI 34.58 kg/m      Patient is alert, in no obvious distress.   Skin: Warm, dry.   Lungs:  Clear to auscultation. Respirations even and unlabored.  No wheezing or rales noted.   Heart:  Regular rate and rhythm.  No murmurs, S3, S4, gallops, or rubs.        Answers submitted by the patient for this visit:  Hypertension Visit (Submitted on 7/26/2024)  Chief Complaint: Chronic problems general questions HPI Form  Do you check your blood pressure regularly outside of the clinic?: Yes  Are your blood pressures ever more than  140 on the top number (systolic) OR more than 90 on the bottom number (diastolic)? (For example, greater than 140/90): No  Are you following a low salt diet?: No  General Questionnaire (Submitted on 7/26/2024)  Chief Complaint: Chronic problems general questions HPI Form  How many servings of fruits and vegetables do you eat daily?: 2-3  On average, how many sweetened beverages do you drink each day (Examples: soda, juice, sweet tea, etc.  Do NOT count diet or artificially sweetened beverages)?: 2  How many minutes a day do you exercise enough to make your heart beat faster?: 60 or more  How many days a week do you exercise enough to make your heart beat faster?: 5  How many days per week do you miss taking your medication?: 0

## 2024-08-12 ENCOUNTER — TELEPHONE (OUTPATIENT)
Dept: FAMILY MEDICINE | Facility: CLINIC | Age: 54
End: 2024-08-12

## 2024-08-12 NOTE — TELEPHONE ENCOUNTER
Forms/Letter Request     Type of form/letter: OTHER:  amphetamine-dextroamphetamine (ADDERALL) 20 MG tablet  form from insurance     Do we have the form/letter: Yes: patient states insurance faxed in form regarding medication and would like to make sure that clinic received it     Who is the form from? [employer] (if other please explain)     Where did/will the form come from? form was faxed in     When is form/letter needed by: asap     How would you like the form/letter returned:  see form     Patient Notified form requests are processed in 5-7 business days:Yes     Could we send this information to you in [employer] or would you prefer to receive a phone call?:   Patient would prefer a phone call. Please call once form has been received,  Okay to leave a detailed message?: Yes at Cell number on file:        Telephone Information:   Mobile [phone]

## 2024-08-19 ENCOUNTER — MYC REFILL (OUTPATIENT)
Dept: FAMILY MEDICINE | Facility: CLINIC | Age: 54
End: 2024-08-19
Payer: COMMERCIAL

## 2024-08-19 DIAGNOSIS — F98.8 ATTENTION DEFICIT DISORDER (ADD) WITHOUT HYPERACTIVITY: ICD-10-CM

## 2024-08-19 NOTE — TELEPHONE ENCOUNTER
FYI - Status Update    Who is Calling: Donato    Update: Donato calling to check status of form

## 2024-08-20 RX ORDER — DEXTROAMPHETAMINE SACCHARATE, AMPHETAMINE ASPARTATE, DEXTROAMPHETAMINE SULFATE AND AMPHETAMINE SULFATE 5; 5; 5; 5 MG/1; MG/1; MG/1; MG/1
20 TABLET ORAL 3 TIMES DAILY
Qty: 90 TABLET | Refills: 0 | Status: SHIPPED | OUTPATIENT
Start: 2024-08-20 | End: 2024-09-16

## 2024-08-20 NOTE — TELEPHONE ENCOUNTER
Form was faxed to [facility] Card Fax # [phone] and Fax # [phone].    Sita Mao MA

## 2024-08-26 ENCOUNTER — TELEPHONE (OUTPATIENT)
Dept: FAMILY MEDICINE | Facility: CLINIC | Age: 54
End: 2024-08-26
Payer: COMMERCIAL

## 2024-08-26 NOTE — TELEPHONE ENCOUNTER
Retail Pharmacy Prior Authorization Team   Phone: [phone]      Prior Authorization Approval    Medication: AMPHETAMINE-DEXTROAMPHETAMINE 20 MG PO TABS  Authorization Effective Date: 8/26/2024  Authorization Expiration Date: 8/26/2027  Approved Dose/Quantity: 90 per 30 days  Reference #:     Insurance Company: [employer] - Phone [phone] Fax [phone]  Expected CoPay: $    CoPay Card Available: No    Financial Assistance Needed: n/a   Which Pharm[facility] is filling the prescription[id number] [address] AT [place] [address]  Pharmacy Notified: Yes, approval letter faxed  Patient Notified: **Instructed pharmacy to notify patient when script is ready to /ship.**

## 2024-08-26 NOTE — TELEPHONE ENCOUNTER
Retail Pharmacy Prior Authorization Team   Phone: [phone]      PA Initiation    Medication: AMPHETAMINE-DEXTROAMPHETAMINE 20 MG PO TABS  Insurance Company: [employer] - Phone [phone] Fax [phone]  Pharmacy Filling the Rx:    Filling Pharmacy Phone:    Filling Pharmacy Fax:    Start Date: 8/26/2024

## 2024-08-27 ENCOUNTER — TELEPHONE (OUTPATIENT)
Dept: FAMILY MEDICINE | Facility: CLINIC | Age: 54
End: 2024-08-27
Payer: COMMERCIAL

## 2024-08-27 NOTE — TELEPHONE ENCOUNTER
Patient called into the clinic to state that he was seen at the [facility] in [place] yesterday, 8/26/24, regarding HLD, HTN, and screening for DM.    See office visit notes from THANIA Taylor, CNP, yesterday, 8/26/24, through Care Everywhere.    Patient had a BMP, lipid panel, and Hgb A1C drawn yesterday and was prescribed losartan and hydrochlorothiazide for HTN.    Patient has an appointment with the PCP tomorrow, 8/28/24, for a preventative visit and is wondering if he still needs to keep the appointment with the PCP for tomorrow if he was seen yesterday [facility].    Writer will route the above information to the PCP to review and advise next steps.    Tracey Norton RN, BSN  [facility]

## 2024-08-27 NOTE — TELEPHONE ENCOUNTER
See PCP's recommendations for the patient, on 8/27/24, regarding the patient's appointment for tomorrow, 8/28/24.    Writer called the patient and left a detailed message with the above PCP's recommendations.    If the patient calls back, please relay to him the above PCP's message.    Please route to the RN queue for any questions or concerns.    Tracey Norton RN, BSN  [facility]

## 2024-08-28 ENCOUNTER — OFFICE VISIT (OUTPATIENT)
Dept: FAMILY MEDICINE | Facility: CLINIC | Age: 54
End: 2024-08-28
Payer: COMMERCIAL

## 2024-08-28 VITALS
TEMPERATURE: 97.6 F | OXYGEN SATURATION: 92 % | DIASTOLIC BLOOD PRESSURE: 82 MMHG | RESPIRATION RATE: 17 BRPM | WEIGHT: 247 LBS | SYSTOLIC BLOOD PRESSURE: 132 MMHG | HEIGHT: 72 IN | HEART RATE: 92 BPM | BODY MASS INDEX: 33.46 KG/M2

## 2024-08-28 DIAGNOSIS — I10 PRIMARY HYPERTENSION: ICD-10-CM

## 2024-08-28 DIAGNOSIS — Z79.899 MEDICATION MANAGEMENT: ICD-10-CM

## 2024-08-28 DIAGNOSIS — Z86.018 HISTORY OF BENIGN SCHWANNOMA: ICD-10-CM

## 2024-08-28 DIAGNOSIS — E78.2 MIXED HYPERLIPIDEMIA: ICD-10-CM

## 2024-08-28 DIAGNOSIS — E66.811 CLASS 1 OBESITY WITH SERIOUS COMORBIDITY AND BODY MASS INDEX (BMI) OF 33.0 TO 33.9 IN ADULT, UNSPECIFIED OBESITY TYPE: ICD-10-CM

## 2024-08-28 DIAGNOSIS — F41.1 GENERALIZED ANXIETY DISORDER: ICD-10-CM

## 2024-08-28 DIAGNOSIS — F98.8 ATTENTION DEFICIT DISORDER (ADD) WITHOUT HYPERACTIVITY: ICD-10-CM

## 2024-08-28 DIAGNOSIS — Z12.5 PROSTATE CANCER SCREENING: ICD-10-CM

## 2024-08-28 DIAGNOSIS — Z00.00 ENCOUNTER FOR ROUTINE HISTORY AND PHYSICAL EXAM FOR MALE: Primary | ICD-10-CM

## 2024-08-28 DIAGNOSIS — K50.90 CROHN'S DISEASE WITHOUT COMPLICATION, UNSPECIFIED GASTROINTESTINAL TRACT LOCATION (H): ICD-10-CM

## 2024-08-28 PROBLEM — D49.59: Status: RESOLVED | Noted: 2020-09-04 | Resolved: 2024-08-28

## 2024-08-28 PROBLEM — E66.01 MORBID OBESITY (H): Status: RESOLVED | Noted: 2018-10-30 | Resolved: 2024-08-28

## 2024-08-28 LAB
ALBUMIN UR-MCNC: NEGATIVE MG/DL
AMPHETAMINES UR QL SCN: ABNORMAL
APPEARANCE UR: CLEAR
BARBITURATES UR QL SCN: ABNORMAL
BENZODIAZ UR QL SCN: ABNORMAL
BILIRUB UR QL STRIP: NEGATIVE
BZE UR QL SCN: ABNORMAL
CANNABINOIDS UR QL SCN: ABNORMAL
COLOR UR AUTO: YELLOW
ERYTHROCYTE [DISTWIDTH] IN BLOOD BY AUTOMATED COUNT: 12.6 % (ref 10–15)
FENTANYL UR QL: ABNORMAL
GLUCOSE UR STRIP-MCNC: NEGATIVE MG/DL
HCT VFR BLD AUTO: 47.5 % (ref 40–53)
HGB BLD-MCNC: 16 G/DL (ref 13.3–17.7)
HGB UR QL STRIP: NEGATIVE
KETONES UR STRIP-MCNC: NEGATIVE MG/DL
LEUKOCYTE ESTERASE UR QL STRIP: NEGATIVE
MCH RBC QN AUTO: 29.9 PG (ref 26.5–33)
MCHC RBC AUTO-ENTMCNC: 33.7 G/DL (ref 31.5–36.5)
MCV RBC AUTO: 89 FL (ref 78–100)
NITRATE UR QL: NEGATIVE
OPIATES UR QL SCN: ABNORMAL
PCP QUAL URINE (ROCHE): ABNORMAL
PH UR STRIP: 5.5 [PH] (ref 5–8)
PLATELET # BLD AUTO: 223 10E3/UL (ref 150–450)
RBC # BLD AUTO: 5.35 10E6/UL (ref 4.4–5.9)
SP GR UR STRIP: 1.02 (ref 1–1.03)
UROBILINOGEN UR STRIP-ACNC: 0.2 E.U./DL
WBC # BLD AUTO: 10 10E3/UL (ref 4–11)

## 2024-08-28 PROCEDURE — 84443 ASSAY THYROID STIM HORMONE: CPT | Performed by: NURSE PRACTITIONER

## 2024-08-28 PROCEDURE — 85027 COMPLETE CBC AUTOMATED: CPT | Performed by: NURSE PRACTITIONER

## 2024-08-28 PROCEDURE — 99396 PREV VISIT EST AGE 40-64: CPT | Mod: 25 | Performed by: NURSE PRACTITIONER

## 2024-08-28 PROCEDURE — 99214 OFFICE O/P EST MOD 30 MIN: CPT | Mod: 25 | Performed by: NURSE PRACTITIONER

## 2024-08-28 PROCEDURE — 90471 IMMUNIZATION ADMIN: CPT | Performed by: NURSE PRACTITIONER

## 2024-08-28 PROCEDURE — 90677 PCV20 VACCINE IM: CPT | Performed by: NURSE PRACTITIONER

## 2024-08-28 PROCEDURE — 83690 ASSAY OF LIPASE: CPT | Performed by: NURSE PRACTITIONER

## 2024-08-28 PROCEDURE — 36415 COLL VENOUS BLD VENIPUNCTURE: CPT | Performed by: NURSE PRACTITIONER

## 2024-08-28 PROCEDURE — 80307 DRUG TEST PRSMV CHEM ANLYZR: CPT | Performed by: NURSE PRACTITIONER

## 2024-08-28 PROCEDURE — 80076 HEPATIC FUNCTION PANEL: CPT | Performed by: NURSE PRACTITIONER

## 2024-08-28 PROCEDURE — G0103 PSA SCREENING: HCPCS | Performed by: NURSE PRACTITIONER

## 2024-08-28 SDOH — HEALTH STABILITY: PHYSICAL HEALTH: ON AVERAGE, HOW MANY DAYS PER WEEK DO YOU ENGAGE IN MODERATE TO STRENUOUS EXERCISE (LIKE A BRISK WALK)?: 5 DAYS

## 2024-08-28 SDOH — HEALTH STABILITY: PHYSICAL HEALTH: ON AVERAGE, HOW MANY MINUTES DO YOU ENGAGE IN EXERCISE AT THIS LEVEL?: 40 MIN

## 2024-08-28 ASSESSMENT — ANXIETY QUESTIONNAIRES
GAD7 TOTAL SCORE: 7
7. FEELING AFRAID AS IF SOMETHING AWFUL MIGHT HAPPEN: MORE THAN HALF THE DAYS
3. WORRYING TOO MUCH ABOUT DIFFERENT THINGS: SEVERAL DAYS
GAD7 TOTAL SCORE: 7
1. FEELING NERVOUS, ANXIOUS, OR ON EDGE: NOT AT ALL
5. BEING SO RESTLESS THAT IT IS HARD TO SIT STILL: SEVERAL DAYS
2. NOT BEING ABLE TO STOP OR CONTROL WORRYING: NOT AT ALL
IF YOU CHECKED OFF ANY PROBLEMS ON THIS QUESTIONNAIRE, HOW DIFFICULT HAVE THESE PROBLEMS MADE IT FOR YOU TO DO YOUR WORK, TAKE CARE OF THINGS AT HOME, OR GET ALONG WITH OTHER PEOPLE: NOT DIFFICULT AT ALL
6. BECOMING EASILY ANNOYED OR IRRITABLE: SEVERAL DAYS

## 2024-08-28 ASSESSMENT — PATIENT HEALTH QUESTIONNAIRE - PHQ9
SUM OF ALL RESPONSES TO PHQ QUESTIONS 1-9: 2
5. POOR APPETITE OR OVEREATING: MORE THAN HALF THE DAYS

## 2024-08-28 ASSESSMENT — PAIN SCALES - GENERAL: PAINLEVEL: NO PAIN (0)

## 2024-08-28 ASSESSMENT — SOCIAL DETERMINANTS OF HEALTH (SDOH): HOW OFTEN DO YOU GET TOGETHER WITH FRIENDS OR RELATIVES?: TWICE A WEEK

## 2024-08-28 NOTE — LETTER
[facility]  08/28/24  Patient: Rex Bond  YOB: 1970  Medical Record Number: [id number]                                                                                  Non-Opioid Controlled Substance Agreement    This is an agreement between you and your provider regarding safe and appropriate use of controlled substances prescribed by your care team. Controlled substances are?medicines that can cause physical and mental dependence (abuse).     There are strict laws about having and using these medicines. We here at [facility] are  committed to working with you in your efforts to get better. To support you in this work, we'll help you schedule regular office appointments for medicine refills. If we must cancel or change your appointment for any reason, we'll make sure you have enough medicine to last until your next appointment.     As a Provider, I will:   Listen carefully to your concerns while treating you with respect.   Recommend a treatment plan that I believe is in your best interest and may involve therapies other than medicine.    Talk with you often about the possible benefits and the risk of harm of any medicine that we prescribe for you.  Assess the safety of this medicine and check how well it works.    Provide a plan on how to taper (discontinue or go off) using this medicine if the decision is made to stop its use.      ::  As a Patient, I understand controlled substances:     Are prescribed by my care provider to help me function or work and manage my condition(s).?  Are strong medicines and can cause serious side effects.     Need to be taken exactly as prescribed.?Combining controlled substances with certain medicines or chemicals (such as illegal drugs, alcohol, sedatives, sleeping pills, and benzodiazepines) can be dangerous or even fatal.? If I stop taking my medicines suddenly, I may have severe withdrawal symptoms.     The risks, benefits, and  side effects of these medicine(s) were explained to me. I agree that:    I will take part in other treatments as advised by my care team. This may be psychiatry or counseling, physical therapy, behavioral therapy, group treatment or a referral to specialist.    I will keep all my appointments and understand this is part of the monitoring of controlled substances.?My care team may require an office visit for EVERY controlled substance refill. If I miss appointments or don t follow instructions, my care team may stop my medicine    I will take my medicines as prescribed. I will not change the dose or schedule unless my care team tells me to. There will be no refills if I run out early.      I may be asked to come to the clinic and complete a urine drug test or complete a pill count. If I don t give a urine sample or participate in a pill count, the care team may stop my medicine.    I will only receive controlled substance prescriptions from this clinic. If I am treated by another provider, I will tell them that I am taking controlled substances and that I have a treatment agreement with this provider. I will inform my [facility] care team within one business day if I am given a prescription for any controlled substance by another healthcare provider. My [facility] care team can contact other providers and pharmacists about my use of any medicines.    It is up to me to make sure that I don't run out of my medicines on weekends or holidays.?If my care team is willing to refill my prescription without a visit, I must request refills only during office hours. Refills may take up to 3 business days to process. I will use one pharmacy to fill all my controlled substance prescriptions. I will notify the clinic about any changes to my insurance or medicine availability.    I am responsible for my prescriptions. If the medicine/prescription is lost, stolen or destroyed, it will not be replaced.?I also agree not  to share controlled substance medicines with anyone.     I am aware I should not use any illegal or recreational drugs. I agree not to drink alcohol unless my care team says I can.     If I enroll in the [place] Medical Cannabis program, I will tell my care team before my next refill.    I will tell my care team right away if I become pregnant, have a new medical problem treated outside of my regular clinic, or have a change in my medicines.     I understand that this medicine can affect my thinking, judgment and reaction time.? Alcohol and drugs affect the brain and body, which can affect the safety of my driving. Being under the influence of alcohol or drugs can affect my decision-making, behaviors, personal safety and the safety of others. Driving while impaired (DWI) can occur if a person is driving, operating or in physical control of a car, motorcycle, boat, snowmobile, ATV, motorbike, off-road vehicle or any other motor vehicle ([place] Statute 169A.20). I understand the risk if I choose to drive or operate any vehicle or machinery.    I understand that if I do not follow any of the conditions above, my prescriptions or treatment may be stopped or changed.   I agree that my provider, clinic care team and pharmacy may work with any city, state or federal law enforcement agency that investigates the misuse, sale or other diversion of my controlled medicine. I will allow my provider to discuss my care with, or share a copy of, this agreement with any other treating provider, pharmacy or emergency room where I receive care.     I have read this agreement and have asked questions about anything I did not understand.    ________________________________________________________  Patient Signature - Rex Bond     ___________________                   Date     ________________________________________________________  Provider Signature - THANIA Prater CNP       ___________________                   Date      ________________________________________________________  Witness Signature (required if provider not present while patient signing)          ___________________                   Date

## 2024-08-28 NOTE — PROGRESS NOTES
Assessment and Plan:     Encounter for routine history and physical exam for male  Recommend consuming a healthy diet and exercising.  Pneumococcal vaccine provided.  He has received the hepatitis B vaccine in the past.  He he denies shingles vaccine.  Discussed chewing tobacco treatment options, but he declines.  - CBC with platelets  - TSH with free T4 reflex  - UA Macroscopic with reflex to Microscopic and Culture  - CBC with platelets  - TSH with free T4 reflex  - UA Macroscopic with reflex to Microscopic and Culture    Prostate cancer screening  - Prostate Specific Antigen Screen  - Prostate Specific Antigen Screen    Attention deficit disorder (ADD) without hyperactivity  This is controlled.  Obtain new controlled substance agreement and urine drug screen.  I reviewed the PDMP and there are no concerns today.  He continues Adderall as prescribed.    Mixed hyperlipidemia  He is not currently taking medication.  Most recent labs showed a mildly elevated triglyceride and he was not fasting.    Generalized anxiety disorder  This is controlled with sertraline.    Primary hypertension  This is controlled with losartan and hydrochlorothiazide.    History of benign schwannoma  Will check lipase.  Encouraged follow-up with [facility] as recommended.  - Lipase  - Lipase    Crohn's disease without complication, unspecified gastrointestinal tract location (H)  He denies recent flares.  He is followed by gastroenterology.    Class 1 obesity with serious comorbidity and body mass index (BMI) of 33.0 to 33.9 in adult, unspecified obesity type  Recommend consuming a healthy diet and exercising.  This is contributing to hyperlipidemia and hypertension.    Medication management  - Hepatic panel (Albumin, ALT, AST, Bili, Alk Phos, TP)  - Urine Drug Screen  - Hepatic panel (Albumin, ALT, AST, Bili, Alk Phos, TP)  - Urine Drug Screen        Subjective:     Rex is a 54 year old male presenting to the clinic for a male physical.   Patient has been  for 31 years.  He has 2 daughters.  He is consuming a healthy diet.  He walks his dog 2 miles daily.  He also performs a physical job.  He has hypertension which is treated by an occupational health clinic.  He is taking hydrochlorothiazide 25 mg daily and losartan 50 mg daily.  He monitors his blood pressure at home and it has been ranging 120s/80s.  He ran out of medication 8 days ago and resumed it yesterday.  Patient had labs performed at this clinic on 8/26/2024 showing an A1c of 5.7%.  Total cholesterol is 170, triglycerides 196, HDL 47, LDL 84.  He denies headaches, blurry vision, chest pain, shortness of breath with exertion, edema, orthopnea, syncope.  He consumes 15 beers per week.  His father had diabetes.  He drinks an energy drink once daily.  He has a history of a schwannoma which was diagnosed and treated at [place].  He was told that he does not need to follow-up unless he has symptoms.  Patient has ADD and is taking Adderall 20 mg 3 times daily.  He is tolerating the medication well without any side effects.  He feels that the medication allows him to focus and complete tasks in a timely manner.  He has a Crohn's and is not currently taking medication.  He denies any recent flares.  He is taking sertraline 50 mg daily for anxiety which is well-controlled.    Reviewof Systems: A complete 14 point review of systems was obtained and is negative or as stated in the history of present illness.    Social History     Socioeconomic History    Marital status:      Spouse name: Not on file    Number of children: Not on file    Years of education: Not on file    Highest education level: Not on file   Occupational History    Not on file   Tobacco Use    Smoking status: Some Days     Types: Dip, chew, snus or snuff     Passive exposure: Past    Smokeless tobacco: Current     Types: Chew   Vaping Use    Vaping status: Never Used   Substance and Sexual Activity    Alcohol use: Yes     Drug use: Not on file    Sexual activity: Not on file   Other Topics Concern    Not on file   Social History Narrative    Not on file     Social Determinants of Health     Financial Resource Strain: Low Risk  (8/28/2024)    Financial Resource Strain     Within the past 12 months, have you or your family members you live with been unable to get utilities (heat, electricity) when it was really needed?: No   Food Insecurity: Low Risk  (8/28/2024)    Food Insecurity     Within the past 12 months, did you worry that your food would run out before you got money to buy more?: No     Within the past 12 months, did the food you bought just not last and you didn t have money to get more?: No   Transportation Needs: Low Risk  (8/28/2024)    Transportation Needs     Within the past 12 months, has lack of transportation kept you from medical appointments, getting your medicines, non-medical meetings or appointments, work, or from getting things that you need?: No   Physical Activity: Sufficiently Active (8/28/2024)    Exercise Vital Sign     Days of Exercise per Week: 5 days     Minutes of Exercise per Session: 40 min   Stress: No Stress Concern Present (8/28/2024)    [employer] - Occupational Stress Questionnaire     Feeling of Stress : Not at all   Social Connections: Unknown (8/28/2024)    Social Connection and Isolation Panel [NHANES]     Frequency of Communication with Friends and Family: Not on file     Frequency of Social Gatherings with Friends and Family: Twice a week     Attends Religion Services: Not on file     Active Member of Clubs or Organizations: Not on file     Attends Club or Organization Meetings: Not on file     Marital Status: Not on file   Interpersonal Safety: Low Risk  (8/28/2024)    Interpersonal Safety     Do you feel physically and emotionally safe where you currently live?: Yes     Within the past 12 months, have you been hit, slapped, kicked or otherwise physically  hurt by someone?: No     Within the past 12 months, have you been humiliated or emotionally abused in other ways by your partner or ex-partner?: No   Housing Stability: Low Risk  (8/28/2024)    Housing Stability     Do you have housing? : Yes     Are you worried about losing your housing?: No       Active Ambulatory Problems     Diagnosis Date Noted    Restless legs syndrome 09/03/2020    Idiopathic progressive polyneuropathy 09/03/2020    Hypertension 09/03/2020    Hyperlipidemia 03/23/2016    Generalized anxiety disorder 09/03/2020    Essential tremor 09/03/2020    Bilateral carpal tunnel syndrome 09/03/2020    Attention deficit disorder (ADD) without hyperactivity 09/06/2019    Anxiety 03/23/2016    Chronic pain disorder 10/22/2019    Chewing tobacco use 03/23/2016    Displacement of intervertebral disc, site unspecified, without myelopathy 09/04/2020    Diverticulosis of intestine without bleeding 10/06/2015    Esophageal reflux 09/04/2020    Gout 09/04/2020    Internal hemorrhoids 10/06/2015    Morbid obesity (H) 10/30/2018    Neoplasm of testis 09/04/2020    Regional enteritis (H) 09/04/2020    Nonspecific elevation of levels of transaminase or lactic acid dehydrogenase (LDH) 07/29/2021    Maisha-rectal abscess 04/14/2015     Resolved Ambulatory Problems     Diagnosis Date Noted    Bright Red Blood Per Rectum      Past Medical History:   Diagnosis Date    Arthritis     Crohn disease (H)     Diverticulosis     Herniated disc     HTN (hypertension)     Hypokalemia     Motion sickness     Pancreatic cancer (H)     Pancreatic cancer (H)        Family History   Problem Relation Age of Onset    Hypertension Mother     Thyroid Disease Mother     Diabetes Father     Hypertension Father     Cancer Maternal Grandmother     Cerebrovascular Disease Maternal Grandmother     Cerebrovascular Disease Paternal Grandmother     Heart Disease Paternal Grandfather     Varicose Veins Mother     Coronary Artery Disease Father      Throat cancer Maternal Grandmother         Laryngeal cancer       Objective:     /82   Pulse 92   Temp 97.6  F (36.4  C)   Resp 17   Ht 1.829 m (6')   Wt 112 kg (247 lb)   SpO2 92%   BMI 33.50 kg/m      Patient is alert, in no obvious distress.   Skin: Warm, dry.  No lesions or rashes.  Skin turgor rapid return.   HEENT:  Head normocephalic, atraumatic.  Eyes normal.  PERRL.  EOM's intact.  No nystagmus. Ears normal.  Nose patent, mucosa pink.  Oropharynx mucosa pink.  No lesions or tonsillar enlargement.   Neck: Supple, no lymphadenopathy, JVD, bruits noted.  No thyromegaly.  Lungs:  Clear to auscultation. Respirations even and unlabored.  No wheezing or rales noted.   Heart:  Regular rate and rhythm.  No murmurs, S3, S4, gallops, or rubs.    Abdomen: Soft, nontender.  No organomegaly. Bowel sounds normoactive. No guarding or masses noted.   :  deferred  Musculoskeletal:  Full ROM of extremities.  DTRs symmetrical, sensations intact.  No obvious deformity.  Muscle strength equal +5/5.   Neurological:  Cranial nerves 2-12 intact.

## 2024-08-29 LAB
ALBUMIN SERPL BCG-MCNC: 4.7 G/DL (ref 3.5–5.2)
ALP SERPL-CCNC: 62 U/L (ref 40–150)
ALT SERPL W P-5'-P-CCNC: 29 U/L (ref 0–70)
AST SERPL W P-5'-P-CCNC: 38 U/L (ref 0–45)
BILIRUB DIRECT SERPL-MCNC: <0.2 MG/DL (ref 0–0.3)
BILIRUB SERPL-MCNC: 0.6 MG/DL
LIPASE SERPL-CCNC: 24 U/L (ref 13–60)
PROT SERPL-MCNC: 7.6 G/DL (ref 6.4–8.3)
PSA SERPL DL<=0.01 NG/ML-MCNC: 2.8 NG/ML (ref 0–3.5)
TSH SERPL DL<=0.005 MIU/L-ACNC: 0.87 UIU/ML (ref 0.3–4.2)

## 2024-09-16 ENCOUNTER — MYC REFILL (OUTPATIENT)
Dept: FAMILY MEDICINE | Facility: CLINIC | Age: 54
End: 2024-09-16
Payer: COMMERCIAL

## 2024-09-16 DIAGNOSIS — F98.8 ATTENTION DEFICIT DISORDER (ADD) WITHOUT HYPERACTIVITY: ICD-10-CM

## 2024-09-16 RX ORDER — DEXTROAMPHETAMINE SACCHARATE, AMPHETAMINE ASPARTATE, DEXTROAMPHETAMINE SULFATE AND AMPHETAMINE SULFATE 5; 5; 5; 5 MG/1; MG/1; MG/1; MG/1
20 TABLET ORAL 3 TIMES DAILY
Qty: 90 TABLET | Refills: 0 | Status: SHIPPED | OUTPATIENT
Start: 2024-09-16

## 2024-10-14 ENCOUNTER — MYC REFILL (OUTPATIENT)
Dept: FAMILY MEDICINE | Facility: CLINIC | Age: 54
End: 2024-10-14
Payer: COMMERCIAL

## 2024-10-14 DIAGNOSIS — F98.8 ATTENTION DEFICIT DISORDER (ADD) WITHOUT HYPERACTIVITY: ICD-10-CM

## 2024-10-14 RX ORDER — DEXTROAMPHETAMINE SACCHARATE, AMPHETAMINE ASPARTATE, DEXTROAMPHETAMINE SULFATE AND AMPHETAMINE SULFATE 5; 5; 5; 5 MG/1; MG/1; MG/1; MG/1
20 TABLET ORAL 3 TIMES DAILY
Qty: 90 TABLET | Refills: 0 | Status: SHIPPED | OUTPATIENT
Start: 2024-10-14 | End: 2024-11-11

## 2024-11-11 ENCOUNTER — MYC REFILL (OUTPATIENT)
Dept: FAMILY MEDICINE | Facility: CLINIC | Age: 54
End: 2024-11-11
Payer: COMMERCIAL

## 2024-11-11 DIAGNOSIS — F98.8 ATTENTION DEFICIT DISORDER (ADD) WITHOUT HYPERACTIVITY: ICD-10-CM

## 2024-11-13 RX ORDER — DEXTROAMPHETAMINE SACCHARATE, AMPHETAMINE ASPARTATE, DEXTROAMPHETAMINE SULFATE AND AMPHETAMINE SULFATE 5; 5; 5; 5 MG/1; MG/1; MG/1; MG/1
20 TABLET ORAL 3 TIMES DAILY
Qty: 90 TABLET | Refills: 0 | Status: SHIPPED | OUTPATIENT
Start: 2024-11-13

## 2024-12-30 ENCOUNTER — MYC REFILL (OUTPATIENT)
Dept: FAMILY MEDICINE | Facility: CLINIC | Age: 54
End: 2024-12-30
Payer: COMMERCIAL

## 2024-12-30 DIAGNOSIS — F98.8 ATTENTION DEFICIT DISORDER (ADD) WITHOUT HYPERACTIVITY: ICD-10-CM

## 2024-12-31 RX ORDER — DEXTROAMPHETAMINE SACCHARATE, AMPHETAMINE ASPARTATE, DEXTROAMPHETAMINE SULFATE AND AMPHETAMINE SULFATE 5; 5; 5; 5 MG/1; MG/1; MG/1; MG/1
20 TABLET ORAL 3 TIMES DAILY
Qty: 90 TABLET | Refills: 0 | Status: SHIPPED | OUTPATIENT
Start: 2024-12-31

## 2025-01-29 ENCOUNTER — MYC REFILL (OUTPATIENT)
Dept: FAMILY MEDICINE | Facility: CLINIC | Age: 55
End: 2025-01-29
Payer: COMMERCIAL

## 2025-01-29 DIAGNOSIS — F98.8 ATTENTION DEFICIT DISORDER (ADD) WITHOUT HYPERACTIVITY: ICD-10-CM

## 2025-01-29 RX ORDER — DEXTROAMPHETAMINE SACCHARATE, AMPHETAMINE ASPARTATE, DEXTROAMPHETAMINE SULFATE AND AMPHETAMINE SULFATE 5; 5; 5; 5 MG/1; MG/1; MG/1; MG/1
20 TABLET ORAL 3 TIMES DAILY
Qty: 90 TABLET | Refills: 0 | Status: SHIPPED | OUTPATIENT
Start: 2025-01-29

## 2025-02-20 ENCOUNTER — VIRTUAL VISIT (OUTPATIENT)
Dept: FAMILY MEDICINE | Facility: CLINIC | Age: 55
End: 2025-02-20
Payer: COMMERCIAL

## 2025-02-20 DIAGNOSIS — F98.8 ATTENTION DEFICIT DISORDER (ADD) WITHOUT HYPERACTIVITY: Primary | ICD-10-CM

## 2025-02-20 DIAGNOSIS — I10 PRIMARY HYPERTENSION: ICD-10-CM

## 2025-02-20 DIAGNOSIS — F41.9 ANXIETY: ICD-10-CM

## 2025-02-20 RX ORDER — HYDROCHLOROTHIAZIDE 25 MG/1
25 TABLET ORAL DAILY
Qty: 90 TABLET | Refills: 0 | Status: SHIPPED | OUTPATIENT
Start: 2025-02-20

## 2025-02-20 RX ORDER — LOSARTAN POTASSIUM 50 MG/1
50 TABLET ORAL DAILY
Qty: 90 TABLET | Refills: 0 | Status: SHIPPED | OUTPATIENT
Start: 2025-02-20

## 2025-02-20 NOTE — PROGRESS NOTES
Rxe is a 54 year old who is being evaluated via a billable video visit.          Attention deficit disorder (ADD) without hyperactivity  This is controlled.  He continues Adderall 20 mg 3 times daily.  I reviewed the PDMP and there are no concerns today.  Controlled substance agreement and urine drug screen are up-to-date.    Primary hypertension  Patient will send blood pressure readings.  He continues losartan and hydrochlorothiazide.    Anxiety  This is controlled with sertraline.    The longitudinal plan of care for the diagnosis(es)/condition(s) as documented were addressed during this visit. Due to the added complexity in care, I will continue to support Rex in the subsequent management and with ongoing continuity of care.      Subjective   Rex is a 54 year old, presenting for the following health issues:  Medication Follow-up      2/20/2025     3:08 PM   Additional Questions   Roomed by Sita     History of Present Illness       Reason for visit:  Meds   He is taking medications regularly.       Patient presents today for medication management.  Patient is taking Adderall 20 mg 3 times daily for ADD.  Patient is tolerating the medication well without any side effects.  He feels as though the medication helps him relax and focus.  He completes tasks in a timely manner.  He does take the medication daily.  He has hypertension and is taking losartan 50 mg daily and hydrochlorothiazide 25 mg daily.  He does not have an updated blood pressure present.  Patient will send blood pressure readings over the next few days.  He is taking sertraline 50 mg daily for anxiety.  Mood is stable.  He denies thoughts of suicide.  He feels well supported.    Review of Systems  Constitutional, HEENT, cardiovascular, pulmonary, GI, , musculoskeletal, neuro, skin, endocrine and psych systems are negative, except as otherwise noted.      Objective           Vitals:  No vitals were obtained today due to virtual  visit.    Physical Exam   GENERAL: alert and no distress  EYES: Eyes grossly normal to inspection.  No discharge or erythema, or obvious scleral/conjunctival abnormalities.  RESP: No audible wheeze, cough, or visible cyanosis.    SKIN: Visible skin clear. No significant rash, abnormal pigmentation or lesions.  NEURO: Cranial nerves grossly intact.  Mentation and speech appropriate for age.  PSYCH: Appropriate affect, tone, and pace of words        Video-Visit Details    Type of service:  Video Visit   Originating Location (pt. Location): Home    Distant Location (provider location):  On-site  Platform used for Video Visit: Ace  Signed Electronically by: THANIA Prater CNP

## 2025-02-25 ENCOUNTER — MYC REFILL (OUTPATIENT)
Dept: FAMILY MEDICINE | Facility: CLINIC | Age: 55
End: 2025-02-25
Payer: COMMERCIAL

## 2025-02-25 DIAGNOSIS — F98.8 ATTENTION DEFICIT DISORDER (ADD) WITHOUT HYPERACTIVITY: ICD-10-CM

## 2025-02-25 RX ORDER — DEXTROAMPHETAMINE SACCHARATE, AMPHETAMINE ASPARTATE, DEXTROAMPHETAMINE SULFATE AND AMPHETAMINE SULFATE 5; 5; 5; 5 MG/1; MG/1; MG/1; MG/1
20 TABLET ORAL 3 TIMES DAILY
Qty: 90 TABLET | Refills: 0 | Status: SHIPPED | OUTPATIENT
Start: 2025-02-25

## 2025-03-26 ENCOUNTER — MYC REFILL (OUTPATIENT)
Dept: FAMILY MEDICINE | Facility: CLINIC | Age: 55
End: 2025-03-26
Payer: COMMERCIAL

## 2025-03-26 DIAGNOSIS — F98.8 ATTENTION DEFICIT DISORDER (ADD) WITHOUT HYPERACTIVITY: ICD-10-CM

## 2025-03-26 RX ORDER — DEXTROAMPHETAMINE SACCHARATE, AMPHETAMINE ASPARTATE, DEXTROAMPHETAMINE SULFATE AND AMPHETAMINE SULFATE 5; 5; 5; 5 MG/1; MG/1; MG/1; MG/1
20 TABLET ORAL 3 TIMES DAILY
Qty: 90 TABLET | Refills: 0 | Status: SHIPPED | OUTPATIENT
Start: 2025-03-26

## 2025-04-21 ENCOUNTER — MYC REFILL (OUTPATIENT)
Dept: FAMILY MEDICINE | Facility: CLINIC | Age: 55
End: 2025-04-21
Payer: COMMERCIAL

## 2025-04-21 DIAGNOSIS — F98.8 ATTENTION DEFICIT DISORDER (ADD) WITHOUT HYPERACTIVITY: ICD-10-CM

## 2025-04-21 RX ORDER — DEXTROAMPHETAMINE SACCHARATE, AMPHETAMINE ASPARTATE, DEXTROAMPHETAMINE SULFATE AND AMPHETAMINE SULFATE 5; 5; 5; 5 MG/1; MG/1; MG/1; MG/1
20 TABLET ORAL 3 TIMES DAILY
Qty: 90 TABLET | Refills: 0 | Status: SHIPPED | OUTPATIENT
Start: 2025-04-21

## 2025-05-16 ENCOUNTER — HOSPITAL ENCOUNTER (EMERGENCY)
Facility: CLINIC | Age: 55
Discharge: HOME | End: 2025-05-16
Attending: EMERGENCY MEDICINE | Admitting: EMERGENCY MEDICINE
Payer: COMMERCIAL

## 2025-05-16 ENCOUNTER — APPOINTMENT (OUTPATIENT)
Dept: CT IMAGING | Facility: CLINIC | Age: 55
End: 2025-05-16
Attending: EMERGENCY MEDICINE
Payer: COMMERCIAL

## 2025-05-16 VITALS
BODY MASS INDEX: 32.51 KG/M2 | WEIGHT: 240 LBS | OXYGEN SATURATION: 97 % | HEART RATE: 71 BPM | SYSTOLIC BLOOD PRESSURE: 99 MMHG | RESPIRATION RATE: 18 BRPM | DIASTOLIC BLOOD PRESSURE: 79 MMHG | TEMPERATURE: 98 F | HEIGHT: 72 IN

## 2025-05-16 DIAGNOSIS — K92.1 BLOOD IN STOOL: ICD-10-CM

## 2025-05-16 LAB
ABO + RH BLD: NORMAL
ALBUMIN SERPL BCG-MCNC: 4.3 G/DL (ref 3.5–5.2)
ALP SERPL-CCNC: 73 U/L (ref 40–150)
ALT SERPL W P-5'-P-CCNC: 23 U/L (ref 0–70)
ANION GAP SERPL CALCULATED.3IONS-SCNC: 12 MMOL/L (ref 7–15)
APTT PPP: 30 SECONDS (ref 22–38)
AST SERPL W P-5'-P-CCNC: 30 U/L (ref 0–45)
BASOPHILS # BLD AUTO: 0 10E3/UL (ref 0–0.2)
BASOPHILS NFR BLD AUTO: 0 %
BILIRUB SERPL-MCNC: 0.4 MG/DL
BILIRUBIN DIRECT (ROCHE PRO & PURE): 0.16 MG/DL (ref 0–0.45)
BLD GP AB SCN SERPL QL: NEGATIVE
BUN SERPL-MCNC: 22.1 MG/DL (ref 6–20)
CALCIUM SERPL-MCNC: 9.2 MG/DL (ref 8.8–10.4)
CHLORIDE SERPL-SCNC: 103 MMOL/L (ref 98–107)
CREAT SERPL-MCNC: 1.01 MG/DL (ref 0.67–1.17)
EGFRCR SERPLBLD CKD-EPI 2021: 87 ML/MIN/1.73M2
EOSINOPHIL # BLD AUTO: 0.1 10E3/UL (ref 0–0.7)
EOSINOPHIL NFR BLD AUTO: 1 %
ERYTHROCYTE [DISTWIDTH] IN BLOOD BY AUTOMATED COUNT: 13 % (ref 10–15)
GLUCOSE SERPL-MCNC: 103 MG/DL (ref 70–99)
HCO3 SERPL-SCNC: 26 MMOL/L (ref 22–29)
HCT VFR BLD AUTO: 44 % (ref 40–53)
HGB BLD-MCNC: 15.3 G/DL (ref 13.3–17.7)
IMM GRANULOCYTES # BLD: 0 10E3/UL
IMM GRANULOCYTES NFR BLD: 0 %
INR PPP: 0.97 (ref 0.85–1.15)
LIPASE SERPL-CCNC: 31 U/L (ref 13–60)
LYMPHOCYTES # BLD AUTO: 2.2 10E3/UL (ref 0.8–5.3)
LYMPHOCYTES NFR BLD AUTO: 23 %
MCH RBC QN AUTO: 30.1 PG (ref 26.5–33)
MCHC RBC AUTO-ENTMCNC: 34.8 G/DL (ref 31.5–36.5)
MCV RBC AUTO: 87 FL (ref 78–100)
MONOCYTES # BLD AUTO: 0.9 10E3/UL (ref 0–1.3)
MONOCYTES NFR BLD AUTO: 9 %
NEUTROPHILS # BLD AUTO: 6.4 10E3/UL (ref 1.6–8.3)
NEUTROPHILS NFR BLD AUTO: 67 %
NRBC # BLD AUTO: 0 10E3/UL
NRBC BLD AUTO-RTO: 0 /100
PLATELET # BLD AUTO: 210 10E3/UL (ref 150–450)
POTASSIUM SERPL-SCNC: 3.8 MMOL/L (ref 3.4–5.3)
PROT SERPL-MCNC: 6.9 G/DL (ref 6.4–8.3)
PROTHROMBIN TIME: 13.1 SECONDS (ref 11.8–14.8)
RBC # BLD AUTO: 5.08 10E6/UL (ref 4.4–5.9)
SODIUM SERPL-SCNC: 141 MMOL/L (ref 135–145)
SPECIMEN EXP DATE BLD: NORMAL
WBC # BLD AUTO: 9.6 10E3/UL (ref 4–11)

## 2025-05-16 PROCEDURE — 85025 COMPLETE CBC W/AUTO DIFF WBC: CPT | Performed by: EMERGENCY MEDICINE

## 2025-05-16 PROCEDURE — 99285 EMERGENCY DEPT VISIT HI MDM: CPT | Mod: 25

## 2025-05-16 PROCEDURE — 83690 ASSAY OF LIPASE: CPT | Performed by: EMERGENCY MEDICINE

## 2025-05-16 PROCEDURE — 86900 BLOOD TYPING SEROLOGIC ABO: CPT | Performed by: EMERGENCY MEDICINE

## 2025-05-16 PROCEDURE — 96360 HYDRATION IV INFUSION INIT: CPT | Mod: 59

## 2025-05-16 PROCEDURE — 80048 BASIC METABOLIC PNL TOTAL CA: CPT | Performed by: EMERGENCY MEDICINE

## 2025-05-16 PROCEDURE — 74174 CTA ABD&PLVS W/CONTRAST: CPT

## 2025-05-16 PROCEDURE — 258N000003 HC RX IP 258 OP 636: Performed by: EMERGENCY MEDICINE

## 2025-05-16 PROCEDURE — 96361 HYDRATE IV INFUSION ADD-ON: CPT

## 2025-05-16 PROCEDURE — 85610 PROTHROMBIN TIME: CPT | Performed by: EMERGENCY MEDICINE

## 2025-05-16 PROCEDURE — 82248 BILIRUBIN DIRECT: CPT | Performed by: EMERGENCY MEDICINE

## 2025-05-16 PROCEDURE — 250N000011 HC RX IP 250 OP 636: Performed by: EMERGENCY MEDICINE

## 2025-05-16 PROCEDURE — 85730 THROMBOPLASTIN TIME PARTIAL: CPT | Performed by: EMERGENCY MEDICINE

## 2025-05-16 PROCEDURE — 36415 COLL VENOUS BLD VENIPUNCTURE: CPT | Performed by: EMERGENCY MEDICINE

## 2025-05-16 RX ORDER — IOPAMIDOL 755 MG/ML
90 INJECTION, SOLUTION INTRAVASCULAR ONCE
Status: COMPLETED | OUTPATIENT
Start: 2025-05-16 | End: 2025-05-16

## 2025-05-16 RX ADMIN — IOPAMIDOL 90 ML: 755 INJECTION, SOLUTION INTRAVENOUS at 22:05

## 2025-05-16 RX ADMIN — SODIUM CHLORIDE 500 ML: 0.9 INJECTION, SOLUTION INTRAVENOUS at 21:42

## 2025-05-16 ASSESSMENT — COLUMBIA-SUICIDE SEVERITY RATING SCALE - C-SSRS
6. HAVE YOU EVER DONE ANYTHING, STARTED TO DO ANYTHING, OR PREPARED TO DO ANYTHING TO END YOUR LIFE?: NO
1. IN THE PAST MONTH, HAVE YOU WISHED YOU WERE DEAD OR WISHED YOU COULD GO TO SLEEP AND NOT WAKE UP?: NO
2. HAVE YOU ACTUALLY HAD ANY THOUGHTS OF KILLING YOURSELF IN THE PAST MONTH?: NO

## 2025-05-16 ASSESSMENT — ACTIVITIES OF DAILY LIVING (ADL)
ADLS_ACUITY_SCORE: 41

## 2025-05-17 NOTE — ED PROVIDER NOTES
EMERGENCY DEPARTMENT ENCOUNTER      NAME: Rex Bond  AGE: 55 year old male  YOB: 1970  MRN: [id number]  EVALUATION DATE & TIME: 5/16/2025  7:45 PM    PCP: Anisa Bush    ED PROVIDER: Sita Rosenthal DO      Chief Complaint   Patient presents with    Rectal Bleeding    Abdominal Pain         FINAL IMPRESSION:  1. Blood in stool          ED COURSE & MEDICAL DECISION MAKING:    Pertinent Labs & Imaging studies reviewed. (See chart for details)  8:51 PM I met the patient and performed my initial interview and exam.  11:20 PM Patient updated on results.    55 year old male presents to the Emergency Department for evaluation of blood in stool.  Patient reports noted an episode 6 days ago and 2 episodes a day.  Blood appeared darker.  He has had some mild left upper quadrant abdominal pain.  No fevers or chills.  No vomiting.  No dizziness or lightheadedness.  Patient notes some mixed stool in the toilet.  He is nontoxic-appearing.  Vitals are unremarkable.  No testicular or penile pain.  CTA imaging obtained without evidence of active GI bleed.  Noted diverticulosis without diverticulitis.  Labs overall reassuring, hemoglobin reassuring at 15.3.  No coagulopathy.  Discussed results with patient and wife.  At this time I think it is reasonable for him to be discharged and follow closely with GI.  Will have him start a PPI given this left upper quadrant pain could be some gastritis.  History of internal hemorrhoids as well, we discussed symptomatic care for home.  Did discuss return if any acute worsening symptoms, signs and symptoms of anemia or any other concerns.      At the conclusion of the encounter I discussed the results of all of the tests and the disposition. The questions were answered. The patient or family acknowledged understanding and was agreeable with the care plan.     Medical Decision Making  I obtained history from Family Member/Significant Other  Discharge. I prescribed additional  prescription strength medication(s) as charted. I considered admission, but ultimately discharged patient after reassuring work up and exam.    MIPS (CTPE, Dental pain, Moore, Sinusitis, Asthma/COPD, Head Trauma): Not Applicable    SEPSIS: None      MEDICATIONS GIVEN IN THE EMERGENCY:  Medications   sodium chloride 0.9% BOLUS 500 mL (0 mLs Intravenous Stopped 5/16/25 8280)   iopamidol (ISOVUE-370) solution 90 mL (90 mLs Intravenous $Given 5/16/25 2205)       NEW PRESCRIPTIONS STARTED AT TODAY'S ER VISIT  Discharge Medication List as of 5/16/2025 11:30 PM             =================================================================    HPI    Patient information was obtained from: Patient    Use of : N/A         Rex Bond is a 55 year old male with a pertinent history of HTN, pancreatic cancer s/p whipple, crohn's disease not on any medications, anxiety, internal hemorrhoids who presents to this ED by private vehicle for evaluation of blood in stool.  Patient reports [place] darker blood in the stool that filled the bowl.  Appeared to have a clot in it.  No further symptoms of bleeding until today.  2 episodes of blood that filled the bowl.  Also mixed in stool.  Has been having a gnawing pain to his left side.  Nausea without vomiting.  No fever.  Noted some blood in his urine a couple weeks ago that resolved.  No NSAID or blood thinner use.  Reports heartburn from time to time but does not take anything for this.  No rectal pain.      REVIEW OF SYSTEMS   Per HPI    PAST MEDICAL HISTORY:  Past Medical History:   Diagnosis Date    Anxiety     Arthritis     Crohn disease (H)     Diverticulosis     Esophageal reflux     Gout     Herniated disc     HTN (hypertension)     Hyperlipidemia     Hypokalemia     Motion sickness     Pancreatic cancer (H)     Pancreatic cancer (H)     mass removed at [facility] 4/2012       PAST SURGICAL HISTORY:  Past Surgical History:    Procedure Laterality Date    APPENDECTOMY      APPENDECTOMY  2003    Description: Appendectomy;  Recorded: 02/10/2009;  Comments: 11-0-03    BACK SURGERY  2014    L3,4,5 surgically repaired    HAND SURGERY      IR LUMBAR EPIDURAL STEROID INJECTION  4/23/2012    IR LUMBAR EPIDURAL STEROID INJECTION  5/17/2012    KNEE SURGERY Right     LAMINECTOMY LUMBAR TWO LEVELS  2014    L3,4,5 surgically repaired    OTHER SURGICAL HISTORY      excision of abdominal wall tumorfound out was pancreatic cancer    OTHER SURGICAL HISTORY      OTHER SURGICAL HISTORYtumor removed from pancreas outer lining    RELEASE CARPAL TUNNEL BILATERAL      SPINE SURGERY      VASECTOMY      VASECTOMY  2000           CURRENT MEDICATIONS:    amphetamine-dextroamphetamine (ADDERALL) 20 MG tablet  hydrochlorothiazide (HYDRODIURIL) 25 MG tablet  losartan (COZAAR) 50 MG tablet  sertraline (ZOLOFT) 50 MG tablet         ALLERGIES:  No Known Allergies    FAMILY HISTORY:  Family History   Problem Relation Age of Onset    Hypertension Mother     Thyroid Disease Mother     Diabetes Father     Hypertension Father     Cancer Maternal Grandmother     Cerebrovascular Disease Maternal Grandmother     Cerebrovascular Disease Paternal Grandmother     Heart Disease Paternal Grandfather     Varicose Veins Mother     Coronary Artery Disease Father     Throat cancer Maternal Grandmother         Laryngeal cancer       SOCIAL HISTORY:   Social History     Socioeconomic History    Marital status:    Tobacco Use    Smoking status: Some Days     Types: Dip, chew, snus or snuff     Passive exposure: Past    Smokeless tobacco: Current     Types: Chew   Vaping Use    Vaping status: Never Used   Substance and Sexual Activity    Alcohol use: Yes     Social Drivers of Health     Financial Resource Strain: Low Risk  (8/28/2024)    Financial Resource Strain     Within the past 12 months, have you or your family members you live with been unable to get utilities (heat,  electricity) when it was really needed?: No   Food Insecurity: Low Risk  (8/28/2024)    Food Insecurity     Within the past 12 months, did you worry that your food would run out before you got money to buy more?: No     Within the past 12 months, did the food you bought just not last and you didn t have money to get more?: No   Transportation Needs: Low Risk  (8/28/2024)    Transportation Needs     Within the past 12 months, has lack of transportation kept you from medical appointments, getting your medicines, non-medical meetings or appointments, work, or from getting things that you need?: No   Physical Activity: Sufficiently Active (8/28/2024)    Exercise Vital Sign     Days of Exercise per Week: 5 days     Minutes of Exercise per Session: 40 min   Stress: No Stress Concern Present (8/28/2024)    [employer] - Occupational Stress Questionnaire     Feeling of Stress : Not at all   Social Connections: Unknown (8/28/2024)    Social Connection and Isolation Panel [NHANES]     Frequency of Social Gatherings with Friends and Family: Twice a week   Interpersonal Safety: Low Risk  (8/28/2024)    Interpersonal Safety     Do you feel physically and emotionally safe where you currently live?: Yes     Within the past 12 months, have you been hit, slapped, kicked or otherwise physically hurt by someone?: No     Within the past 12 months, have you been humiliated or emotionally abused in other ways by your partner or ex-partner?: No   Housing Stability: Low Risk  (8/28/2024)    Housing Stability     Do you have housing? : Yes     Are you worried about losing your housing?: No       VITALS:  BP 99/79   Pulse 71   Temp 98  F (36.7  C) (Oral)   Resp 18   Ht 1.829 m (6')   Wt 108.9 kg (240 lb)   SpO2 97%   BMI 32.55 kg/m      PHYSICAL EXAM    Physical Exam  Constitutional:       General: He is not in acute distress.  HENT:      Head: Normocephalic and atraumatic.      Mouth/Throat:      Pharynx:  Oropharynx is clear.   Eyes:      Pupils: Pupils are equal, round, and reactive to light.   Cardiovascular:      Rate and Rhythm: Normal rate and regular rhythm.      Pulses: Normal pulses.      Heart sounds: Normal heart sounds.   Pulmonary:      Effort: Pulmonary effort is normal.      Breath sounds: Normal breath sounds.   Abdominal:      General: Abdomen is flat. Bowel sounds are normal.      Palpations: Abdomen is soft.      Tenderness: There is abdominal tenderness in the left upper quadrant.   Musculoskeletal:         General: Normal range of motion.   Skin:     General: Skin is warm and dry.      Capillary Refill: Capillary refill takes less than 2 seconds.   Neurological:      General: No focal deficit present.      Mental Status: He is alert and oriented to person, place, and time.             LAB:  All pertinent labs reviewed and interpreted.  Labs Ordered and Resulted from Time of ED Arrival to Time of ED Departure   BASIC METABOLIC PANEL - Abnormal       Result Value    Sodium 141      Potassium 3.8      Chloride 103      Carbon Dioxide (CO2) 26      Anion Gap 12      Urea Nitrogen 22.1 (*)     Creatinine 1.01      GFR Estimate 87      Calcium 9.2      Glucose 103 (*)    INR - Normal    INR 0.97      PT 13.1     PARTIAL THROMBOPLASTIN TIME - Normal    aPTT 30     HEPATIC FUNCTION PANEL - Normal    Protein Total 6.9      Albumin 4.3      Bilirubin Total 0.4      Alkaline Phosphatase 73      AST 30      ALT 23      Bilirubin Direct 0.16     LIPASE - Normal    Lipase 31     CBC WITH PLATELETS AND DIFFERENTIAL    WBC Count 9.6      RBC Count 5.08      Hemoglobin 15.3      Hematocrit 44.0      MCV 87      MCH 30.1      MCHC 34.8      RDW 13.0      Platelet Count 210      % Neutrophils 67      % Lymphocytes 23      % Monocytes 9      % Eosinophils 1      % Basophils 0      % Immature Granulocytes 0      NRBCs per 100 WBC 0      Absolute Neutrophils 6.4      Absolute Lymphocytes 2.2      Absolute Monocytes 0.9       Absolute Eosinophils 0.1      Absolute Basophils 0.0      Absolute Immature Granulocytes 0.0      Absolute NRBCs 0.0     TYPE AND SCREEN, ADULT    ABO/RH(D) O POS      Antibody Screen Negative      SPECIMEN EXPIRATION DATE [id number]     ABO/RH TYPE AND SCREEN       RADIOLOGY:  Reviewed all pertinent imaging. Please see official radiology report.  CTA GI Bleed   Final Result   IMPRESSION:   1.  There is no active GI bleeding.   2.  Colonic diverticula without acute diverticulitis. No bowel obstruction or inflammation.          Sita Rosenthal DO  Emergency Medicine  [facility] EMERGENCY ROOM  [address] [place] [id number] [phone]  Dept: [phone]       Sita Rosenthal DO  05/17/25 0058

## 2025-05-17 NOTE — DISCHARGE INSTRUCTIONS
As discussed your testing was reassuring here.  No signs of any active GI bleeding on your scan, normal hemoglobin  Certainly with your bleeding I think you should follow-up closely with GI  Avoid any straining as your bleeding could be from internal hemorrhoids, you could use Preparation H suppositories  I would start a daily antacid such as Prilosec over-the-counter (take daily), avoid any spicy or acidic food, alcohol or caffeine or tobacco that could make your symptoms worse  Return to the emergency room if sudden increase in your blood in the stool, dizziness, lightheadedness or other signs of low hemoglobin

## 2025-05-17 NOTE — ED TRIAGE NOTES
"Reports intermittent abd pain with nausea and rectal bleeding with bowel movements x 6 days  LUQ abd pain 4/10  Denies any urinary symptoms but does report having some \"pink tinged urine\" approx 2 weeks ago  Denies any blood thinners   Triage Assessment (Adult)       Row Name 05/16/25 1940          Triage Assessment    Airway WDL WDL        Respiratory WDL    Respiratory WDL WDL        Skin Circulation/Temperature WDL    Skin Circulation/Temperature WDL WDL        Cardiac WDL    Cardiac WDL X;all     Pulse Rate & Regularity tachycardic        Peripheral/Neurovascular WDL    Peripheral Neurovascular WDL WDL        Cognitive/Neuro/Behavioral WDL    Cognitive/Neuro/Behavioral WDL WDL                     "

## 2025-05-19 ENCOUNTER — PATIENT OUTREACH (OUTPATIENT)
Dept: FAMILY MEDICINE | Facility: CLINIC | Age: 55
End: 2025-05-19
Payer: COMMERCIAL

## 2025-05-19 ENCOUNTER — MYC REFILL (OUTPATIENT)
Dept: FAMILY MEDICINE | Facility: CLINIC | Age: 55
End: 2025-05-19
Payer: COMMERCIAL

## 2025-05-19 DIAGNOSIS — F98.8 ATTENTION DEFICIT DISORDER (ADD) WITHOUT HYPERACTIVITY: ICD-10-CM

## 2025-05-19 RX ORDER — DEXTROAMPHETAMINE SACCHARATE, AMPHETAMINE ASPARTATE, DEXTROAMPHETAMINE SULFATE AND AMPHETAMINE SULFATE 5; 5; 5; 5 MG/1; MG/1; MG/1; MG/1
20 TABLET ORAL 3 TIMES DAILY
Qty: 90 TABLET | Refills: 0 | Status: SHIPPED | OUTPATIENT
Start: 2025-05-19

## 2025-05-19 NOTE — TELEPHONE ENCOUNTER
Transitions of Care Outreach  Chief Complaint   Patient presents with    Hospital F/U       Most Recent Admission Date: 5/16/2025   Most Recent Admission Diagnosis:      Most Recent Discharge Date: 5/16/2025   Most Recent Discharge Diagnosis: Blood in stool - K92.1     Transitions of Care Assessment    Discharge Assessment  How are you doing now that you are home?: sx are improving  How are your symptoms? (Red Flag symptoms escalate to triage hotline per guidelines): Improved  Do you know how to contact your clinic care team if you have future questions or changes to your health status? : Yes  Does the patient have their discharge instructions? : Yes  Does the patient have questions regarding their discharge instructions? : No  Were you started on any new medications or were there changes to any of your previous medications? : No  Does the patient have all of their medications?: Yes  Do you have questions regarding any of your medications? : No    Follow up Plan     Discharge Follow-Up  Discharge follow up appointment scheduled in alignment with recommended follow up timeframe or Transitions of Risk Category? (Low = within 30 days; Moderate= within 14 days; High= within 7 days): Yes  Discharge Follow Up Appointment Scheduled with?: Specialty Care Provider    No future appointments.    Outpatient Plan as outlined on AVS reviewed with patient.    For any urgent concerns, please contact our 24 hour nurse triage line: [phone] ([phone])       Nina Mccain RN

## 2025-05-26 NOTE — TELEPHONE ENCOUNTER
Refill Approved    Rx renewed per Medication Renewal Policy. Medication was last renewed on 10/7/19.    Mattie Santana, [facility] Triage/Med Refill 10/9/2019     Requested Prescriptions   Pending Prescriptions Disp Refills     DULoxetine (CYMBALTA) 60 MG capsule [Pharmacy Med Name: DULOXETINE DR 60MG CAPSULES] 90 capsule 2     Sig: TAKE 1 CAPSULE(60 MG) BY MOUTH DAILY       Tricyclics/Misc Antidepressant/Antianxiety Meds Refill Protocol Passed - 10/7/2019  9:46 PM        Passed - PCP or prescribing provider visit in last year     Last office visit with prescriber/PCP: 9/5/2019 Anisa Bush CNP OR same dept: 9/5/2019 Anisa Bush CNP OR same specialty: 9/5/2019 Anisa Bush CNP  Last physical: 10/30/2018 Last MTM visit: Visit date not found   Next visit within 3 mo: Visit date not found  Next physical within 3 mo: Visit date not found  Prescriber OR PCP: Anisa Bush CNP  Last diagnosis associated with med order: 1. Irritability  - DULoxetine (CYMBALTA) 60 MG capsule [Pharmacy Med Name: DULOXETINE DR 60MG CAPSULES]; TAKE 1 CAPSULE(60 MG) BY MOUTH DAILY  Dispense: 90 capsule; Refill: 2    2. Chronic bilateral low back pain with bilateral sciatica  - DULoxetine (CYMBALTA) 60 MG capsule [Pharmacy Med Name: DULOXETINE DR 60MG CAPSULES]; TAKE 1 CAPSULE(60 MG) BY MOUTH DAILY  Dispense: 90 capsule; Refill: 2    If protocol passes may refill for 12 months if within 3 months of last provider visit (or a total of 15 months).                          Unclear significance  D-dimer elevation can be noted as gestational period progresses  Will defer further imaging to cardiology service

## 2025-06-05 DIAGNOSIS — I10 PRIMARY HYPERTENSION: ICD-10-CM

## 2025-06-05 RX ORDER — LOSARTAN POTASSIUM 50 MG/1
50 TABLET ORAL DAILY
Qty: 90 TABLET | Refills: 2 | Status: SHIPPED | OUTPATIENT
Start: 2025-06-05

## 2025-06-05 RX ORDER — HYDROCHLOROTHIAZIDE 25 MG/1
25 TABLET ORAL DAILY
Qty: 90 TABLET | Refills: 2 | Status: SHIPPED | OUTPATIENT
Start: 2025-06-05

## 2025-06-16 ENCOUNTER — MYC REFILL (OUTPATIENT)
Dept: FAMILY MEDICINE | Facility: CLINIC | Age: 55
End: 2025-06-16
Payer: COMMERCIAL

## 2025-06-16 DIAGNOSIS — F98.8 ATTENTION DEFICIT DISORDER (ADD) WITHOUT HYPERACTIVITY: ICD-10-CM

## 2025-06-16 RX ORDER — DEXTROAMPHETAMINE SACCHARATE, AMPHETAMINE ASPARTATE, DEXTROAMPHETAMINE SULFATE AND AMPHETAMINE SULFATE 5; 5; 5; 5 MG/1; MG/1; MG/1; MG/1
20 TABLET ORAL 3 TIMES DAILY
Qty: 90 TABLET | Refills: 0 | Status: SHIPPED | OUTPATIENT
Start: 2025-06-18

## 2025-07-11 ENCOUNTER — MYC REFILL (OUTPATIENT)
Dept: FAMILY MEDICINE | Facility: CLINIC | Age: 55
End: 2025-07-11
Payer: COMMERCIAL

## 2025-07-11 DIAGNOSIS — F98.8 ATTENTION DEFICIT DISORDER (ADD) WITHOUT HYPERACTIVITY: ICD-10-CM

## 2025-07-14 RX ORDER — DEXTROAMPHETAMINE SACCHARATE, AMPHETAMINE ASPARTATE, DEXTROAMPHETAMINE SULFATE AND AMPHETAMINE SULFATE 5; 5; 5; 5 MG/1; MG/1; MG/1; MG/1
20 TABLET ORAL 3 TIMES DAILY
Qty: 90 TABLET | Refills: 0 | Status: SHIPPED | OUTPATIENT
Start: 2025-07-14

## 2025-07-29 ENCOUNTER — PATIENT OUTREACH (OUTPATIENT)
Dept: CARE COORDINATION | Facility: CLINIC | Age: 55
End: 2025-07-29
Payer: COMMERCIAL

## 2025-08-08 DIAGNOSIS — F98.8 ATTENTION DEFICIT DISORDER (ADD) WITHOUT HYPERACTIVITY: ICD-10-CM

## 2025-08-09 ENCOUNTER — MYC REFILL (OUTPATIENT)
Dept: FAMILY MEDICINE | Facility: CLINIC | Age: 55
End: 2025-08-09
Payer: COMMERCIAL

## 2025-08-09 DIAGNOSIS — F98.8 ATTENTION DEFICIT DISORDER (ADD) WITHOUT HYPERACTIVITY: ICD-10-CM

## 2025-08-10 RX ORDER — DEXTROAMPHETAMINE SACCHARATE, AMPHETAMINE ASPARTATE, DEXTROAMPHETAMINE SULFATE AND AMPHETAMINE SULFATE 5; 5; 5; 5 MG/1; MG/1; MG/1; MG/1
20 TABLET ORAL 3 TIMES DAILY
Qty: 90 TABLET | Refills: 0 | Status: SHIPPED | OUTPATIENT
Start: 2025-08-10

## 2025-08-11 RX ORDER — DEXTROAMPHETAMINE SACCHARATE, AMPHETAMINE ASPARTATE, DEXTROAMPHETAMINE SULFATE AND AMPHETAMINE SULFATE 5; 5; 5; 5 MG/1; MG/1; MG/1; MG/1
20 TABLET ORAL 3 TIMES DAILY
Qty: 90 TABLET | Refills: 0 | OUTPATIENT
Start: 2025-08-11

## (undated) RX ORDER — LIDOCAINE HYDROCHLORIDE 10 MG/ML
INJECTION, SOLUTION EPIDURAL; INFILTRATION; INTRACAUDAL; PERINEURAL
Status: DISPENSED
Start: 2021-02-10